# Patient Record
Sex: MALE | Race: BLACK OR AFRICAN AMERICAN | NOT HISPANIC OR LATINO | Employment: UNEMPLOYED | URBAN - METROPOLITAN AREA
[De-identification: names, ages, dates, MRNs, and addresses within clinical notes are randomized per-mention and may not be internally consistent; named-entity substitution may affect disease eponyms.]

---

## 2021-03-07 ENCOUNTER — ANESTHESIA (OUTPATIENT)
Dept: PERIOP | Facility: HOSPITAL | Age: 61
End: 2021-03-07

## 2021-03-07 ENCOUNTER — APPOINTMENT (OUTPATIENT)
Dept: GENERAL RADIOLOGY | Facility: HOSPITAL | Age: 61
End: 2021-03-07

## 2021-03-07 ENCOUNTER — HOSPITAL ENCOUNTER (INPATIENT)
Facility: HOSPITAL | Age: 61
LOS: 12 days | Discharge: HOME | End: 2021-03-19
Attending: EMERGENCY MEDICINE

## 2021-03-07 ENCOUNTER — APPOINTMENT (OUTPATIENT)
Dept: CT IMAGING | Facility: HOSPITAL | Age: 61
End: 2021-03-07

## 2021-03-07 ENCOUNTER — ANESTHESIA EVENT (OUTPATIENT)
Dept: PERIOP | Facility: HOSPITAL | Age: 61
End: 2021-03-07

## 2021-03-07 DIAGNOSIS — I63.9 CEREBROVASCULAR ACCIDENT (CVA), UNSPECIFIED MECHANISM (HCC): Primary | ICD-10-CM

## 2021-03-07 LAB
ABO GROUP BLD: NORMAL
ALBUMIN SERPL-MCNC: 3.8 G/DL (ref 3.5–5.2)
ALBUMIN/GLOB SERPL: 1.5 G/DL
ALP SERPL-CCNC: 47 U/L (ref 39–117)
ALT SERPL W P-5'-P-CCNC: 9 U/L (ref 1–41)
AMPHET+METHAMPHET UR QL: NEGATIVE
ANION GAP SERPL CALCULATED.3IONS-SCNC: 8.1 MMOL/L (ref 5–15)
APTT PPP: 33.7 SECONDS (ref 22.7–35.4)
ARTERIAL PATENCY WRIST A: ABNORMAL
AST SERPL-CCNC: 12 U/L (ref 1–40)
ATMOSPHERIC PRESS: 759.2 MMHG
BARBITURATES UR QL SCN: NEGATIVE
BASE EXCESS BLDA CALC-SCNC: -3.1 MMOL/L (ref 0–2)
BASOPHILS # BLD AUTO: 0.03 10*3/MM3 (ref 0–0.2)
BASOPHILS NFR BLD AUTO: 0.5 % (ref 0–1.5)
BDY SITE: ABNORMAL
BENZODIAZ UR QL SCN: NEGATIVE
BILIRUB SERPL-MCNC: 0.3 MG/DL (ref 0–1.2)
BLD GP AB SCN SERPL QL: NEGATIVE
BUN SERPL-MCNC: 12 MG/DL (ref 6–20)
BUN/CREAT SERPL: 12.1 (ref 7–25)
CALCIUM SPEC-SCNC: 8.2 MG/DL (ref 8.6–10.5)
CANNABINOIDS SERPL QL: POSITIVE
CHLORIDE SERPL-SCNC: 107 MMOL/L (ref 98–107)
CO2 SERPL-SCNC: 25.9 MMOL/L (ref 22–29)
COCAINE UR QL: NEGATIVE
CREAT SERPL-MCNC: 0.99 MG/DL (ref 0.76–1.27)
DEPRECATED RDW RBC AUTO: 40.8 FL (ref 37–54)
EOSINOPHIL # BLD AUTO: 0.12 10*3/MM3 (ref 0–0.4)
EOSINOPHIL NFR BLD AUTO: 2 % (ref 0.3–6.2)
ERYTHROCYTE [DISTWIDTH] IN BLOOD BY AUTOMATED COUNT: 14 % (ref 12.3–15.4)
GFR SERPL CREATININE-BSD FRML MDRD: 94 ML/MIN/1.73
GLOBULIN UR ELPH-MCNC: 2.5 GM/DL
GLUCOSE BLDC GLUCOMTR-MCNC: 97 MG/DL (ref 70–130)
GLUCOSE SERPL-MCNC: 107 MG/DL (ref 65–99)
HCO3 BLDA-SCNC: 23.4 MMOL/L (ref 22–28)
HCT VFR BLD AUTO: 35.3 % (ref 37.5–51)
HGB BLD-MCNC: 11.6 G/DL (ref 13–17.7)
HOLD SPECIMEN: NORMAL
HOLD SPECIMEN: NORMAL
IMM GRANULOCYTES # BLD AUTO: 0.02 10*3/MM3 (ref 0–0.05)
IMM GRANULOCYTES NFR BLD AUTO: 0.3 % (ref 0–0.5)
INHALED O2 CONCENTRATION: 50 %
INR PPP: 1.03 (ref 0.9–1.1)
LYMPHOCYTES # BLD AUTO: 1.91 10*3/MM3 (ref 0.7–3.1)
LYMPHOCYTES NFR BLD AUTO: 32.2 % (ref 19.6–45.3)
MCH RBC QN AUTO: 26.5 PG (ref 26.6–33)
MCHC RBC AUTO-ENTMCNC: 32.9 G/DL (ref 31.5–35.7)
MCV RBC AUTO: 80.6 FL (ref 79–97)
METHADONE UR QL SCN: NEGATIVE
MODALITY: ABNORMAL
MONOCYTES # BLD AUTO: 0.68 10*3/MM3 (ref 0.1–0.9)
MONOCYTES NFR BLD AUTO: 11.4 % (ref 5–12)
NEUTROPHILS NFR BLD AUTO: 3.18 10*3/MM3 (ref 1.7–7)
NEUTROPHILS NFR BLD AUTO: 53.6 % (ref 42.7–76)
NRBC BLD AUTO-RTO: 0 /100 WBC (ref 0–0.2)
O2 A-A PPRESDIFF RESPIRATORY: 0.2 MMHG
OPIATES UR QL: NEGATIVE
OXYCODONE UR QL SCN: NEGATIVE
PCO2 BLDA: 46.5 MM HG (ref 35–45)
PEEP RESPIRATORY: 7.5 CM[H2O]
PH BLDA: 7.31 PH UNITS (ref 7.35–7.45)
PLATELET # BLD AUTO: 190 10*3/MM3 (ref 140–450)
PMV BLD AUTO: 11.4 FL (ref 6–12)
PO2 BLDA: 56.2 MM HG (ref 80–100)
POTASSIUM SERPL-SCNC: 4 MMOL/L (ref 3.5–5.2)
PROT SERPL-MCNC: 6.3 G/DL (ref 6–8.5)
PROTHROMBIN TIME: 13.3 SECONDS (ref 11.7–14.2)
QT INTERVAL: 397 MS
RBC # BLD AUTO: 4.38 10*6/MM3 (ref 4.14–5.8)
RH BLD: POSITIVE
SAO2 % BLDCOA: 85.8 % (ref 92–99)
SARS-COV-2 RNA RESP QL NAA+PROBE: NOT DETECTED
SET MECH RESP RATE: 18
SODIUM SERPL-SCNC: 141 MMOL/L (ref 136–145)
T&S EXPIRATION DATE: NORMAL
TOTAL RATE: 27 BREATHS/MINUTE
TROPONIN T SERPL-MCNC: <0.01 NG/ML (ref 0–0.03)
VENTILATOR MODE: AC
VT ON VENT VENT: 500 ML
WBC # BLD AUTO: 5.94 10*3/MM3 (ref 3.4–10.8)
WHOLE BLOOD HOLD SPECIMEN: NORMAL
WHOLE BLOOD HOLD SPECIMEN: NORMAL

## 2021-03-07 PROCEDURE — C1725 CATH, TRANSLUMIN NON-LASER: HCPCS | Performed by: NEUROLOGICAL SURGERY

## 2021-03-07 PROCEDURE — 86850 RBC ANTIBODY SCREEN: CPT | Performed by: EMERGENCY MEDICINE

## 2021-03-07 PROCEDURE — 0042T HC CT CEREBRAL PERFUSION W/WO CONTRAST: CPT

## 2021-03-07 PROCEDURE — C1894 INTRO/SHEATH, NON-LASER: HCPCS | Performed by: NEUROLOGICAL SURGERY

## 2021-03-07 PROCEDURE — 70498 CT ANGIOGRAPHY NECK: CPT

## 2021-03-07 PROCEDURE — 25010000002 HEPARIN (PORCINE) PER 1000 UNITS: Performed by: NEUROLOGICAL SURGERY

## 2021-03-07 PROCEDURE — 85610 PROTHROMBIN TIME: CPT | Performed by: EMERGENCY MEDICINE

## 2021-03-07 PROCEDURE — 80053 COMPREHEN METABOLIC PANEL: CPT | Performed by: EMERGENCY MEDICINE

## 2021-03-07 PROCEDURE — 82962 GLUCOSE BLOOD TEST: CPT

## 2021-03-07 PROCEDURE — C1769 GUIDE WIRE: HCPCS | Performed by: NEUROLOGICAL SURGERY

## 2021-03-07 PROCEDURE — 25010000002 PROPOFOL 10 MG/ML EMULSION: Performed by: NURSE ANESTHETIST, CERTIFIED REGISTERED

## 2021-03-07 PROCEDURE — 25010000002 ONDANSETRON PER 1 MG: Performed by: NURSE ANESTHETIST, CERTIFIED REGISTERED

## 2021-03-07 PROCEDURE — 0 IOPAMIDOL PER 1 ML: Performed by: EMERGENCY MEDICINE

## 2021-03-07 PROCEDURE — 80307 DRUG TEST PRSMV CHEM ANLYZR: CPT | Performed by: INTERNAL MEDICINE

## 2021-03-07 PROCEDURE — B31B1ZZ FLUOROSCOPY OF LEFT EXTERNAL CAROTID ARTERY USING LOW OSMOLAR CONTRAST: ICD-10-PCS | Performed by: NEUROLOGICAL SURGERY

## 2021-03-07 PROCEDURE — 31500 INSERT EMERGENCY AIRWAY: CPT

## 2021-03-07 PROCEDURE — C1760 CLOSURE DEV, VASC: HCPCS | Performed by: NEUROLOGICAL SURGERY

## 2021-03-07 PROCEDURE — 94799 UNLISTED PULMONARY SVC/PX: CPT

## 2021-03-07 PROCEDURE — 86901 BLOOD TYPING SEROLOGIC RH(D): CPT | Performed by: EMERGENCY MEDICINE

## 2021-03-07 PROCEDURE — 25010000002 FENTANYL CITRATE (PF) 100 MCG/2ML SOLUTION: Performed by: NEUROLOGICAL SURGERY

## 2021-03-07 PROCEDURE — B3171ZZ FLUOROSCOPY OF LEFT INTERNAL CAROTID ARTERY USING LOW OSMOLAR CONTRAST: ICD-10-PCS | Performed by: NEUROLOGICAL SURGERY

## 2021-03-07 PROCEDURE — B31R1ZZ FLUOROSCOPY OF INTRACRANIAL ARTERIES USING LOW OSMOLAR CONTRAST: ICD-10-PCS | Performed by: NEUROLOGICAL SURGERY

## 2021-03-07 PROCEDURE — 85025 COMPLETE CBC W/AUTO DIFF WBC: CPT | Performed by: EMERGENCY MEDICINE

## 2021-03-07 PROCEDURE — 25010000002 ALTEPLASE PER 1 MG

## 2021-03-07 PROCEDURE — 82565 ASSAY OF CREATININE: CPT

## 2021-03-07 PROCEDURE — 0BH18EZ INSERTION OF ENDOTRACHEAL AIRWAY INTO TRACHEA, VIA NATURAL OR ARTIFICIAL OPENING ENDOSCOPIC: ICD-10-PCS | Performed by: EMERGENCY MEDICINE

## 2021-03-07 PROCEDURE — 70450 CT HEAD/BRAIN W/O DYE: CPT

## 2021-03-07 PROCEDURE — 93010 ELECTROCARDIOGRAM REPORT: CPT | Performed by: INTERNAL MEDICINE

## 2021-03-07 PROCEDURE — 74018 RADEX ABDOMEN 1 VIEW: CPT

## 2021-03-07 PROCEDURE — C1876 STENT, NON-COA/NON-COV W/DEL: HCPCS | Performed by: NEUROLOGICAL SURGERY

## 2021-03-07 PROCEDURE — 84484 ASSAY OF TROPONIN QUANT: CPT | Performed by: EMERGENCY MEDICINE

## 2021-03-07 PROCEDURE — 86900 BLOOD TYPING SEROLOGIC ABO: CPT | Performed by: EMERGENCY MEDICINE

## 2021-03-07 PROCEDURE — 03CL3ZZ EXTIRPATION OF MATTER FROM LEFT INTERNAL CAROTID ARTERY, PERCUTANEOUS APPROACH: ICD-10-PCS | Performed by: NEUROLOGICAL SURGERY

## 2021-03-07 PROCEDURE — 87040 BLOOD CULTURE FOR BACTERIA: CPT | Performed by: NEUROLOGICAL SURGERY

## 2021-03-07 PROCEDURE — 94002 VENT MGMT INPAT INIT DAY: CPT

## 2021-03-07 PROCEDURE — 37215 TRANSCATH STENT CCA W/EPS: CPT | Performed by: NEUROLOGICAL SURGERY

## 2021-03-07 PROCEDURE — 85730 THROMBOPLASTIN TIME PARTIAL: CPT | Performed by: EMERGENCY MEDICINE

## 2021-03-07 PROCEDURE — 99222 1ST HOSP IP/OBS MODERATE 55: CPT | Performed by: PSYCHIATRY & NEUROLOGY

## 2021-03-07 PROCEDURE — C1887 CATHETER, GUIDING: HCPCS | Performed by: NEUROLOGICAL SURGERY

## 2021-03-07 PROCEDURE — 25010000002 LORAZEPAM PER 2 MG: Performed by: EMERGENCY MEDICINE

## 2021-03-07 PROCEDURE — 0 IODIXANOL PER 1 ML: Performed by: INTERNAL MEDICINE

## 2021-03-07 PROCEDURE — 99291 CRITICAL CARE FIRST HOUR: CPT

## 2021-03-07 PROCEDURE — 25010000002 PIPERACILLIN SOD-TAZOBACTAM PER 1 G: Performed by: NEUROLOGICAL SURGERY

## 2021-03-07 PROCEDURE — 037L3DZ DILATION OF LEFT INTERNAL CAROTID ARTERY WITH INTRALUMINAL DEVICE, PERCUTANEOUS APPROACH: ICD-10-PCS | Performed by: NEUROLOGICAL SURGERY

## 2021-03-07 PROCEDURE — 71045 X-RAY EXAM CHEST 1 VIEW: CPT

## 2021-03-07 PROCEDURE — 25010000003 CEFAZOLIN IN DEXTROSE 2-4 GM/100ML-% SOLUTION: Performed by: NURSE ANESTHETIST, CERTIFIED REGISTERED

## 2021-03-07 PROCEDURE — 25010000002 PROPOFOL 10 MG/ML EMULSION: Performed by: EMERGENCY MEDICINE

## 2021-03-07 PROCEDURE — 71250 CT THORAX DX C-: CPT

## 2021-03-07 PROCEDURE — 25010000002 FENTANYL CITRATE (PF) 100 MCG/2ML SOLUTION: Performed by: EMERGENCY MEDICINE

## 2021-03-07 PROCEDURE — 25010000002 PROPOFOL 10 MG/ML EMULSION: Performed by: NEUROLOGICAL SURGERY

## 2021-03-07 PROCEDURE — 70496 CT ANGIOGRAPHY HEAD: CPT

## 2021-03-07 PROCEDURE — 5A1955Z RESPIRATORY VENTILATION, GREATER THAN 96 CONSECUTIVE HOURS: ICD-10-PCS | Performed by: INTERNAL MEDICINE

## 2021-03-07 PROCEDURE — 93005 ELECTROCARDIOGRAM TRACING: CPT | Performed by: EMERGENCY MEDICINE

## 2021-03-07 PROCEDURE — 25010000002 FENTANYL CITRATE (PF) 100 MCG/2ML SOLUTION: Performed by: NURSE ANESTHETIST, CERTIFIED REGISTERED

## 2021-03-07 PROCEDURE — 25010000002 ALTEPLASE PER 1 MG: Performed by: EMERGENCY MEDICINE

## 2021-03-07 PROCEDURE — 3E05317 INTRODUCTION OF OTHER THROMBOLYTIC INTO PERIPHERAL ARTERY, PERCUTANEOUS APPROACH: ICD-10-PCS | Performed by: EMERGENCY MEDICINE

## 2021-03-07 PROCEDURE — 82803 BLOOD GASES ANY COMBINATION: CPT

## 2021-03-07 PROCEDURE — 25010000002 DEXAMETHASONE PER 1 MG: Performed by: NURSE ANESTHETIST, CERTIFIED REGISTERED

## 2021-03-07 PROCEDURE — U0003 INFECTIOUS AGENT DETECTION BY NUCLEIC ACID (DNA OR RNA); SEVERE ACUTE RESPIRATORY SYNDROME CORONAVIRUS 2 (SARS-COV-2) (CORONAVIRUS DISEASE [COVID-19]), AMPLIFIED PROBE TECHNIQUE, MAKING USE OF HIGH THROUGHPUT TECHNOLOGIES AS DESCRIBED BY CMS-2020-01-R: HCPCS | Performed by: EMERGENCY MEDICINE

## 2021-03-07 PROCEDURE — 25010000002 HYDRALAZINE PER 20 MG: Performed by: PSYCHIATRY & NEUROLOGY

## 2021-03-07 PROCEDURE — C1884 EMBOLIZATION PROTECT SYST: HCPCS | Performed by: NEUROLOGICAL SURGERY

## 2021-03-07 PROCEDURE — 25010000002 HEPARIN (PORCINE) PER 1000 UNITS: Performed by: NURSE ANESTHETIST, CERTIFIED REGISTERED

## 2021-03-07 DEVICE — CLOSED CELL SELF-EXPANDING STENT
Type: IMPLANTABLE DEVICE | Site: CAROTID | Status: FUNCTIONAL
Brand: CAROTID WALLSTENT®

## 2021-03-07 RX ORDER — SODIUM CHLORIDE 9 MG/ML
INJECTION, SOLUTION INTRAVENOUS CONTINUOUS PRN
Status: DISCONTINUED | OUTPATIENT
Start: 2021-03-07 | End: 2021-03-07 | Stop reason: SURG

## 2021-03-07 RX ORDER — HEPARIN SODIUM 1000 [USP'U]/ML
INJECTION, SOLUTION INTRAVENOUS; SUBCUTANEOUS AS NEEDED
Status: DISCONTINUED | OUTPATIENT
Start: 2021-03-07 | End: 2021-03-07 | Stop reason: SURG

## 2021-03-07 RX ORDER — HYDRALAZINE HYDROCHLORIDE 20 MG/ML
20 INJECTION INTRAMUSCULAR; INTRAVENOUS EVERY 4 HOURS PRN
Status: DISCONTINUED | OUTPATIENT
Start: 2021-03-07 | End: 2021-03-19 | Stop reason: HOSPADM

## 2021-03-07 RX ORDER — METOPROLOL TARTRATE 5 MG/5ML
INJECTION INTRAVENOUS
Status: COMPLETED
Start: 2021-03-07 | End: 2021-03-07

## 2021-03-07 RX ORDER — NALOXONE HCL 0.4 MG/ML
0.2 VIAL (ML) INJECTION AS NEEDED
Status: DISCONTINUED | OUTPATIENT
Start: 2021-03-07 | End: 2021-03-07

## 2021-03-07 RX ORDER — ROCURONIUM BROMIDE 10 MG/ML
INJECTION, SOLUTION INTRAVENOUS AS NEEDED
Status: DISCONTINUED | OUTPATIENT
Start: 2021-03-07 | End: 2021-03-07 | Stop reason: SURG

## 2021-03-07 RX ORDER — ROCURONIUM BROMIDE 10 MG/ML
1 INJECTION, SOLUTION INTRAVENOUS ONCE
Status: COMPLETED | OUTPATIENT
Start: 2021-03-07 | End: 2021-03-07

## 2021-03-07 RX ORDER — FENTANYL CITRATE-0.9 % NACL/PF 5 MCG/ML
75 PLASTIC BAG, INJECTION (ML) INTRAVENOUS CONTINUOUS
Status: DISCONTINUED | OUTPATIENT
Start: 2021-03-07 | End: 2021-03-12

## 2021-03-07 RX ORDER — LABETALOL HYDROCHLORIDE 5 MG/ML
INJECTION, SOLUTION INTRAVENOUS AS NEEDED
Status: DISCONTINUED | OUTPATIENT
Start: 2021-03-07 | End: 2021-03-07 | Stop reason: SURG

## 2021-03-07 RX ORDER — LIDOCAINE HYDROCHLORIDE 10 MG/ML
0.5 INJECTION, SOLUTION EPIDURAL; INFILTRATION; INTRACAUDAL; PERINEURAL ONCE AS NEEDED
Status: DISCONTINUED | OUTPATIENT
Start: 2021-03-07 | End: 2021-03-07

## 2021-03-07 RX ORDER — ENALAPRILAT 2.5 MG/2ML
0.62 INJECTION INTRAVENOUS EVERY 6 HOURS PRN
Status: DISCONTINUED | OUTPATIENT
Start: 2021-03-07 | End: 2021-03-13

## 2021-03-07 RX ORDER — LABETALOL HYDROCHLORIDE 5 MG/ML
5 INJECTION, SOLUTION INTRAVENOUS
Status: DISCONTINUED | OUTPATIENT
Start: 2021-03-07 | End: 2021-03-07

## 2021-03-07 RX ORDER — DEXAMETHASONE SODIUM PHOSPHATE 10 MG/ML
INJECTION INTRAMUSCULAR; INTRAVENOUS AS NEEDED
Status: DISCONTINUED | OUTPATIENT
Start: 2021-03-07 | End: 2021-03-07 | Stop reason: SURG

## 2021-03-07 RX ORDER — FAMOTIDINE 10 MG/ML
20 INJECTION, SOLUTION INTRAVENOUS ONCE
Status: COMPLETED | OUTPATIENT
Start: 2021-03-07 | End: 2021-03-07

## 2021-03-07 RX ORDER — ETOMIDATE 2 MG/ML
0.3 INJECTION INTRAVENOUS ONCE
Status: COMPLETED | OUTPATIENT
Start: 2021-03-07 | End: 2021-03-07

## 2021-03-07 RX ORDER — EPHEDRINE SULFATE 50 MG/ML
5 INJECTION, SOLUTION INTRAVENOUS ONCE AS NEEDED
Status: DISCONTINUED | OUTPATIENT
Start: 2021-03-07 | End: 2021-03-07

## 2021-03-07 RX ORDER — MIDAZOLAM HYDROCHLORIDE 1 MG/ML
1 INJECTION INTRAMUSCULAR; INTRAVENOUS
Status: DISCONTINUED | OUTPATIENT
Start: 2021-03-07 | End: 2021-03-07

## 2021-03-07 RX ORDER — ATORVASTATIN CALCIUM 80 MG/1
80 TABLET, FILM COATED ORAL NIGHTLY
Status: DISCONTINUED | OUTPATIENT
Start: 2021-03-07 | End: 2021-03-11

## 2021-03-07 RX ORDER — LABETALOL HYDROCHLORIDE 5 MG/ML
20 INJECTION, SOLUTION INTRAVENOUS
Status: DISCONTINUED | OUTPATIENT
Start: 2021-03-07 | End: 2021-03-19 | Stop reason: HOSPADM

## 2021-03-07 RX ORDER — PROMETHAZINE HYDROCHLORIDE 25 MG/1
25 SUPPOSITORY RECTAL ONCE AS NEEDED
Status: DISCONTINUED | OUTPATIENT
Start: 2021-03-07 | End: 2021-03-07

## 2021-03-07 RX ORDER — LORAZEPAM 2 MG/ML
2 INJECTION INTRAMUSCULAR ONCE
Status: COMPLETED | OUTPATIENT
Start: 2021-03-07 | End: 2021-03-07

## 2021-03-07 RX ORDER — SODIUM CHLORIDE, SODIUM LACTATE, POTASSIUM CHLORIDE, CALCIUM CHLORIDE 600; 310; 30; 20 MG/100ML; MG/100ML; MG/100ML; MG/100ML
9 INJECTION, SOLUTION INTRAVENOUS CONTINUOUS
Status: DISCONTINUED | OUTPATIENT
Start: 2021-03-07 | End: 2021-03-07

## 2021-03-07 RX ORDER — HYDROMORPHONE HYDROCHLORIDE 1 MG/ML
0.5 INJECTION, SOLUTION INTRAMUSCULAR; INTRAVENOUS; SUBCUTANEOUS
Status: DISCONTINUED | OUTPATIENT
Start: 2021-03-07 | End: 2021-03-07

## 2021-03-07 RX ORDER — FENTANYL CITRATE 50 UG/ML
50 INJECTION, SOLUTION INTRAMUSCULAR; INTRAVENOUS
Status: DISCONTINUED | OUTPATIENT
Start: 2021-03-07 | End: 2021-03-07

## 2021-03-07 RX ORDER — DIPHENHYDRAMINE HCL 25 MG
25 CAPSULE ORAL
Status: DISCONTINUED | OUTPATIENT
Start: 2021-03-07 | End: 2021-03-07

## 2021-03-07 RX ORDER — FENTANYL CITRATE 50 UG/ML
INJECTION, SOLUTION INTRAMUSCULAR; INTRAVENOUS AS NEEDED
Status: DISCONTINUED | OUTPATIENT
Start: 2021-03-07 | End: 2021-03-07 | Stop reason: SURG

## 2021-03-07 RX ORDER — ASPIRIN 325 MG
325 TABLET ORAL DAILY
Status: DISCONTINUED | OUTPATIENT
Start: 2021-03-07 | End: 2021-03-08

## 2021-03-07 RX ORDER — OXYCODONE AND ACETAMINOPHEN 7.5; 325 MG/1; MG/1
1 TABLET ORAL ONCE AS NEEDED
Status: DISCONTINUED | OUTPATIENT
Start: 2021-03-07 | End: 2021-03-07

## 2021-03-07 RX ORDER — SODIUM CHLORIDE 9 MG/ML
100 INJECTION, SOLUTION INTRAVENOUS ONCE
Status: DISCONTINUED | OUTPATIENT
Start: 2021-03-07 | End: 2021-03-07

## 2021-03-07 RX ORDER — FLUMAZENIL 0.1 MG/ML
0.2 INJECTION INTRAVENOUS AS NEEDED
Status: DISCONTINUED | OUTPATIENT
Start: 2021-03-07 | End: 2021-03-07

## 2021-03-07 RX ORDER — SODIUM CHLORIDE 0.9 % (FLUSH) 0.9 %
10 SYRINGE (ML) INJECTION AS NEEDED
Status: DISCONTINUED | OUTPATIENT
Start: 2021-03-07 | End: 2021-03-19 | Stop reason: HOSPADM

## 2021-03-07 RX ORDER — ONDANSETRON 2 MG/ML
INJECTION INTRAMUSCULAR; INTRAVENOUS AS NEEDED
Status: DISCONTINUED | OUTPATIENT
Start: 2021-03-07 | End: 2021-03-07 | Stop reason: SURG

## 2021-03-07 RX ORDER — CEFAZOLIN SODIUM 2 G/100ML
INJECTION, SOLUTION INTRAVENOUS AS NEEDED
Status: DISCONTINUED | OUTPATIENT
Start: 2021-03-07 | End: 2021-03-07 | Stop reason: SURG

## 2021-03-07 RX ORDER — GLYCOPYRROLATE 0.2 MG/ML
INJECTION INTRAMUSCULAR; INTRAVENOUS AS NEEDED
Status: DISCONTINUED | OUTPATIENT
Start: 2021-03-07 | End: 2021-03-07 | Stop reason: SURG

## 2021-03-07 RX ORDER — ASPIRIN 600 MG/1
600 SUPPOSITORY RECTAL ONCE
Status: COMPLETED | OUTPATIENT
Start: 2021-03-07 | End: 2021-03-07

## 2021-03-07 RX ORDER — LABETALOL HYDROCHLORIDE 5 MG/ML
INJECTION, SOLUTION INTRAVENOUS
Status: COMPLETED
Start: 2021-03-07 | End: 2021-03-07

## 2021-03-07 RX ORDER — ONDANSETRON 2 MG/ML
4 INJECTION INTRAMUSCULAR; INTRAVENOUS ONCE AS NEEDED
Status: DISCONTINUED | OUTPATIENT
Start: 2021-03-07 | End: 2021-03-07

## 2021-03-07 RX ORDER — IODIXANOL 320 MG/ML
200 INJECTION, SOLUTION INTRAVASCULAR
Status: COMPLETED | OUTPATIENT
Start: 2021-03-07 | End: 2021-03-07

## 2021-03-07 RX ORDER — SODIUM CHLORIDE 0.9 % (FLUSH) 0.9 %
3-10 SYRINGE (ML) INJECTION AS NEEDED
Status: DISCONTINUED | OUTPATIENT
Start: 2021-03-07 | End: 2021-03-07

## 2021-03-07 RX ORDER — CLOPIDOGREL BISULFATE 75 MG/1
600 TABLET ORAL ONCE
Status: COMPLETED | OUTPATIENT
Start: 2021-03-07 | End: 2021-03-07

## 2021-03-07 RX ORDER — PROPOFOL 10 MG/ML
VIAL (ML) INTRAVENOUS AS NEEDED
Status: DISCONTINUED | OUTPATIENT
Start: 2021-03-07 | End: 2021-03-07 | Stop reason: SURG

## 2021-03-07 RX ORDER — CLOPIDOGREL BISULFATE 75 MG/1
75 TABLET ORAL DAILY
Status: DISCONTINUED | OUTPATIENT
Start: 2021-03-08 | End: 2021-03-08

## 2021-03-07 RX ORDER — SODIUM CHLORIDE 0.9 % (FLUSH) 0.9 %
3 SYRINGE (ML) INJECTION EVERY 12 HOURS SCHEDULED
Status: DISCONTINUED | OUTPATIENT
Start: 2021-03-07 | End: 2021-03-07

## 2021-03-07 RX ORDER — HYDROCODONE BITARTRATE AND ACETAMINOPHEN 7.5; 325 MG/1; MG/1
1 TABLET ORAL ONCE AS NEEDED
Status: DISCONTINUED | OUTPATIENT
Start: 2021-03-07 | End: 2021-03-07

## 2021-03-07 RX ORDER — PROMETHAZINE HYDROCHLORIDE 25 MG/1
25 TABLET ORAL ONCE AS NEEDED
Status: DISCONTINUED | OUTPATIENT
Start: 2021-03-07 | End: 2021-03-07

## 2021-03-07 RX ORDER — FENTANYL CITRATE 50 UG/ML
100 INJECTION, SOLUTION INTRAMUSCULAR; INTRAVENOUS
Status: DISCONTINUED | OUTPATIENT
Start: 2021-03-07 | End: 2021-03-09

## 2021-03-07 RX ORDER — SODIUM CHLORIDE, SODIUM LACTATE, POTASSIUM CHLORIDE, CALCIUM CHLORIDE 600; 310; 30; 20 MG/100ML; MG/100ML; MG/100ML; MG/100ML
INJECTION, SOLUTION INTRAVENOUS CONTINUOUS PRN
Status: DISCONTINUED | OUTPATIENT
Start: 2021-03-07 | End: 2021-03-07 | Stop reason: SURG

## 2021-03-07 RX ORDER — DIPHENHYDRAMINE HYDROCHLORIDE 50 MG/ML
12.5 INJECTION INTRAMUSCULAR; INTRAVENOUS
Status: DISCONTINUED | OUTPATIENT
Start: 2021-03-07 | End: 2021-03-07

## 2021-03-07 RX ORDER — MIDAZOLAM HYDROCHLORIDE 1 MG/ML
2 INJECTION INTRAMUSCULAR; INTRAVENOUS
Status: DISCONTINUED | OUTPATIENT
Start: 2021-03-07 | End: 2021-03-07

## 2021-03-07 RX ADMIN — DEXAMETHASONE SODIUM PHOSPHATE 4 MG: 10 INJECTION INTRAMUSCULAR; INTRAVENOUS at 12:44

## 2021-03-07 RX ADMIN — HYDRALAZINE HYDROCHLORIDE 20 MG: 20 INJECTION, SOLUTION INTRAMUSCULAR; INTRAVENOUS at 19:15

## 2021-03-07 RX ADMIN — IODIXANOL 190 ML: 320 INJECTION, SOLUTION INTRAVASCULAR at 13:37

## 2021-03-07 RX ADMIN — ATORVASTATIN CALCIUM 80 MG: 80 TABLET, FILM COATED ORAL at 21:26

## 2021-03-07 RX ADMIN — FENTANYL CITRATE 100 MCG: 50 INJECTION INTRAMUSCULAR; INTRAVENOUS at 14:38

## 2021-03-07 RX ADMIN — FENTANYL CITRATE 100 MCG: 50 INJECTION INTRAMUSCULAR; INTRAVENOUS at 12:25

## 2021-03-07 RX ADMIN — FENTANYL CITRATE 100 MCG: 50 INJECTION, SOLUTION INTRAMUSCULAR; INTRAVENOUS at 17:12

## 2021-03-07 RX ADMIN — CEFAZOLIN SODIUM 3 G: 2 INJECTION, SOLUTION INTRAVENOUS at 13:49

## 2021-03-07 RX ADMIN — LABETALOL HYDROCHLORIDE 20 MG: 5 INJECTION, SOLUTION INTRAVENOUS at 19:22

## 2021-03-07 RX ADMIN — METOPROLOL TARTRATE 5 MG: 5 INJECTION, SOLUTION INTRAVENOUS at 21:37

## 2021-03-07 RX ADMIN — LABETALOL HYDROCHLORIDE 20 MG: 5 INJECTION, SOLUTION INTRAVENOUS at 16:23

## 2021-03-07 RX ADMIN — ALTEPLASE 81 MG: KIT at 11:22

## 2021-03-07 RX ADMIN — TAZOBACTAM SODIUM AND PIPERACILLIN SODIUM 3.38 G: 375; 3 INJECTION, SOLUTION INTRAVENOUS at 22:12

## 2021-03-07 RX ADMIN — PROPOFOL 20 MCG/KG/MIN: 10 INJECTION, EMULSION INTRAVENOUS at 15:53

## 2021-03-07 RX ADMIN — SODIUM CHLORIDE: 9 INJECTION, SOLUTION INTRAVENOUS at 12:07

## 2021-03-07 RX ADMIN — LORAZEPAM 2 MG: 2 INJECTION INTRAMUSCULAR; INTRAVENOUS at 09:25

## 2021-03-07 RX ADMIN — FENTANYL CITRATE 100 MCG: 50 INJECTION, SOLUTION INTRAMUSCULAR; INTRAVENOUS at 22:11

## 2021-03-07 RX ADMIN — ALTEPLASE 9 MG: KIT at 11:16

## 2021-03-07 RX ADMIN — PROPOFOL 20 MCG/KG/MIN: 10 INJECTION, EMULSION INTRAVENOUS at 11:53

## 2021-03-07 RX ADMIN — ROCURONIUM BROMIDE 120 MG: 50 INJECTION INTRAVENOUS at 11:38

## 2021-03-07 RX ADMIN — PROPOFOL 50 MG: 10 INJECTION, EMULSION INTRAVENOUS at 14:34

## 2021-03-07 RX ADMIN — GLYCOPYRROLATE 0.2 MG: 0.2 INJECTION INTRAMUSCULAR; INTRAVENOUS at 13:54

## 2021-03-07 RX ADMIN — SODIUM CHLORIDE 15 MG/HR: 9 INJECTION, SOLUTION INTRAVENOUS at 20:46

## 2021-03-07 RX ADMIN — SODIUM CHLORIDE, POTASSIUM CHLORIDE, SODIUM LACTATE AND CALCIUM CHLORIDE: 600; 310; 30; 20 INJECTION, SOLUTION INTRAVENOUS at 12:07

## 2021-03-07 RX ADMIN — FENTANYL CITRATE 100 MCG: 50 INJECTION, SOLUTION INTRAMUSCULAR; INTRAVENOUS at 23:08

## 2021-03-07 RX ADMIN — PROPOFOL 50 MCG/KG/MIN: 10 INJECTION, EMULSION INTRAVENOUS at 18:47

## 2021-03-07 RX ADMIN — ETOMIDATE 40 MG: 2 INJECTION, SOLUTION INTRAVENOUS at 11:38

## 2021-03-07 RX ADMIN — METOROPROLOL TARTRATE 5 MG: 5 INJECTION, SOLUTION INTRAVENOUS at 21:27

## 2021-03-07 RX ADMIN — NICARDIPINE HYDROCHLORIDE 5 MG/HR: 2.5 INJECTION, SOLUTION INTRAVENOUS at 14:26

## 2021-03-07 RX ADMIN — FENTANYL CITRATE 100 MCG: 50 INJECTION, SOLUTION INTRAMUSCULAR; INTRAVENOUS at 20:25

## 2021-03-07 RX ADMIN — ENALAPRILAT 0.62 MG: 2.5 INJECTION INTRAVENOUS at 20:17

## 2021-03-07 RX ADMIN — FENTANYL CITRATE 100 MCG: 50 INJECTION, SOLUTION INTRAMUSCULAR; INTRAVENOUS at 11:35

## 2021-03-07 RX ADMIN — HEPARIN SODIUM 5000 UNITS: 1000 INJECTION INTRAVENOUS; SUBCUTANEOUS at 13:32

## 2021-03-07 RX ADMIN — ONDANSETRON 4 MG: 2 INJECTION INTRAMUSCULAR; INTRAVENOUS at 14:15

## 2021-03-07 RX ADMIN — ASPIRIN 600 MG: 600 SUPPOSITORY RECTAL at 16:18

## 2021-03-07 RX ADMIN — SODIUM CHLORIDE 15 MG/HR: 9 INJECTION, SOLUTION INTRAVENOUS at 18:49

## 2021-03-07 RX ADMIN — SODIUM CHLORIDE 15 MG/HR: 9 INJECTION, SOLUTION INTRAVENOUS at 22:34

## 2021-03-07 RX ADMIN — FAMOTIDINE 20 MG: 10 INJECTION INTRAVENOUS at 18:40

## 2021-03-07 RX ADMIN — TAZOBACTAM SODIUM AND PIPERACILLIN SODIUM 3.38 G: 375; 3 INJECTION, SOLUTION INTRAVENOUS at 16:05

## 2021-03-07 RX ADMIN — HEPARIN SODIUM 1000 UNITS: 1000 INJECTION INTRAVENOUS; SUBCUTANEOUS at 14:53

## 2021-03-07 RX ADMIN — CLOPIDOGREL 600 MG: 75 TABLET, FILM COATED ORAL at 20:16

## 2021-03-07 RX ADMIN — ROCURONIUM BROMIDE 40 MG: 50 INJECTION INTRAVENOUS at 12:25

## 2021-03-07 RX ADMIN — PROPOFOL 50 MCG/KG/MIN: 10 INJECTION, EMULSION INTRAVENOUS at 21:25

## 2021-03-07 RX ADMIN — HYDRALAZINE HYDROCHLORIDE 20 MG: 20 INJECTION, SOLUTION INTRAMUSCULAR; INTRAVENOUS at 22:56

## 2021-03-07 RX ADMIN — IOPAMIDOL 150 ML: 755 INJECTION, SOLUTION INTRAVENOUS at 11:09

## 2021-03-07 RX ADMIN — LABETALOL HYDROCHLORIDE 20 MG: 5 INJECTION, SOLUTION INTRAVENOUS at 16:48

## 2021-03-07 RX ADMIN — LABETALOL HYDROCHLORIDE 10 MG: 5 INJECTION, SOLUTION INTRAVENOUS at 14:42

## 2021-03-07 RX ADMIN — HEPARIN SODIUM 5000 UNITS: 1000 INJECTION INTRAVENOUS; SUBCUTANEOUS at 14:16

## 2021-03-07 RX ADMIN — SODIUM CHLORIDE 15 MG/HR: 9 INJECTION, SOLUTION INTRAVENOUS at 17:06

## 2021-03-07 RX ADMIN — LABETALOL HYDROCHLORIDE 20 MG: 5 INJECTION, SOLUTION INTRAVENOUS at 20:46

## 2021-03-07 RX ADMIN — FENTANYL CITRATE 100 MCG: 50 INJECTION, SOLUTION INTRAMUSCULAR; INTRAVENOUS at 16:17

## 2021-03-07 NOTE — ANESTHESIA POSTPROCEDURE EVALUATION
"Patient: Jose R Dalton    Procedure Summary     Date: 03/07/21 [facility]    Anesthesia Start: 1206 Anesthesia Stop: 1527    Procedure: MECHANICAL EMBOLECTOMY, WITH LEFT CAROTID ARTERY ANGIOPLASTY AND STENT PLACEMENT (N/A ) Diagnosis:     Surgeons: Nick Washington MD Provider: Gurwinder Magaña MD    Anesthesia Type: general ASA Status: 4 - Emergent          Anesthesia Type: general    Vitals  Vitals Value Taken Time   /66 03/07/21 1529   Temp 36.4 °C (97.5 °F) 03/07/21 1529   Pulse 68 03/07/21 1534   Resp 20 03/07/21 1529   SpO2 100 % 03/07/21 1534   Vitals shown include unvalidated device data.        Post Anesthesia Care and Evaluation    Patient location during evaluation: ICU  Patient participation: complete - patient cannot participate  Level of consciousness: obtunded/minimal responses  Pain score: 0  Pain management: adequate  Airway patency: patent  Anesthetic complications: No anesthetic complications    Cardiovascular status: acceptable  Respiratory status: intubated and ventilator  Hydration status: acceptable    Comments: /66 (BP Location: Left arm)   Pulse 68   Temp 36.4 °C (97.5 °F) (Oral)   Resp 20   Ht 185.4 cm (73\")   Wt 127 kg (280 lb 3.2 oz)   SpO2 98%   BMI 36.97 kg/m²         "

## 2021-03-07 NOTE — PROGRESS NOTES
Per Dr. Sheehan, ER, patient too unstable to consider transfer-has [facility] benefits. Spoke w/ Magaly at [facility] re admission here and received authorization #-[id number]

## 2021-03-07 NOTE — ED PROVIDER NOTES
EMERGENCY DEPARTMENT ENCOUNTER    Room Number:  [facility] OR/MAIN OR  Date of encounter:  3/7/2021  PCP: Provider, No Known  Historian: EMS      HPI:  Chief Complaint: Stroke-like symptoms  A complete HPI/ROS/PMH/PSH/SH/FH are unobtainable due to: Nonverbal    Context: Jose R Dalton is a 60 y.o. male who presents to the ED c/o strokelike symptoms.  Patient reported a history of stroke fully functional at baseline.  Reportedly, his significant other noticed an acute change in his functional status at 9:25 AM, approximately 1 hour prior to arrival.  Symptoms are constant.  Glucose is 109.      PAST MEDICAL HISTORY  Active Ambulatory Problems     Diagnosis Date Noted   • No Active Ambulatory Problems     Resolved Ambulatory Problems     Diagnosis Date Noted   • No Resolved Ambulatory Problems     Past Medical History:   Diagnosis Date   • Elevated cholesterol    • Hypertension    • Obesity    • Seizure (CMS/HCC)    • Seizures (CMS/HCC)    • Stroke (CMS/HCC)          PAST SURGICAL HISTORY  Past Surgical History:   Procedure Laterality Date   • CAROTID ENDARTERECTOMY Right    • CAROTID ENDARTERECTOMY Right          FAMILY HISTORY  History reviewed. No pertinent family history.      SOCIAL HISTORY  Social History     Socioeconomic History   • Marital status: Single     Spouse name: Not on file   • Number of children: Not on file   • Years of education: Not on file   • Highest education level: Not on file   Tobacco Use   • Smoking status: Current Every Day Smoker     Packs/day: 0.50   • Smokeless tobacco: Never Used   Substance and Sexual Activity   • Alcohol use: No   • Drug use: Yes     Types: Cocaine(coke), Marijuana     Comment: positive drug screen in 2016, current possession of marijuana 3/7/21   • Sexual activity: Defer         ALLERGIES  Patient has no known allergies.        REVIEW OF SYSTEMS  Review of Systems     Unable to perform ROS due to patient's altered mental status      PHYSICAL EXAM    I have reviewed  the triage vital signs and nursing notes.    ED Triage Vitals [21 1037]   Temp Heart Rate Resp BP SpO2   -- 75 18 168/90 --      Temp src Heart Rate Source Patient Position BP Location FiO2 (%)   -- -- -- -- --       GENERAL: distressed  HENT: nares patent  EYES: no scleral icterus  CV: Regular rate and rhythm  RESPIRATORY: Clear to auscultation bilaterally  ABDOMEN: soft  MUSCULOSKELETAL: no deformity  NEURO:   NIH Stroke Scale      Interval: Baseline  Time: 15:23 EST  Person Administering Scale: Sd Sheehan II, MD    Administer stroke scale items in the order listed. Record performance in each category after each subscale exam. Do not go back and change scores. Follow directions provided for each exam technique. Scores should reflect what the patient does, not what the clinician thinks the patient can do. The clinician should record answers while administering the exam and work quickly. Except where indicated, the patient should not be coached (i.e., repeated requests to patient to make a special effort).      1a  Level of consciousness: 2=not alert, requires repeated stimulation to attend, or is obtunded and requires strong or painful stimulation to make movements (not stereotyped)   1b. LOC questions:  2=Performs neither task correctly   1c. LOC commands: 2=Performs neither task correctly   2.  Best Gaze: 1=partial gaze palsy   3.  Visual: 0=No visual loss   4. Facial Palsy: 1=Minor paralysis (flattened nasolabial fold, asymmetric on smiling)   5a.  Motor left arm: 1=Drift, limb holds 90 (or 45) degrees but drifts down before full 10 seconds: does not hit bed   5b.  Motor right arm: 4=No movement   6a. motor left le=Some effort against gravity, limb cannot get to or maintain (if cured) 90 (or 45) degrees, drifts down to bed, but has some effort against gravity   6b  Motor right le=No movement   7. Limb Ataxia: 0=Absent   8.  Sensory: 0=Normal; no sensory loss   9. Best Language:  3=Mute, global  aphasia; no usable speech or auditory comprehension   10. Dysarthria: 0=Normal   11. Extinction and Inattention: 1=Visual, tactile, auditory, spatial or personal inattention or extinction to bilateral simultaneous stimulation in one of the sensory modalities   12. Distal motor function: 0=Normal    Total:   24     SKIN: Warm, dry        LAB RESULTS  Recent Results (from the past 24 hour(s))   COVID-19,[facility] IN-HOUSE CEPHEID, NP SWAB IN TRANSPORT MEDIA 8-12 HR TAT - Swab, Nasopharynx    Collection Time: 03/07/21 10:51 AM    Specimen: Nasopharynx; Swab   Result Value Ref Range    COVID19 Not Detected Not Detected - Ref. Range   Protime-INR    Collection Time: 03/07/21 11:05 AM    Specimen: Blood   Result Value Ref Range    Protime 13.3 11.7 - 14.2 Seconds    INR 1.03 0.90 - 1.10   aPTT    Collection Time: 03/07/21 11:05 AM    Specimen: Blood   Result Value Ref Range    PTT 33.7 22.7 - 35.4 seconds   Light Blue Top    Collection Time: 03/07/21 11:05 AM   Result Value Ref Range    Extra Tube hold for add-on    Lavender Top    Collection Time: 03/07/21 11:05 AM   Result Value Ref Range    Extra Tube hold for add-on    CBC Auto Differential    Collection Time: 03/07/21 11:05 AM    Specimen: Blood   Result Value Ref Range    WBC 5.94 3.40 - 10.80 10*3/mm3    RBC 4.38 4.14 - 5.80 10*6/mm3    Hemoglobin 11.6 (L) 13.0 - 17.7 g/dL    Hematocrit 35.3 (L) 37.5 - 51.0 %    MCV 80.6 79.0 - 97.0 fL    MCH 26.5 (L) 26.6 - 33.0 pg    MCHC 32.9 31.5 - 35.7 g/dL    RDW 14.0 12.3 - 15.4 %    RDW-SD 40.8 37.0 - 54.0 fl    MPV 11.4 6.0 - 12.0 fL    Platelets 190 140 - 450 10*3/mm3    Neutrophil % 53.6 42.7 - 76.0 %    Lymphocyte % 32.2 19.6 - 45.3 %    Monocyte % 11.4 5.0 - 12.0 %    Eosinophil % 2.0 0.3 - 6.2 %    Basophil % 0.5 0.0 - 1.5 %    Immature Grans % 0.3 0.0 - 0.5 %    Neutrophils, Absolute 3.18 1.70 - 7.00 10*3/mm3    Lymphocytes, Absolute 1.91 0.70 - 3.10 10*3/mm3    Monocytes, Absolute 0.68 0.10 - 0.90 10*3/mm3     Eosinophils, Absolute 0.12 0.00 - 0.40 10*3/mm3    Basophils, Absolute 0.03 0.00 - 0.20 10*3/mm3    Immature Grans, Absolute 0.02 0.00 - 0.05 10*3/mm3    nRBC 0.0 0.0 - 0.2 /100 WBC   Comprehensive Metabolic Panel    Collection Time: 03/07/21 11:06 AM    Specimen: Blood   Result Value Ref Range    Glucose 107 (H) 65 - 99 mg/dL    BUN 12 6 - 20 mg/dL    Creatinine 0.99 0.76 - 1.27 mg/dL    Sodium 141 136 - 145 mmol/L    Potassium 4.0 3.5 - 5.2 mmol/L    Chloride 107 98 - 107 mmol/L    CO2 25.9 22.0 - 29.0 mmol/L    Calcium 8.2 (L) 8.6 - 10.5 mg/dL    Total Protein 6.3 6.0 - 8.5 g/dL    Albumin 3.80 3.50 - 5.20 g/dL    ALT (SGPT) 9 1 - 41 U/L    AST (SGOT) 12 1 - 40 U/L    Alkaline Phosphatase 47 39 - 117 U/L    Total Bilirubin 0.3 0.0 - 1.2 mg/dL    eGFR  African Amer 94 >60 mL/min/1.73    Globulin 2.5 gm/dL    A/G Ratio 1.5 g/dL    BUN/Creatinine Ratio 12.1 7.0 - 25.0    Anion Gap 8.1 5.0 - 15.0 mmol/L   Troponin    Collection Time: 03/07/21 11:06 AM    Specimen: Blood   Result Value Ref Range    Troponin T <0.010 0.000 - 0.030 ng/mL   Green Top (Gel)    Collection Time: 03/07/21 11:06 AM   Result Value Ref Range    Extra Tube Hold for add-ons.    Gold Top - SST    Collection Time: 03/07/21 11:06 AM   Result Value Ref Range    Extra Tube Hold for add-ons.    ECG 12 Lead    Collection Time: 03/07/21 11:16 AM   Result Value Ref Range    QT Interval 397 ms   Type & Screen    Collection Time: 03/07/21 11:20 AM    Specimen: Blood   Result Value Ref Range    ABO Type A     RH type Positive     Antibody Screen Negative     T&S Expiration Date 3/10/2021 11:59:59 PM        Ordered the above labs and independently reviewed the results.        RADIOLOGY  CT Angiogram Neck    Result Date: 3/7/2021  CT ANGIOGRAPHY OF THE HEAD AND NECK WITHOUT AND WITH INTRAVENOUS CONTRAST AND 3-D RECONSTRUCTIONS AND COLOR CT PERFUSION IMAGING OF THE BRAIN WITH INTRAVENOUS CONTRAST  HISTORY: Recent onset of severe right-sided weakness. Aphasia.  Team D evaluation.  TECHNIQUE: The CT scan was performed as an emergency procedure through the head and neck with CT angiography protocol without and with intravenous contrast and 3-D reconstructions followed by color CT perfusion imaging of the brain with contrast.  FINDINGS: The following findings are present: 1. An initial noncontrast CT scan of the brain was performed. There is a large area of old infarction in the right temporoparietal region similar to the study of 02/24/2016. There is no evidence of intracranial hemorrhage or mass effect. The findings of the noncontrast CT scan were communicated to the team D physician when imaging made available at 10:50 AM. The postcontrast findings were communicated when imaging made available at 11:06 AM. 2. Evaluation for stenosis is based on NASCET criteria. The vertebral arteries are patent with slight dominance of the right vertebral artery. The left vertebral artery is quite small in caliber and filling of the basilar artery is predominantly via the right vertebral artery. The right posterior communicating artery is patent. 3. The right cervical carotid artery shows no stenosis. The left cervical carotid artery shows complete occlusion just beyond the origin of the left internal carotid artery and I suspect this represents acute occlusion. There is some faint opacification of the internal carotid artery along the carotid canal at the base of skull. 4. The intracranial CT angiography shows some decreased opacification of the left carotid siphon compared to the right. The anterior communicating artery is patent and the right posterior communicating artery is patent and therefore much of the left-sided circulation is supplied from the right. No intracranial stenosis or thrombosis is identified. There is no evidence of aneurysm. 5. The color CT perfusion imaging shows no abnormal cerebral blood flow less than 30% by volume. However there is a large area of Tmax greater  than 6 seconds by volume located in the left MCA territory. This has a volume of 55 mL.      Radiation dose reduction techniques were utilized, including automated exposure control and exposure modulation based on body size.  This report was finalized on 3/7/2021 1:16 PM by Dr. Macho Clifford M.D.      XR Chest 1 View    Result Date: 3/7/2021  XR CHEST 1 VW-  3/7/2021  HISTORY: Acute stroke protocol.  The heart size is at the upper limits of normal. There are some bilateral interstitial disease more severe on the right than on the left with more confluent increased density in the right lung base. There is masslike area of increased soft tissue along the left paramediastinal region. This is not seen on the previous study of 1/26/2016 and could represent atelectasis, dense consolidation or a mass.  Endotracheal tube is seen in good position with its tip overlying the trachea at the level of the aortic arch.  No pneumothorax is seen.      Borderline cardiomegaly. 2. Interstitial disease mostly on the right with more confluent increased density in the right lung base. FINDINGS could represent asymmetric pulmonary edema and/or pneumonia. 3. Masslike opacity in the left paramediastinal region could be related to atelectasis, dense pneumonia and/or mass. 4. Endotracheal tube tip overlies the trachea. 5. Please correlate with the clinical findings. Short-term follow-up chest radiograph and/or follow-up CT of the chest recommended.  This report was finalized on 3/7/2021 12:02 PM by Dr. Jay Jay Savage M.D.      CT Angiogram Head    Result Date: 3/7/2021  CT ANGIOGRAPHY OF THE HEAD AND NECK WITHOUT AND WITH INTRAVENOUS CONTRAST AND 3-D RECONSTRUCTIONS AND COLOR CT PERFUSION IMAGING OF THE BRAIN WITH INTRAVENOUS CONTRAST  HISTORY: Recent onset of severe right-sided weakness. Aphasia. Team D evaluation.  TECHNIQUE: The CT scan was performed as an emergency procedure through the head and neck with CT angiography protocol  without and with intravenous contrast and 3-D reconstructions followed by color CT perfusion imaging of the brain with contrast.  FINDINGS: The following findings are present: 1. An initial noncontrast CT scan of the brain was performed. There is a large area of old infarction in the right temporoparietal region similar to the study of 02/24/2016. There is no evidence of intracranial hemorrhage or mass effect. The findings of the noncontrast CT scan were communicated to the team D physician when imaging made available at 10:50 AM. The postcontrast findings were communicated when imaging made available at 11:06 AM. 2. Evaluation for stenosis is based on NASCET criteria. The vertebral arteries are patent with slight dominance of the right vertebral artery. The left vertebral artery is quite small in caliber and filling of the basilar artery is predominantly via the right vertebral artery. The right posterior communicating artery is patent. 3. The right cervical carotid artery shows no stenosis. The left cervical carotid artery shows complete occlusion just beyond the origin of the left internal carotid artery and I suspect this represents acute occlusion. There is some faint opacification of the internal carotid artery along the carotid canal at the base of skull. 4. The intracranial CT angiography shows some decreased opacification of the left carotid siphon compared to the right. The anterior communicating artery is patent and the right posterior communicating artery is patent and therefore much of the left-sided circulation is supplied from the right. No intracranial stenosis or thrombosis is identified. There is no evidence of aneurysm. 5. The color CT perfusion imaging shows no abnormal cerebral blood flow less than 30% by volume. However there is a large area of Tmax greater than 6 seconds by volume located in the left MCA territory. This has a volume of 55 mL.      Radiation dose reduction techniques were  utilized, including automated exposure control and exposure modulation based on body size.  This report was finalized on 3/7/2021 1:16 PM by Dr. Macho Clifford M.D.      CT CEREBRAL PERFUSION W WO CONTRAST W AI ANALYSIS OF LVO    Result Date: 3/7/2021  CT ANGIOGRAPHY OF THE HEAD AND NECK WITHOUT AND WITH INTRAVENOUS CONTRAST AND 3-D RECONSTRUCTIONS AND COLOR CT PERFUSION IMAGING OF THE BRAIN WITH INTRAVENOUS CONTRAST  HISTORY: Recent onset of severe right-sided weakness. Aphasia. Team D evaluation.  TECHNIQUE: The CT scan was performed as an emergency procedure through the head and neck with CT angiography protocol without and with intravenous contrast and 3-D reconstructions followed by color CT perfusion imaging of the brain with contrast.  FINDINGS: The following findings are present: 1. An initial noncontrast CT scan of the brain was performed. There is a large area of old infarction in the right temporoparietal region similar to the study of 02/24/2016. There is no evidence of intracranial hemorrhage or mass effect. The findings of the noncontrast CT scan were communicated to the team D physician when imaging made available at 10:50 AM. The postcontrast findings were communicated when imaging made available at 11:06 AM. 2. Evaluation for stenosis is based on NASCET criteria. The vertebral arteries are patent with slight dominance of the right vertebral artery. The left vertebral artery is quite small in caliber and filling of the basilar artery is predominantly via the right vertebral artery. The right posterior communicating artery is patent. 3. The right cervical carotid artery shows no stenosis. The left cervical carotid artery shows complete occlusion just beyond the origin of the left internal carotid artery and I suspect this represents acute occlusion. There is some faint opacification of the internal carotid artery along the carotid canal at the base of skull. 4. The intracranial CT angiography shows  some decreased opacification of the left carotid siphon compared to the right. The anterior communicating artery is patent and the right posterior communicating artery is patent and therefore much of the left-sided circulation is supplied from the right. No intracranial stenosis or thrombosis is identified. There is no evidence of aneurysm. 5. The color CT perfusion imaging shows no abnormal cerebral blood flow less than 30% by volume. However there is a large area of Tmax greater than 6 seconds by volume located in the left MCA territory. This has a volume of 55 mL.      Radiation dose reduction techniques were utilized, including automated exposure control and exposure modulation based on body size.  This report was finalized on 3/7/2021 1:16 PM by Dr. Macho Clifford M.D.        I ordered the above noted radiological studies. Images have been reviewed by me and discussed with radiologist.  See dictation for official radiology interpretation.      PROCEDURES    Critical Care  Performed by: Sd Sheehan II, MD  Authorized by: Sd Sheehan II, MD     Critical care provider statement:     Critical care time (minutes):  45    Critical care was necessary to treat or prevent imminent or life-threatening deterioration of the following conditions:  CNS failure or compromise    Critical care was time spent personally by me on the following activities:  Ordering and performing treatments and interventions, ordering and review of laboratory studies, ordering and review of radiographic studies, pulse oximetry, re-evaluation of patient's condition, obtaining history from patient or surrogate, discussions with consultants, evaluation of patient's response to treatment, examination of patient and ventilator management  Intubation    Date/Time: 3/7/2021 4:04 PM  Performed by: Sd Sheehan II, MD  Authorized by: Sd Sheehan II, MD     Consent:     Consent obtained:  Verbal    Consent given by:   Spouse    Risks discussed:  Death, hypoxia, bleeding and brain injury    Alternatives discussed:  No treatment  Pre-procedure details:     Patient status:  Altered mental status    Pretreatment medications:  Fentanyl (etomidate)    Paralytics:  Rocuronium  Procedure details:     Preoxygenation:  Nonrebreather mask    CPR in progress: no      Intubation method:  Oral    Oral intubation technique:  Video-assisted    Laryngoscope size: s4.    Tube size (mm):  8.0    Tube type:  Cuffed    Number of attempts:  1  Placement assessment:     ETT to lip:  24    Tube secured with:  ETT beltran    Breath sounds:  Equal    Placement verification: chest rise, condensation, CXR verification, equal breath sounds and ETCO2 detector      CXR findings:  ETT in proper place  Post-procedure details:     Patient tolerance of procedure:  Tolerated well, no immediate complications          MEDICATIONS GIVEN IN ER    Medications   sodium chloride 0.9 % flush 10 mL ( Intravenous MAR Hold 3/7/21 1228)   aspirin suppository 600 mg ( Rectal MAR Hold 3/7/21 1228)   phenylephrine (CLARY-SYNEPHRINE) injection 100 mcg ( Intravenous MAR Hold 3/7/21 1228)   sodium chloride 0.9 % bolus 1,000 mL ( Intravenous MAR Hold 3/7/21 1228)   fentaNYL citrate (PF) (SUBLIMAZE) injection 100 mcg ( Intravenous MAR Hold 3/7/21 1228)   fentaNYL citrate (SUBLIMAZE) 1250 mcg in sodium chloride 0.9% 250 mL infusion (has no administration in time range)   sodium chloride 0.9 % infusion 100 mL ( Intravenous MAR Hold 3/7/21 1228)   alteplase (ACTIVASE) 9 mg in sterile water (preservative free) 9 mL bolus ( Intravenous MAR Hold 3/7/21 1228)   sodium chloride 0.9 % infusion 100 mL ( Intravenous MAR Hold 3/7/21 1228)   propofol (DIPRIVAN) infusion 10 mg/mL 100 mL (has no administration in time range)   alteplase (ACTIVASE) 100 MG injection  - ADS Override Pull (0 mg  Stopped 3/7/21 1117)   LORazepam (ATIVAN) injection 2 mg (2 mg Intravenous Given 3/7/21 0925)   iopamidol  (ISOVUE-370) 76 % injection 150 mL (150 mL Intravenous Given by Other 3/7/21 1109)   etomidate (AMIDATE) injection 38.1 mg (40 mg Intravenous Given 3/7/21 1138)   rocuronium (ZEMURON) injection 127 mg (120 mg Intravenous Given 3/7/21 1138)   alteplase (ACTIVASE) 81 mg in sterile water (preservative free) 81 mL (1 mg/mL) infusion (81 mg Intravenous New Bag 3/7/21 1122)         PROGRESS, DATA ANALYSIS, CONSULTS, AND MEDICAL DECISION MAKING    All labs have been independently reviewed by me.  All radiology studies have been reviewed by me and discussed with radiologist dictating report.   EKG's independently viewed and interpreted by me.  Discussion below represents my analysis of pertinent findings related to patient's condition, differential diagnosis, treatment plan and final disposition.        ED Course as of Mar 07 1523   Sun Mar 07, 2021   1043 On medical chart review, I see the patient has a history of seizures and takes aspirin.  I see no anticoagulants on his chart.  He was last seen in the emergency department in 2016.    [TD]   1044 Attempted to call a phone number on record to obtain additional history.  I was unable to do so.    [TD]   1108 On repeat exam, patient is now showing some effort against gravity in the right arm and leg    [TD]   1157 Blood pressure post intubation is 206/111.  Propofol is being started which will tend to lower his blood pressure.  We did have some difficulty with post intubation hypoxemia with aniket 84%.  His PEEP is now 10 and I am sitting up.  His oxygenation is now 98% percent    [TD]   1158 EKG interpreted by myself.  Time 11:16 AM.  Sinus rhythm.  Heart rate 71.  No acute ST abnormality.  Normal axis.  Normal intervals.    [TD]   1226 Patient is a [facility] patient.  However, given the acute condition that he is having, he is certainly unstable for transfer at this time as this would put the patient at unreasonable harm.    [TD]   1226 COVID19: Not Detected [TD]      ED Course  User Index  [TD] Sd Sheehan II, MD           PPE: Both the patient and I wore a surgical mask throughout the entire patient encounter.     AS OF 15:23 EST VITALS:    BP - (!) 206/11  HR - 64  TEMP -    02 SATS - 96%        DIAGNOSIS  Final diagnoses:   Cerebrovascular accident (CVA), unspecified mechanism (CMS/HCC)         DISPOSITION  Send to OR         Sd Sheehan II, MD  03/07/21 1173

## 2021-03-07 NOTE — ED NOTES
On transferring pt on stretcher in CT scan a bag of marijuana was found. Given to security ([employer]) via MIKIE Meredith (Charge Nurse).     Rubi Zheng, RN  03/07/21 1132       Rubi Zheng, MIKIE  03/07/21 1213

## 2021-03-07 NOTE — H&P
.     Admission Note    Patient Identification:  Jose R Dalton  60 y.o.  male  1960  [id number]            CC: Altered mental status new neurological changes    History of Present Illness:  Patient is a 60-year-old who presents to the ICU status post TPA thrombectomy and stenting for an acute stroke.  He arrives in the ICU intubated.  Discussed with our stroke neurologist.  And I called ER physician and discussed with him as well.  My involvement in his care began around 330.  Patient is intubated unable to give me a reliable history however chart review and discussion with other physicians involved in his care the history that I can summarize as as below.    Patient has a history of a right MCA however per history obtained he was not on any medications.  He developed aphasia and right-sided weakness with left eye gaze deviation with altered level of consciousness.  A CT scan in the ER showed concern for stroke.  Patient was taken for the above interventions and presents to the ICU at present time.      Past Medical History:   Diagnosis Date   • Elevated cholesterol    • Hypertension    • Obesity    • Seizure (CMS/HCC)    • Seizures (CMS/HCC)    • Stroke (CMS/HCC)        Past Surgical History:   Procedure Laterality Date   • CAROTID ENDARTERECTOMY Right    • CAROTID ENDARTERECTOMY Right         Social History     Socioeconomic History   • Marital status: Single     Spouse name: Not on file   • Number of children: Not on file   • Years of education: Not on file   • Highest education level: Not on file   Tobacco Use   • Smoking status: Current Every Day Smoker     Packs/day: 0.50   • Smokeless tobacco: Never Used   Substance and Sexual Activity   • Alcohol use: No   • Drug use: Yes     Types: Cocaine(coke), Marijuana     Comment: positive drug screen in 2016, current possession of marijuana 3/7/21   • Sexual activity: Defer       History reviewed. No pertinent family history.    Medications Prior to Admission    Medication Sig Dispense Refill Last Dose   • aspirin 325 MG tablet Take 325 mg by mouth daily.      • hydrOXYzine (ATARAX) 25 MG tablet Take 1 tablet by mouth every 6 (six) hours as needed for itching. 20 tablet 0    • MethylPREDNISolone (MEDROL, WAYNE,) 4 MG tablet Take as directed on package instructions. 1 each 0        No Known Allergies    Review of Systems:  Unable to obtain accurate ROS as patient is unable to answer questions due to intubated status.  Physical Exam:  Vitals:  Vitals:    03/07/21 1541 03/07/21 1545 03/07/21 1556 03/07/21 1601   BP: 149/75 148/76  142/75   BP Location:       Pulse: 68 68 69 71   Resp:       Temp:       TempSrc:       SpO2: 100% 99% 100% 97%   Weight:       Height:         FiO2 (%): 50 %     Body mass index is 36.97 kg/m².    Intake/Output Summary (Last 24 hours) at 3/7/2021 1605  Last data filed at 3/7/2021 1500  Gross per 24 hour   Intake 1250 ml   Output 100 ml   Net 1150 ml       Exam:  General appearance: NAD, conversant   Eyes: anicteric sclerae, moist conjunctivae; no lid-lag; PERRLA  HENT: Atraumatic; oropharynx clear with moist mucous membranes and no mucosal ulcerations; normal hard and soft palate  Neck: Trachea midline; FROM, supple, no thyromegaly or lymphadenopathy  Lungs: CTA, with normal respiratory effort and no intercostal retractions  CV: RRR, no MRGs   Abdomen: Soft, non-tender; no masses or HSM  Extremities: No peripheral edema or extremity lymphadenopathy  Skin: Normal temperature, turgor and texture; no rash, ulcers or subcutaneous nodules  Psych: Appropriate affect, alert and oriented to person, place and time        Scheduled meds:  aspirin, 600 mg, Rectal, Once  aspirin, 325 mg, Oral, Daily  clopidogrel, 600 mg, Oral, Once  [START ON 3/8/2021] clopidogrel, 75 mg, Oral, Daily  famotidine, 20 mg, Intravenous, Once  piperacillin-tazobactam, 3.375 g, Intravenous, Once  piperacillin-tazobactam, 3.375 g, Intravenous, Q8H  sodium chloride, 1,000 mL,  Intravenous, Once  sodium chloride, 3 mL, Intravenous, Q12H  sodium chloride, 100 mL, Intravenous, Once  sodium chloride, 100 mL, Intravenous, Once        Data Review:   I reviewed the patient's medications and new clinical results.  Lab Results   Component Value Date    CALCIUM 8.2 (L) 03/07/2021     Results from last 7 days   Lab Units 03/07/21  1106 03/07/21  1105   AST (SGOT) U/L 12  --    SODIUM mmol/L 141  --    POTASSIUM mmol/L 4.0  --    CHLORIDE mmol/L 107  --    CO2 mmol/L 25.9  --    BUN mg/dL 12  --    CREATININE mg/dL 0.99  --    GLUCOSE mg/dL 107*  --    CALCIUM mg/dL 8.2*  --    WBC 10*3/mm3  --  5.94   HEMOGLOBIN g/dL  --  11.6*   PLATELETS 10*3/mm3  --  190   ALT (SGPT) U/L 9  --      Results from last 7 days   Lab Units 03/07/21  1106   TROPONIN T ng/mL <0.010         Estimated Creatinine Clearance: 112.2 mL/min (by C-G formula based on SCr of 0.99 mg/dL).    IMAGING:  I have reviewed all imaging studies since my last documentation.  Imaging Results (Most Recent)     Procedure Component Value Units Date/Time    Arteriogram (Autofinalize) [[id number]] Resulted: 03/07/21 1559     Updated: 03/07/21 1559    Narrative:      This procedure was auto-finalized with no dictation required.    CT Angiogram Head [[id number]] Collected: 03/07/21 1126     Updated: 03/07/21 1319    Narrative:      CT ANGIOGRAPHY OF THE HEAD AND NECK WITHOUT AND WITH INTRAVENOUS  CONTRAST AND 3-D RECONSTRUCTIONS AND COLOR CT PERFUSION IMAGING OF THE  BRAIN WITH INTRAVENOUS CONTRAST     HISTORY: Recent onset of severe right-sided weakness. Aphasia. Team D  evaluation.     TECHNIQUE: The CT scan was performed as an emergency procedure through  the head and neck with CT angiography protocol without and with  intravenous contrast and 3-D reconstructions followed by color CT  perfusion imaging of the brain with contrast.      FINDINGS: The following findings are present:  1. An initial noncontrast CT scan of the brain was performed.  There is a  large area of old infarction in the right temporoparietal region similar  to the study of 02/24/2016. There is no evidence of intracranial  hemorrhage or mass effect. The findings of the noncontrast CT scan were  communicated to the team D physician when imaging made available at  10:50 AM. The postcontrast findings were communicated when imaging made  available at 11:06 AM.  2. Evaluation for stenosis is based on NASCET criteria. The vertebral  arteries are patent with slight dominance of the right vertebral artery.  The left vertebral artery is quite small in caliber and filling of the  basilar artery is predominantly via the right vertebral artery. The  right posterior communicating artery is patent.  3. The right cervical carotid artery shows no stenosis. The left  cervical carotid artery shows complete occlusion just beyond the origin  of the left internal carotid artery and I suspect this represents acute  occlusion. There is some faint opacification of the internal carotid  artery along the carotid canal at the base of skull.  4. The intracranial CT angiography shows some decreased opacification of  the left carotid siphon compared to the right. The anterior  communicating artery is patent and the right posterior communicating  artery is patent and therefore much of the left-sided circulation is  supplied from the right. No intracranial stenosis or thrombosis is  identified. There is no evidence of aneurysm.  5. The color CT perfusion imaging shows no abnormal cerebral blood flow  less than 30% by volume. However there is a large area of Tmax greater  than 6 seconds by volume located in the left MCA territory. This has a  volume of 55 mL.                 Radiation dose reduction techniques were utilized, including automated  exposure control and exposure modulation based on body size.     This report was finalized on 3/7/2021 1:16 PM by Dr. Macho Clifford M.D.       CT Angiogram Neck [[id number]]  Collected: 03/07/21 1126     Updated: 03/07/21 1319    Narrative:      CT ANGIOGRAPHY OF THE HEAD AND NECK WITHOUT AND WITH INTRAVENOUS  CONTRAST AND 3-D RECONSTRUCTIONS AND COLOR CT PERFUSION IMAGING OF THE  BRAIN WITH INTRAVENOUS CONTRAST     HISTORY: Recent onset of severe right-sided weakness. Aphasia. Team D  evaluation.     TECHNIQUE: The CT scan was performed as an emergency procedure through  the head and neck with CT angiography protocol without and with  intravenous contrast and 3-D reconstructions followed by color CT  perfusion imaging of the brain with contrast.      FINDINGS: The following findings are present:  1. An initial noncontrast CT scan of the brain was performed. There is a  large area of old infarction in the right temporoparietal region similar  to the study of 02/24/2016. There is no evidence of intracranial  hemorrhage or mass effect. The findings of the noncontrast CT scan were  communicated to the team D physician when imaging made available at  10:50 AM. The postcontrast findings were communicated when imaging made  available at 11:06 AM.  2. Evaluation for stenosis is based on NASCET criteria. The vertebral  arteries are patent with slight dominance of the right vertebral artery.  The left vertebral artery is quite small in caliber and filling of the  basilar artery is predominantly via the right vertebral artery. The  right posterior communicating artery is patent.  3. The right cervical carotid artery shows no stenosis. The left  cervical carotid artery shows complete occlusion just beyond the origin  of the left internal carotid artery and I suspect this represents acute  occlusion. There is some faint opacification of the internal carotid  artery along the carotid canal at the base of skull.  4. The intracranial CT angiography shows some decreased opacification of  the left carotid siphon compared to the right. The anterior  communicating artery is patent and the right posterior  communicating  artery is patent and therefore much of the left-sided circulation is  supplied from the right. No intracranial stenosis or thrombosis is  identified. There is no evidence of aneurysm.  5. The color CT perfusion imaging shows no abnormal cerebral blood flow  less than 30% by volume. However there is a large area of Tmax greater  than 6 seconds by volume located in the left MCA territory. This has a  volume of 55 mL.                 Radiation dose reduction techniques were utilized, including automated  exposure control and exposure modulation based on body size.     This report was finalized on 3/7/2021 1:16 PM by Dr. Macho Clifford M.D.       CT CEREBRAL PERFUSION W WO CONTRAST W AI ANALYSIS OF LVO [[id number]] Collected: 03/07/21 1126     Updated: 03/07/21 1319    Narrative:      CT ANGIOGRAPHY OF THE HEAD AND NECK WITHOUT AND WITH INTRAVENOUS  CONTRAST AND 3-D RECONSTRUCTIONS AND COLOR CT PERFUSION IMAGING OF THE  BRAIN WITH INTRAVENOUS CONTRAST     HISTORY: Recent onset of severe right-sided weakness. Aphasia. Team D  evaluation.     TECHNIQUE: The CT scan was performed as an emergency procedure through  the head and neck with CT angiography protocol without and with  intravenous contrast and 3-D reconstructions followed by color CT  perfusion imaging of the brain with contrast.      FINDINGS: The following findings are present:  1. An initial noncontrast CT scan of the brain was performed. There is a  large area of old infarction in the right temporoparietal region similar  to the study of 02/24/2016. There is no evidence of intracranial  hemorrhage or mass effect. The findings of the noncontrast CT scan were  communicated to the team D physician when imaging made available at  10:50 AM. The postcontrast findings were communicated when imaging made  available at 11:06 AM.  2. Evaluation for stenosis is based on NASCET criteria. The vertebral  arteries are patent with slight dominance of the right  vertebral artery.  The left vertebral artery is quite small in caliber and filling of the  basilar artery is predominantly via the right vertebral artery. The  right posterior communicating artery is patent.  3. The right cervical carotid artery shows no stenosis. The left  cervical carotid artery shows complete occlusion just beyond the origin  of the left internal carotid artery and I suspect this represents acute  occlusion. There is some faint opacification of the internal carotid  artery along the carotid canal at the base of skull.  4. The intracranial CT angiography shows some decreased opacification of  the left carotid siphon compared to the right. The anterior  communicating artery is patent and the right posterior communicating  artery is patent and therefore much of the left-sided circulation is  supplied from the right. No intracranial stenosis or thrombosis is  identified. There is no evidence of aneurysm.  5. The color CT perfusion imaging shows no abnormal cerebral blood flow  less than 30% by volume. However there is a large area of Tmax greater  than 6 seconds by volume located in the left MCA territory. This has a  volume of 55 mL.                 Radiation dose reduction techniques were utilized, including automated  exposure control and exposure modulation based on body size.     This report was finalized on 3/7/2021 1:16 PM by Dr. Macho Clifford M.D.       XR Chest 1 View [[id number]] Collected: 03/07/21 1200     Updated: 03/07/21 1205    Narrative:      XR CHEST 1 VW-  3/7/2021     HISTORY: Acute stroke protocol.     The heart size is at the upper limits of normal. There are some  bilateral interstitial disease more severe on the right than on the left  with more confluent increased density in the right lung base. There is  masslike area of increased soft tissue along the left paramediastinal  region. This is not seen on the previous study of 1/26/2016 and could  represent atelectasis, dense  consolidation or a mass.     Endotracheal tube is seen in good position with its tip overlying the  trachea at the level of the aortic arch.     No pneumothorax is seen.       Impression:      Borderline cardiomegaly.  2. Interstitial disease mostly on the right with more confluent  increased density in the right lung base. FINDINGS could represent  asymmetric pulmonary edema and/or pneumonia.  3. Masslike opacity in the left paramediastinal region could be related  to atelectasis, dense pneumonia and/or mass.  4. Endotracheal tube tip overlies the trachea.  5. Please correlate with the clinical findings. Short-term follow-up  chest radiograph and/or follow-up CT of the chest recommended.     This report was finalized on 3/7/2021 12:02 PM by Dr. Jay Jay Savage M.D.             ASSESSMENT /   PLAN:  Aspiration pneumonia  Acute stroke likely secondary to left carotid occlusion status post TPA and stenting  History of right MCA stroke previously  Aspiration pneumonia  Abnormal chest x-ray-question mediastinal mass or hilar mass send for CT chest  HTN  HLD  Seizure history  Smoker  H/o Polysubstance abuse    Start Zosyn for aspiration pneumonia concern  Blood cultures x2  CT scan as above  Vent reviewed FiO2 weaned down we will check an ABG  CT scan head  Leave intubated per stroke services plan on weaning trial in a.m. as patient allows.  Blood pressure control goal per neurosurgery Cardene on board a line in place  H/o polysubstance abuse - send urine tox screen    Total critical care time was 45 minutes, excluding any separately billable procedure time.  Time did not overlap with any other provider      Discussed care with Dr. Longoria as well as .  Appreciate their expertise and high level of care as always.        Deandre Rausch MD  [facility]  03/07/21  16:05 EST

## 2021-03-07 NOTE — OP NOTE
Description of Procedure  PRE-OPERATIVE DIAGNOSIS  Acute stroke with left proximal cervical ICA occlusion     POST-OPERATIVE DIAGNOSIS  Acute stroke with left proximal cervical ICA occlusion with successful revascularization with aspiration thrombectomy, angioplasty and stent placement.   Revascularization was completed with 2 steps.  First an 8 [place] balloon guide catheter was positioned in the distal common cervical ICA and inflated to prevent distal thrombus traveling towards the brain.  A microcatheter was advanced beyond the occluded segment of the left ICA and thrombus was aspirated from a large aspiration catheter.  A follow-up angiogram showed severe underlying flow-limiting stenosis.  The severe flow-limiting stenosis was then treated with an angioplasty and stent placement    SURGEON(S) AND ROLE  Juvencio Washington MD       Devices used:  5, 6, 8 Swedish sheaths  Catheters:  [place] balloon guide catheter, 5 [place] VTK,  react aspiration catheter  Wires: 0.035 Glidewire   Microcatheter:  Velocity  Microwire: Traxcess  Stent retriever: None  Closure device: 8 [place] Angio-Seal  Predilation balloon: [place] [employer] 3 mm x 40 mm  Post dilation balloon: 4 mm x 30 mm aviator  Stent: [place] [employer] Wallstent 10 mm x 31 mm  Filter: EZ filter 3.5 mm x 5.5 mm     PROCEDURE(S)  Catheterization of right femoral artery   Catheterization of left ICA  Thrombectomy with Penumbra suction x1 pass  Angioplasty and stent placement  Intra-arterial infusion of verapamil     Timeline:  Time in angio suite (TI):  1204  Puncture time (PT):  1214  Guide Cath 1232  Aspiration/thrombectomy at 13:16 with revascularization but severe flow-limiting stenosis         Indications for procedure and informed consent:  The patient is a 60-year-old male presented to the emergency room with signs and symptoms of an acute ischemic stroke.  He received a CTA and CT perfusion which showed a large area of penumbra in the left frontal and temporal  lobe.  There was a occlusion of the left proximal ICA.  Neurosurgery was consulted to evaluate for possible mechanical thrombectomy, angioplasty, and stent placement.  The risks and benefits of the procedure were discussed with the patient's fiancé and a consent was obtained..  The decision was made to proceed for emergent stroke intervention.       ANESTHESIA TYPE  General endotracheal anesthesia.  See anesthesia notes for complete details.       Monitoring:   Monitoring was done on continuous cardiac strips and continuous pulse oximeter      DESCRIPTION OF PROCEDURE   The patient was brought to the angio suite and was properly identified.  General anesthesia was administered  by the anesthesia team.  Both groins were prepared and draped in a sterile fashion.  Access to the right femoral artery was obtained using a ultrasound and micropuncture set.  A 5 [place] sheath was inserted into the right femoral artery.  A baseline angiogram showed mild stenosis and atherosclerotic disease but the puncture site was above the bifurcation and over the femoral head.  Next a 5 [place] angled glide cath was navigated into the left common carotid artery and a baseline angiogram showed complete occlusion of the proximal cervical ICA.  There was some reconstitution of the supraclinoid ICA through external carotid artery branches.  Because the patient's high NIH score and risk for poor outcome without intervention the decision was made to proceed with revascularization of the carotid artery.  The 5 [place] sheath was exchanged for an 8 [place] 10 cm sheath.  A balloon guide catheter was advanced into the aortic arch over a Glidewire and subsequently into the left common carotid artery over a 5 [place] VTK catheter.  The balloon was inflated and the common carotid artery to prevent thrombus from traveling distally towards the brain and the velocity microcatheter and aspiration catheter were advanced into the left ICA the microcatheter  advanced over the microwire beyond the occluded segment.  I was unable to advance the aspiration catheter beyond the origin of the left ICA.  Penumbra suction was then connected to the aspiration catheter and a small amount of thrombus was aspirated.  A follow-up angiogram demonstrated partial revascularization of the left ICA, but with severe flow-limiting stenosis.  Next a 3.5 mm x 5 mm EZ filter was advanced across the severe Ramsey stenotic segment.  A prestenting angioplasty was performed using a 3 mm x 40 mm balloon which was inflated to nominal pressure.  A follow-up angiogram showed improvement in the diameter of the left ICA however it still looked greatly diseased and somewhat flow limited.  A 10 mm x 31 mm Wallstent was selected and deployed across the stenotic ICA.  A follow-up angiogram showed moderate improvement in flow within the ICA.  Next a 4 mm x 30 mm aviator balloon was advanced over the EZ filter and a second angioplasty was performed.  A follow-up angiogram showed distal spasm.  The EZ filter was then retrieved.  A small amount of thrombus was found within the filter.  5 mg of verapamil was slowly infused.  Cranial angiograms were performed which demonstrated filling of the EDMUNDO and MCA branches without any obvious large vessel occlusion.  Final follow-up angiograms of the cervical ICA demonstrated complete revascularization without any residual stenosis.  The catheters were then pulled back and the groin was closed with an 8 [place] Angio-Seal closure device.  There were no obvious complications.  The patient was given 600 mg aspirin in the emergency room before the procedure as well as 10,000 units of heparin.  His ACT was measured at 280 at the time of stent placement.        Interpretation of the films:  1. Right femoral artery, oblique view: The puncture site is above the bifurcation and there is no extravasation of contrast.  There is moderate atherosclerotic disease and mild stenosis  2.    Left common carotid artery, cervical views AP lateral, and obliques: The  run shows the common carotid artery with   complete occlusion of the proximal left ICA.  The external carotid artery branches are seen filling normally however the left ICA ends in a stump shortly after the origin.  Subsequent images demonstrate positioning of the 8 [place] balloon guide catheter in the common carotid artery and subsequent inflation of the balloon.  Next the microcatheter can be seen advancing beyond the occluded segment.  There is a follow-up angiogram demonstrating partial revascularization after aspiration of thrombus, but with severe flow-limiting stenosis.  The next set of images shows prestent angioplasty with the 3 mm x 40 mm balloon, placement of the 10 mm x 30 mm stent, post stent angioplasty with the 4 mm x 30 mm balloon.  The final follow-up cervical images demonstrate complete revascularization with no residual stenosis.   3.  Left common carotid artery, AP and lateral cranial views.  This run shows slight filling of the supraclinoid ICA through external carotid artery branches.  There is no significant anterograde filling of the left ICA.  The final follow-up cranial angiogram demonstrates complete revascularization with normal filling of the MCA and EDMUNDO branches.  There is no evidence of large vessel occlusion

## 2021-03-07 NOTE — ANESTHESIA PREPROCEDURE EVALUATION
Anesthesia Evaluation     Patient summary reviewed and Nursing notes reviewed   NPO Solid Status: > 8 hours  NPO Liquid Status: > 4 hours           Airway   Mallampati: II  Neck ROM: full  No difficulty expected  Comment: Intubated, ambu ventilated transport to OR  Dental - normal exam     Pulmonary     breath sounds clear to auscultation  (+) a smoker Current,   Cardiovascular     Rhythm: regular    (+) PVD,  carotid artery disease      Neuro/Psych  (+) seizures,     GI/Hepatic/Renal/Endo    (+) obesity, morbid obesity,      Musculoskeletal     Abdominal   (+) obese,    Substance History      OB/GYN          Other          Other Comment: Arrived intubated ? Aspiration prior to arrival in OR, dark liquid material suctioned from ET tube                Anesthesia Plan    ASA 4 - emergent     general     intravenous induction   Postoperative Plan: Expected vent after surgery  Anesthetic plan, all risks, benefits, and alternatives have been provided, discussed and informed consent has been obtained with: patient.

## 2021-03-07 NOTE — ED NOTES
Spouse states sudden onset of slurred speech and RT side paralysis at 0925 this AM    Mask placed on patient in triage. Triage staff wore appropriate PPE during interaction with patient.        Viri Juárez RN  03/07/21 0315

## 2021-03-07 NOTE — ANESTHESIA PROCEDURE NOTES
Arterial Line      Patient reassessed immediately prior to procedure    Patient location during procedure: OR  Start time: 3/7/2021 3:05 PM  Stop Time:3/7/2021 3:10 PM       Line placed for respiratory failure, hemodynamic monitoring and ABGs/Labs/ISTAT.  Performed By   Anesthesiologist: Gurwinder Magaña MD  Preanesthetic Checklist  Completed: patient identified, IV checked, site marked, risks and benefits discussed, surgical consent, monitors and equipment checked, pre-op evaluation and timeout performed  Arterial Line Prep   Sterile Tech: mask and cap  Prep: ChloraPrep  Patient monitoring: blood pressure monitoring, continuous pulse oximetry and EKG  Arterial Line Procedure   Laterality:left  Location:  radial artery  Catheter size: 20 G   Guidance: landmark technique  Number of attempts: 1  Successful placement: yes  Post Assessment   Dressing Type: occlusive dressing applied, secured with tape and wrist guard applied.   Complications no  Circ/Move/Sens Assessment: unchanged.   Patient Tolerance: patient tolerated the procedure well with no apparent complications

## 2021-03-07 NOTE — CONSULTS
"Neurology Consult Note    Consult Date: 3/7/2021    Referring MD: Dr. Sheehan    Reason for Consult I have been asked to see the patient in neurological consultation to render advice and opinion regarding acute stroke    Jose R Dalton is a 60 y.o. male with reported history of epilepsy, prior right MCA stroke. I obtained the history from his fiance. She reports LKN approximately 0930 when he developed aphasia and right sided weakness. He has a history of prior stroke but at baseline ambulates independently and has no functional limitations. On initial arrival to the emergency department he had a high NIH stroke scale for fixed left gaze deviation, decreased level consciousness and flaccid right hemiplegia. By the time CT scan was completed the patient remained globally aphasic but began moving the right arm and leg. No witnessed seizure activity    TPA was initially delayed due to lack of available history about home medications. Order for TPA was given once patient's fiance arrived and was able to give additional information.    Imaging acquisition was significantly delayed by patient agitation and inability to hold still for CTA. Ativan 2mg was given.    Past Medical History:   Diagnosis Date   • Seizures (CMS/HCC)        ROS:  No fevers, chills  + Weakness, aphasia    Exam: Initial exam in ER CT  /85   Pulse 71   Resp 18   Ht 185.4 cm (73\")   Wt 127 kg (280 lb 3.2 oz)   SpO2 98%   BMI 36.97 kg/m²   Gen: NAD, vitals reviewed  MS: Decreased level consciousness, globally aphasic, not following commands  CN: PERRL, fixed left gaze deviation, diminished right nasolabial fold, tongue midline  Motor: 0/5 flaccid right upper and lower extremity, moving left upper extremity spontaneously  Reflexes: Right plantar upgoing, left plantar downgoing    DATA:    Lab Results   Component Value Date    GLUCOSE 96 02/24/2016    CALCIUM 9.2 02/24/2016     02/24/2016    K 3.5 02/24/2016    CO2 27 02/24/2016     " 02/24/2016    BUN 9 02/24/2016    CREATININE 0.79 02/24/2016     Lab Results   Component Value Date    WBC 5.94 03/07/2021    HGB 11.6 (L) 03/07/2021    HCT 35.3 (L) 03/07/2021    MCV 80.6 03/07/2021     03/07/2021       Lab review: Globin 11.6, platelets 190    Imaging review: I personally reviewed his CT head which shows a chronic right MCA inferior division infarct, no complete infarct in the left hemisphere, CT perfusion shows large area of tissue at risk in the left hemisphere, CTA shows a proximal left carotid occlusion with some flow in the left MCA territory likely from his anterior communicating artery    Diagnoses:  Stroke due to left carotid occlusion  Received TPA in the emergency department  History of right MCA stroke    Comment: Acute left carotid occlusion, status post TPA. Will go to the OR for attempted clot retrieval and possibly emergent carotid stent    PLAN:  Status post TPA, goal blood pressure less than 180/105  To OR for attempted recanalization of left internal carotid artery    As mentioned above, there were multiple delays in TPA administration due to initially limited available history and difficulty obtaining imaging due to agitation.    Addendum: Successful thrombectomy, angioplasty and stenting of left carotid >90% stenotic left ICA with acute occlusion. ASA rectal 600mg given. Post op CT negative for ICH or large completed stroke. Will load Plavix to prevent stent occlusion. Risk of symptomatic ICH is less than risk of stent occlusion at this point based on current imaging. Patient currently remains intubated due to aspiration PNA.

## 2021-03-07 NOTE — PERIOPERATIVE NURSING NOTE
Pre-procedure NIH:  Decision time:  In room : 1204  Procedure start: 1213  Arterial access Puncture time: 1214  Guide catheter placement time: 1232  First thrombectomy device placement time (first pass):  1316  Subsequent device placement time:  Time of recanalization:  1316  Final TICI Flow: 3  Procedure End time:  1439    **These values were verbally verified with physician performing procedure.

## 2021-03-07 NOTE — PLAN OF CARE
Goal Outcome Evaluation:      Pt recently admitted s/p TPA administration and surgical intervention for acute CVA. BP labile and requiring significant medication to control. Pt currently on ventilator for asp. Thick black secretions suctioned from lungs. NSR on monitor. Sedation required to help keep blood pressure in range. Patient armin has called several times for updates.

## 2021-03-07 NOTE — ED NOTES
Pt intubated prior to going to OR, started on sedation. Unable to complete appropriate post TPA assessment.     Rubi Zheng, RN  03/07/21 1174

## 2021-03-08 ENCOUNTER — APPOINTMENT (OUTPATIENT)
Dept: CT IMAGING | Facility: HOSPITAL | Age: 61
End: 2021-03-08

## 2021-03-08 ENCOUNTER — APPOINTMENT (OUTPATIENT)
Dept: CARDIOLOGY | Facility: HOSPITAL | Age: 61
End: 2021-03-08

## 2021-03-08 LAB
ALBUMIN SERPL-MCNC: 3.6 G/DL (ref 3.5–5.2)
ALBUMIN/GLOB SERPL: 1.6 G/DL
ALP SERPL-CCNC: 46 U/L (ref 39–117)
ALT SERPL W P-5'-P-CCNC: 7 U/L (ref 1–41)
ANION GAP SERPL CALCULATED.3IONS-SCNC: 14.1 MMOL/L (ref 5–15)
AORTIC DIMENSIONLESS INDEX: 0.8 (DI)
APTT PPP: 33.6 SECONDS (ref 22.7–35.4)
ARTERIAL PATENCY WRIST A: ABNORMAL
ASCENDING AORTA: 3.3 CM
AST SERPL-CCNC: 12 U/L (ref 1–40)
ATMOSPHERIC PRESS: 761.2 MMHG
AV HCM GRAD VALS: 44 MMHG
AV LVOT PEAK GRADIENT: 32 MMHG
BASE EXCESS BLDA CALC-SCNC: -4 MMOL/L (ref 0–2)
BASOPHILS # BLD AUTO: 0.01 10*3/MM3 (ref 0–0.2)
BASOPHILS NFR BLD AUTO: 0.1 % (ref 0–1.5)
BDY SITE: ABNORMAL
BH CV ECHO MEAS - ACS: 2 CM
BH CV ECHO MEAS - AO MAX PG (FULL): 11.4 MMHG
BH CV ECHO MEAS - AO MAX PG: 27 MMHG
BH CV ECHO MEAS - AO MEAN PG (FULL): 3 MMHG
BH CV ECHO MEAS - AO MEAN PG: 11 MMHG
BH CV ECHO MEAS - AO ROOT AREA (BSA CORRECTED): 1.4
BH CV ECHO MEAS - AO ROOT AREA: 8.6 CM^2
BH CV ECHO MEAS - AO ROOT DIAM: 3.3 CM
BH CV ECHO MEAS - AO V2 MAX: 260 CM/SEC
BH CV ECHO MEAS - AO V2 MEAN: 154 CM/SEC
BH CV ECHO MEAS - AO V2 VTI: 40.6 CM
BH CV ECHO MEAS - ASC AORTA: 3.3 CM
BH CV ECHO MEAS - AVA(I,A): 2.8 CM^2
BH CV ECHO MEAS - AVA(I,D): 2.8 CM^2
BH CV ECHO MEAS - AVA(V,A): 2.6 CM^2
BH CV ECHO MEAS - AVA(V,D): 2.6 CM^2
BH CV ECHO MEAS - BSA(HAYCOCK): 2.5 M^2
BH CV ECHO MEAS - BSA: 2.4 M^2
BH CV ECHO MEAS - BZI_BMI: 33.9 KILOGRAMS/M^2
BH CV ECHO MEAS - BZI_METRIC_HEIGHT: 185.4 CM
BH CV ECHO MEAS - BZI_METRIC_WEIGHT: 116.6 KG
BH CV ECHO MEAS - EDV(CUBED): 125 ML
BH CV ECHO MEAS - EDV(MOD-SP2): 111 ML
BH CV ECHO MEAS - EDV(MOD-SP4): 140 ML
BH CV ECHO MEAS - EDV(TEICH): 118.2 ML
BH CV ECHO MEAS - EF(CUBED): 82.4 %
BH CV ECHO MEAS - EF(MOD-BP): 72.3 %
BH CV ECHO MEAS - EF(MOD-SP2): 71.2 %
BH CV ECHO MEAS - EF(MOD-SP4): 72.9 %
BH CV ECHO MEAS - EF(TEICH): 75 %
BH CV ECHO MEAS - ESV(CUBED): 22 ML
BH CV ECHO MEAS - ESV(MOD-SP2): 32 ML
BH CV ECHO MEAS - ESV(MOD-SP4): 38 ML
BH CV ECHO MEAS - ESV(TEICH): 29.6 ML
BH CV ECHO MEAS - FS: 44 %
BH CV ECHO MEAS - IVS/LVPW: 1.2
BH CV ECHO MEAS - IVSD: 1.5 CM
BH CV ECHO MEAS - LAT PEAK E' VEL: 9.9 CM/SEC
BH CV ECHO MEAS - LV DIASTOLIC VOL/BSA (35-75): 58.5 ML/M^2
BH CV ECHO MEAS - LV MASS(C)D: 283.9 GRAMS
BH CV ECHO MEAS - LV MASS(C)DI: 118.6 GRAMS/M^2
BH CV ECHO MEAS - LV MAX PG: 15.7 MMHG
BH CV ECHO MEAS - LV MEAN PG: 8 MMHG
BH CV ECHO MEAS - LV SYSTOLIC VOL/BSA (12-30): 15.9 ML/M^2
BH CV ECHO MEAS - LV V1 MAX: 198 CM/SEC
BH CV ECHO MEAS - LV V1 MEAN: 125 CM/SEC
BH CV ECHO MEAS - LV V1 VTI: 33.1 CM
BH CV ECHO MEAS - LVIDD: 5 CM
BH CV ECHO MEAS - LVIDS: 2.8 CM
BH CV ECHO MEAS - LVLD AP2: 8 CM
BH CV ECHO MEAS - LVLD AP4: 8.3 CM
BH CV ECHO MEAS - LVLS AP2: 6.5 CM
BH CV ECHO MEAS - LVLS AP4: 6.3 CM
BH CV ECHO MEAS - LVOT AREA (M): 3.5 CM^2
BH CV ECHO MEAS - LVOT AREA: 3.5 CM^2
BH CV ECHO MEAS - LVOT DIAM: 2.1 CM
BH CV ECHO MEAS - LVPWD: 1.3 CM
BH CV ECHO MEAS - MED PEAK E' VEL: 10.2 CM/SEC
BH CV ECHO MEAS - MV A DUR: 0.17 SEC
BH CV ECHO MEAS - MV A MAX VEL: 78.8 CM/SEC
BH CV ECHO MEAS - MV DEC SLOPE: 491 CM/SEC^2
BH CV ECHO MEAS - MV DEC TIME: 137 SEC
BH CV ECHO MEAS - MV E MAX VEL: 87.8 CM/SEC
BH CV ECHO MEAS - MV E/A: 1.1
BH CV ECHO MEAS - MV MAX PG: 3.2 MMHG
BH CV ECHO MEAS - MV MEAN PG: 1 MMHG
BH CV ECHO MEAS - MV P1/2T MAX VEL: 99 CM/SEC
BH CV ECHO MEAS - MV P1/2T: 59.1 MSEC
BH CV ECHO MEAS - MV V2 MAX: 89.7 CM/SEC
BH CV ECHO MEAS - MV V2 MEAN: 53.9 CM/SEC
BH CV ECHO MEAS - MV V2 VTI: 22.5 CM
BH CV ECHO MEAS - MVA P1/2T LCG: 2.2 CM^2
BH CV ECHO MEAS - MVA(P1/2T): 3.7 CM^2
BH CV ECHO MEAS - MVA(VTI): 5.1 CM^2
BH CV ECHO MEAS - PA ACC TIME: 0.14 SEC
BH CV ECHO MEAS - PA MAX PG (FULL): 0.72 MMHG
BH CV ECHO MEAS - PA MAX PG: 7 MMHG
BH CV ECHO MEAS - PA PR(ACCEL): 18.3 MMHG
BH CV ECHO MEAS - PA V2 MAX: 132 CM/SEC
BH CV ECHO MEAS - PULM A REVS DUR: 0.11 SEC
BH CV ECHO MEAS - PULM A REVS VEL: 24.4 CM/SEC
BH CV ECHO MEAS - PULM DIAS VEL: 62.8 CM/SEC
BH CV ECHO MEAS - PULM S/D: 1.4
BH CV ECHO MEAS - PULM SYS VEL: 90.6 CM/SEC
BH CV ECHO MEAS - PVA(V,A): 1.9 CM^2
BH CV ECHO MEAS - PVA(V,D): 1.9 CM^2
BH CV ECHO MEAS - QP/QS: 0.44
BH CV ECHO MEAS - RAP SYSTOLE: 15 MMHG
BH CV ECHO MEAS - RV MAX PG: 6.3 MMHG
BH CV ECHO MEAS - RV MEAN PG: 3 MMHG
BH CV ECHO MEAS - RV V1 MAX: 125 CM/SEC
BH CV ECHO MEAS - RV V1 MEAN: 83.3 CM/SEC
BH CV ECHO MEAS - RV V1 VTI: 25.3 CM
BH CV ECHO MEAS - RVOT AREA: 2 CM^2
BH CV ECHO MEAS - RVOT DIAM: 1.6 CM
BH CV ECHO MEAS - RVSP: 46.4 MMHG
BH CV ECHO MEAS - SI(AO): 145.1 ML/M^2
BH CV ECHO MEAS - SI(CUBED): 43 ML/M^2
BH CV ECHO MEAS - SI(LVOT): 47.9 ML/M^2
BH CV ECHO MEAS - SI(MOD-SP2): 33 ML/M^2
BH CV ECHO MEAS - SI(MOD-SP4): 42.6 ML/M^2
BH CV ECHO MEAS - SI(TEICH): 37.1 ML/M^2
BH CV ECHO MEAS - SV(AO): 347.3 ML
BH CV ECHO MEAS - SV(CUBED): 103 ML
BH CV ECHO MEAS - SV(LVOT): 114.6 ML
BH CV ECHO MEAS - SV(MOD-SP2): 79 ML
BH CV ECHO MEAS - SV(MOD-SP4): 102 ML
BH CV ECHO MEAS - SV(RVOT): 50.9 ML
BH CV ECHO MEAS - SV(TEICH): 88.7 ML
BH CV ECHO MEAS - TAPSE (>1.6): 2.2 CM
BH CV ECHO MEAS - TR MAX VEL: 280 CM/SEC
BH CV ECHO MEASUREMENTS AVERAGE E/E' RATIO: 8.74
BH CV LEFT CCA HIDDEN LRR: 1 CM/S
BH CV VAS BP LEFT ARM: NORMAL MMHG
BH CV VAS CAROTID LEFT DISTAL STENT EDV: 27 CM/S
BH CV VAS CAROTID LEFT DISTAL STENT: 170 CM/S
BH CV VAS CAROTID LEFT DISTAL TO STENT EDV: 20 CM/S
BH CV VAS CAROTID LEFT DISTAL TO STENT: 145 CM/S
BH CV VAS CAROTID LEFT MID STENT EDV: 14 CM/S
BH CV VAS CAROTID LEFT MID STENT: 90 CM/S
BH CV VAS CAROTID LEFT PROXIMAL STENT EDV: 21 CM/S
BH CV VAS CAROTID LEFT PROXIMAL STENT: 138 CM/S
BH CV VAS CAROTID LEFT PROXIMAL TO STENT: 158 CM/S
BH CV VAS CAROTID LEFT STENT NATIVE VESSEL PROXIMAL ED: 11 CM/S
BH CV XLRA - RV BASE: 3.6 CM
BH CV XLRA - RV LENGTH: 8.3 CM
BH CV XLRA - RV MID: 2.7 CM
BH CV XLRA - TDI S': 11.3 CM/SEC
BH CV XLRA MEAS LEFT DIST CCA EDV: 19.4 CM/SEC
BH CV XLRA MEAS LEFT DIST CCA PSV: 137.5 CM/SEC
BH CV XLRA MEAS LEFT DIST ICA EDV: 14 CM/SEC
BH CV XLRA MEAS LEFT DIST ICA PSV: 115 CM/SEC
BH CV XLRA MEAS LEFT MID ICA EDV: -24.2 CM/SEC
BH CV XLRA MEAS LEFT MID ICA PSV: -146.2 CM/SEC
BH CV XLRA MEAS LEFT PROX CCA EDV: 21.3 CM/SEC
BH CV XLRA MEAS LEFT PROX CCA PSV: 175.3 CM/SEC
BH CV XLRA MEAS LEFT PROX ECA EDV: -14.6 CM/SEC
BH CV XLRA MEAS LEFT PROX ECA PSV: -226.8 CM/SEC
BH CV XLRA MEAS LEFT PROX ICA EDV: -16.2 CM/SEC
BH CV XLRA MEAS LEFT PROX ICA PSV: -98.8 CM/SEC
BH CV XLRA MEAS LEFT PROX SCLA PSV: 296.3 CM/SEC
BH CV XLRA MEAS LEFT VERTEBRAL A EDV: -7.8 CM/SEC
BH CV XLRA MEAS LEFT VERTEBRAL A PSV: -128.2 CM/SEC
BILIRUB SERPL-MCNC: 0.3 MG/DL (ref 0–1.2)
BUN SERPL-MCNC: 16 MG/DL (ref 6–20)
BUN/CREAT SERPL: 11.4 (ref 7–25)
CALCIUM SPEC-SCNC: 7.8 MG/DL (ref 8.6–10.5)
CHLORIDE SERPL-SCNC: 109 MMOL/L (ref 98–107)
CHOLEST SERPL-MCNC: 227 MG/DL (ref 0–200)
CO2 SERPL-SCNC: 15.9 MMOL/L (ref 22–29)
CREAT SERPL-MCNC: 1.4 MG/DL (ref 0.76–1.27)
DEPRECATED RDW RBC AUTO: 41.3 FL (ref 37–54)
DEPRECATED RDW RBC AUTO: 41.5 FL (ref 37–54)
EOSINOPHIL # BLD AUTO: 0 10*3/MM3 (ref 0–0.4)
EOSINOPHIL NFR BLD AUTO: 0 % (ref 0.3–6.2)
ERYTHROCYTE [DISTWIDTH] IN BLOOD BY AUTOMATED COUNT: 14.4 % (ref 12.3–15.4)
ERYTHROCYTE [DISTWIDTH] IN BLOOD BY AUTOMATED COUNT: 14.4 % (ref 12.3–15.4)
GFR SERPL CREATININE-BSD FRML MDRD: 63 ML/MIN/1.73
GLOBULIN UR ELPH-MCNC: 2.3 GM/DL
GLUCOSE BLDC GLUCOMTR-MCNC: 123 MG/DL (ref 70–130)
GLUCOSE BLDC GLUCOMTR-MCNC: 126 MG/DL (ref 70–130)
GLUCOSE BLDC GLUCOMTR-MCNC: 129 MG/DL (ref 70–130)
GLUCOSE BLDC GLUCOMTR-MCNC: 164 MG/DL (ref 70–130)
GLUCOSE SERPL-MCNC: 143 MG/DL (ref 65–99)
HBA1C MFR BLD: 5.9 % (ref 4.8–5.6)
HCO3 BLDA-SCNC: 19.9 MMOL/L (ref 22–28)
HCT VFR BLD AUTO: 37.8 % (ref 37.5–51)
HCT VFR BLD AUTO: 37.9 % (ref 37.5–51)
HDLC SERPL-MCNC: 54 MG/DL (ref 40–60)
HGB BLD-MCNC: 12.2 G/DL (ref 13–17.7)
HGB BLD-MCNC: 12.5 G/DL (ref 13–17.7)
IMM GRANULOCYTES # BLD AUTO: 0.09 10*3/MM3 (ref 0–0.05)
IMM GRANULOCYTES NFR BLD AUTO: 0.5 % (ref 0–0.5)
INHALED O2 CONCENTRATION: 25 %
INR PPP: 1.35 (ref 0.9–1.1)
LDLC SERPL CALC-MCNC: 147 MG/DL (ref 0–100)
LDLC/HDLC SERPL: 2.67 {RATIO}
LEFT ATRIUM VOLUME INDEX: 41 ML/M2
LV EF 2D ECHO EST: 70 %
LYMPHOCYTES # BLD AUTO: 0.96 10*3/MM3 (ref 0.7–3.1)
LYMPHOCYTES NFR BLD AUTO: 5.4 % (ref 19.6–45.3)
MAXIMAL PREDICTED HEART RATE: 161 BPM
MCH RBC QN AUTO: 26.1 PG (ref 26.6–33)
MCH RBC QN AUTO: 26.6 PG (ref 26.6–33)
MCHC RBC AUTO-ENTMCNC: 32.3 G/DL (ref 31.5–35.7)
MCHC RBC AUTO-ENTMCNC: 33 G/DL (ref 31.5–35.7)
MCV RBC AUTO: 80.6 FL (ref 79–97)
MCV RBC AUTO: 80.8 FL (ref 79–97)
MODALITY: ABNORMAL
MONOCYTES # BLD AUTO: 0.75 10*3/MM3 (ref 0.1–0.9)
MONOCYTES NFR BLD AUTO: 4.3 % (ref 5–12)
NEUTROPHILS NFR BLD AUTO: 15.83 10*3/MM3 (ref 1.7–7)
NEUTROPHILS NFR BLD AUTO: 89.7 % (ref 42.7–76)
NRBC BLD AUTO-RTO: 0 /100 WBC (ref 0–0.2)
O2 A-A PPRESDIFF RESPIRATORY: 0.5 MMHG
PA ADP PRP-ACNC: 261 PRU (ref 194–418)
PA ADP PRP-ACNC: 303 PRU (ref 194–418)
PA ADP PRP-ACNC: 37 PRU (ref 194–418)
PCO2 BLDA: 32.3 MM HG (ref 35–45)
PEEP RESPIRATORY: 5 CM[H2O]
PH BLDA: 7.4 PH UNITS (ref 7.35–7.45)
PLATELET # BLD AUTO: 212 10*3/MM3 (ref 140–450)
PLATELET # BLD AUTO: 222 10*3/MM3 (ref 140–450)
PMV BLD AUTO: 10.6 FL (ref 6–12)
PMV BLD AUTO: 11.1 FL (ref 6–12)
PO2 BLDA: 67.9 MM HG (ref 80–100)
POTASSIUM SERPL-SCNC: 4.4 MMOL/L (ref 3.5–5.2)
PROT SERPL-MCNC: 5.9 G/DL (ref 6–8.5)
PROTHROMBIN TIME: 16.4 SECONDS (ref 11.7–14.2)
RBC # BLD AUTO: 4.68 10*6/MM3 (ref 4.14–5.8)
RBC # BLD AUTO: 4.7 10*6/MM3 (ref 4.14–5.8)
SAO2 % BLDCOA: 93.5 % (ref 92–99)
SET MECH RESP RATE: 22
SINUS: 3.2 CM
SODIUM SERPL-SCNC: 139 MMOL/L (ref 136–145)
STJ: 3 CM
STRESS TARGET HR: 137 BPM
TOTAL RATE: 27 BREATHS/MINUTE
TRIGL SERPL-MCNC: 143 MG/DL (ref 0–150)
VENTILATOR MODE: ABNORMAL
VLDLC SERPL-MCNC: 26 MG/DL (ref 5–40)
VT ON VENT VENT: 500 ML
WBC # BLD AUTO: 13.38 10*3/MM3 (ref 3.4–10.8)
WBC # BLD AUTO: 17.64 10*3/MM3 (ref 3.4–10.8)

## 2021-03-08 PROCEDURE — 93880 EXTRACRANIAL BILAT STUDY: CPT

## 2021-03-08 PROCEDURE — 94799 UNLISTED PULMONARY SVC/PX: CPT

## 2021-03-08 PROCEDURE — 85576 BLOOD PLATELET AGGREGATION: CPT | Performed by: NEUROLOGICAL SURGERY

## 2021-03-08 PROCEDURE — 70450 CT HEAD/BRAIN W/O DYE: CPT

## 2021-03-08 PROCEDURE — 85025 COMPLETE CBC W/AUTO DIFF WBC: CPT | Performed by: NURSE PRACTITIONER

## 2021-03-08 PROCEDURE — 99024 POSTOP FOLLOW-UP VISIT: CPT | Performed by: NURSE PRACTITIONER

## 2021-03-08 PROCEDURE — 93882 EXTRACRANIAL UNI/LTD STUDY: CPT

## 2021-03-08 PROCEDURE — 82803 BLOOD GASES ANY COMBINATION: CPT

## 2021-03-08 PROCEDURE — 85027 COMPLETE CBC AUTOMATED: CPT | Performed by: NEUROLOGICAL SURGERY

## 2021-03-08 PROCEDURE — 25010000002 HEPARIN (PORCINE) PER 1000 UNITS: Performed by: NURSE PRACTITIONER

## 2021-03-08 PROCEDURE — 83036 HEMOGLOBIN GLYCOSYLATED A1C: CPT | Performed by: NEUROLOGICAL SURGERY

## 2021-03-08 PROCEDURE — 80053 COMPREHEN METABOLIC PANEL: CPT | Performed by: NEUROLOGICAL SURGERY

## 2021-03-08 PROCEDURE — 99232 SBSQ HOSP IP/OBS MODERATE 35: CPT | Performed by: PSYCHIATRY & NEUROLOGY

## 2021-03-08 PROCEDURE — 82962 GLUCOSE BLOOD TEST: CPT

## 2021-03-08 PROCEDURE — 85576 BLOOD PLATELET AGGREGATION: CPT | Performed by: NURSE PRACTITIONER

## 2021-03-08 PROCEDURE — 80061 LIPID PANEL: CPT | Performed by: NEUROLOGICAL SURGERY

## 2021-03-08 PROCEDURE — 85610 PROTHROMBIN TIME: CPT | Performed by: NURSE PRACTITIONER

## 2021-03-08 PROCEDURE — 93306 TTE W/DOPPLER COMPLETE: CPT

## 2021-03-08 PROCEDURE — 85730 THROMBOPLASTIN TIME PARTIAL: CPT | Performed by: NURSE PRACTITIONER

## 2021-03-08 PROCEDURE — 25010000002 PROPOFOL 10 MG/ML EMULSION: Performed by: NEUROLOGICAL SURGERY

## 2021-03-08 PROCEDURE — 25010000002 FENTANYL CITRATE (PF) 100 MCG/2ML SOLUTION: Performed by: NEUROLOGICAL SURGERY

## 2021-03-08 PROCEDURE — 25010000002 PERFLUTREN (DEFINITY) 8.476 MG IN SODIUM CHLORIDE (PF) 0.9 % 10 ML INJECTION: Performed by: PSYCHIATRY & NEUROLOGY

## 2021-03-08 PROCEDURE — 25010000002 PIPERACILLIN SOD-TAZOBACTAM PER 1 G: Performed by: NEUROLOGICAL SURGERY

## 2021-03-08 PROCEDURE — 93306 TTE W/DOPPLER COMPLETE: CPT | Performed by: INTERNAL MEDICINE

## 2021-03-08 PROCEDURE — 25010000002 HYDRALAZINE PER 20 MG: Performed by: PSYCHIATRY & NEUROLOGY

## 2021-03-08 PROCEDURE — 25010000002 PROPOFOL 10 MG/ML EMULSION: Performed by: INTERNAL MEDICINE

## 2021-03-08 PROCEDURE — 94003 VENT MGMT INPAT SUBQ DAY: CPT

## 2021-03-08 RX ORDER — HYDRALAZINE HYDROCHLORIDE 50 MG/1
50 TABLET, FILM COATED ORAL EVERY 8 HOURS SCHEDULED
Status: DISCONTINUED | OUTPATIENT
Start: 2021-03-08 | End: 2021-03-09

## 2021-03-08 RX ORDER — HEPARIN SODIUM 10000 [USP'U]/100ML
12 INJECTION, SOLUTION INTRAVENOUS
Status: DISCONTINUED | OUTPATIENT
Start: 2021-03-08 | End: 2021-03-08

## 2021-03-08 RX ORDER — SODIUM CHLORIDE 9 MG/ML
125 INJECTION, SOLUTION INTRAVENOUS CONTINUOUS
Status: DISCONTINUED | OUTPATIENT
Start: 2021-03-08 | End: 2021-03-09

## 2021-03-08 RX ORDER — ASPIRIN 81 MG/1
81 TABLET, CHEWABLE ORAL DAILY
Status: DISCONTINUED | OUTPATIENT
Start: 2021-03-09 | End: 2021-03-11

## 2021-03-08 RX ADMIN — PROPOFOL 50 MCG/KG/MIN: 10 INJECTION, EMULSION INTRAVENOUS at 06:30

## 2021-03-08 RX ADMIN — CLOPIDOGREL 75 MG: 75 TABLET, FILM COATED ORAL at 09:07

## 2021-03-08 RX ADMIN — TAZOBACTAM SODIUM AND PIPERACILLIN SODIUM 3.38 G: 375; 3 INJECTION, SOLUTION INTRAVENOUS at 21:46

## 2021-03-08 RX ADMIN — HYDRALAZINE HYDROCHLORIDE 50 MG: 50 TABLET, FILM COATED ORAL at 14:09

## 2021-03-08 RX ADMIN — SODIUM CHLORIDE 15 MG/HR: 9 INJECTION, SOLUTION INTRAVENOUS at 08:59

## 2021-03-08 RX ADMIN — PROPOFOL 50 MCG/KG/MIN: 10 INJECTION, EMULSION INTRAVENOUS at 00:41

## 2021-03-08 RX ADMIN — ENALAPRILAT 0.62 MG: 2.5 INJECTION INTRAVENOUS at 09:24

## 2021-03-08 RX ADMIN — HYDRALAZINE HYDROCHLORIDE 50 MG: 50 TABLET, FILM COATED ORAL at 21:18

## 2021-03-08 RX ADMIN — FENTANYL CITRATE 100 MCG: 50 INJECTION, SOLUTION INTRAMUSCULAR; INTRAVENOUS at 20:42

## 2021-03-08 RX ADMIN — SODIUM CHLORIDE 125 ML/HR: 9 INJECTION, SOLUTION INTRAVENOUS at 13:50

## 2021-03-08 RX ADMIN — FENTANYL CITRATE 100 MCG: 50 INJECTION, SOLUTION INTRAMUSCULAR; INTRAVENOUS at 18:10

## 2021-03-08 RX ADMIN — METOPROLOL TARTRATE 5 MG: 1 INJECTION, SOLUTION INTRAVENOUS at 06:42

## 2021-03-08 RX ADMIN — ASPIRIN 325 MG: 325 TABLET ORAL at 09:07

## 2021-03-08 RX ADMIN — FENTANYL CITRATE 100 MCG: 50 INJECTION, SOLUTION INTRAMUSCULAR; INTRAVENOUS at 11:03

## 2021-03-08 RX ADMIN — TAZOBACTAM SODIUM AND PIPERACILLIN SODIUM 3.38 G: 375; 3 INJECTION, SOLUTION INTRAVENOUS at 06:41

## 2021-03-08 RX ADMIN — FENTANYL CITRATE 100 MCG: 50 INJECTION, SOLUTION INTRAMUSCULAR; INTRAVENOUS at 21:46

## 2021-03-08 RX ADMIN — FENTANYL CITRATE 100 MCG: 50 INJECTION, SOLUTION INTRAMUSCULAR; INTRAVENOUS at 23:58

## 2021-03-08 RX ADMIN — SODIUM CHLORIDE 15 MG/HR: 9 INJECTION, SOLUTION INTRAVENOUS at 20:36

## 2021-03-08 RX ADMIN — SODIUM CHLORIDE 15 MG/HR: 9 INJECTION, SOLUTION INTRAVENOUS at 06:46

## 2021-03-08 RX ADMIN — SODIUM CHLORIDE 15 MG/HR: 9 INJECTION, SOLUTION INTRAVENOUS at 00:35

## 2021-03-08 RX ADMIN — FENTANYL CITRATE 100 MCG: 50 INJECTION, SOLUTION INTRAMUSCULAR; INTRAVENOUS at 02:13

## 2021-03-08 RX ADMIN — PROPOFOL 35 MCG/KG/MIN: 10 INJECTION, EMULSION INTRAVENOUS at 14:15

## 2021-03-08 RX ADMIN — SODIUM CHLORIDE 15 MG/HR: 9 INJECTION, SOLUTION INTRAVENOUS at 10:47

## 2021-03-08 RX ADMIN — PROPOFOL 50 MCG/KG/MIN: 10 INJECTION, EMULSION INTRAVENOUS at 08:58

## 2021-03-08 RX ADMIN — ATORVASTATIN CALCIUM 80 MG: 80 TABLET, FILM COATED ORAL at 20:50

## 2021-03-08 RX ADMIN — ENALAPRILAT 0.62 MG: 2.5 INJECTION INTRAVENOUS at 22:56

## 2021-03-08 RX ADMIN — HYDRALAZINE HYDROCHLORIDE 20 MG: 20 INJECTION, SOLUTION INTRAMUSCULAR; INTRAVENOUS at 09:12

## 2021-03-08 RX ADMIN — FENTANYL CITRATE 100 MCG: 50 INJECTION, SOLUTION INTRAMUSCULAR; INTRAVENOUS at 15:07

## 2021-03-08 RX ADMIN — FENTANYL CITRATE 100 MCG: 50 INJECTION, SOLUTION INTRAMUSCULAR; INTRAVENOUS at 00:13

## 2021-03-08 RX ADMIN — TICAGRELOR 180 MG: 90 TABLET ORAL at 10:13

## 2021-03-08 RX ADMIN — TAZOBACTAM SODIUM AND PIPERACILLIN SODIUM 3.38 G: 375; 3 INJECTION, SOLUTION INTRAVENOUS at 14:12

## 2021-03-08 RX ADMIN — FENTANYL CITRATE 100 MCG: 50 INJECTION, SOLUTION INTRAMUSCULAR; INTRAVENOUS at 19:42

## 2021-03-08 RX ADMIN — PROPOFOL 50 MCG/KG/MIN: 10 INJECTION, EMULSION INTRAVENOUS at 03:14

## 2021-03-08 RX ADMIN — FENTANYL CITRATE 100 MCG: 50 INJECTION, SOLUTION INTRAMUSCULAR; INTRAVENOUS at 22:57

## 2021-03-08 RX ADMIN — LABETALOL HYDROCHLORIDE 20 MG: 5 INJECTION, SOLUTION INTRAVENOUS at 16:40

## 2021-03-08 RX ADMIN — HEPARIN SODIUM 12 UNITS/KG/HR: 10000 INJECTION, SOLUTION INTRAVENOUS at 10:08

## 2021-03-08 RX ADMIN — SODIUM CHLORIDE 15 MG/HR: 9 INJECTION, SOLUTION INTRAVENOUS at 12:31

## 2021-03-08 RX ADMIN — SODIUM CHLORIDE 15 MG/HR: 9 INJECTION, SOLUTION INTRAVENOUS at 02:29

## 2021-03-08 RX ADMIN — PROPOFOL 50 MCG/KG/MIN: 10 INJECTION, EMULSION INTRAVENOUS at 11:15

## 2021-03-08 RX ADMIN — PROPOFOL 30 MCG/KG/MIN: 10 INJECTION, EMULSION INTRAVENOUS at 18:38

## 2021-03-08 RX ADMIN — TICAGRELOR 90 MG: 90 TABLET ORAL at 20:36

## 2021-03-08 RX ADMIN — SODIUM CHLORIDE 12.5 MG/HR: 9 INJECTION, SOLUTION INTRAVENOUS at 16:32

## 2021-03-08 RX ADMIN — PERFLUTREN 1.5 ML: 6.52 INJECTION, SUSPENSION INTRAVENOUS at 08:28

## 2021-03-08 RX ADMIN — ENALAPRILAT 0.62 MG: 2.5 INJECTION INTRAVENOUS at 02:12

## 2021-03-08 RX ADMIN — SODIUM CHLORIDE 12.5 MG/HR: 9 INJECTION, SOLUTION INTRAVENOUS at 04:36

## 2021-03-08 NOTE — PROGRESS NOTES
Neurology Note    Patient:  Jose R Dalton    YOB: 1960    REFERRING PHYSICIAN:  Deandre Rausch MD    CHIEF COMPLAINT:    stroke    HISTORY OF PRESENT ILLNESS:   The patient remains on propofol, Cardene, heparin qtt drips, SBP 140s-170s, hydralazine added. No seizures reported. CT head this am stable.    Past Medical History:  Past Medical History:   Diagnosis Date   • Elevated cholesterol    • Hypertension    • Obesity    • Seizure (CMS/HCC)    • Seizures (CMS/HCC)    • Stroke (CMS/HCC)        Past Surgical History:  Past Surgical History:   Procedure Laterality Date   • CAROTID ENDARTERECTOMY Right    • CAROTID ENDARTERECTOMY Right        Social History:   Social History     Socioeconomic History   • Marital status: Single     Spouse name: Not on file   • Number of children: Not on file   • Years of education: Not on file   • Highest education level: Not on file   Tobacco Use   • Smoking status: Current Every Day Smoker     Packs/day: 0.50   • Smokeless tobacco: Never Used   Substance and Sexual Activity   • Alcohol use: No   • Drug use: Yes     Types: Cocaine(coke), Marijuana     Comment: positive drug screen in 2016, current possession of marijuana 3/7/21   • Sexual activity: Defer        Family History:   History reviewed. No pertinent family history.    Medications Prior to Admission:    Prior to Admission medications    Medication Sig Start Date End Date Taking? Authorizing Provider   aspirin 325 MG tablet Take 325 mg by mouth daily.    Provider, MD David   hydrOXYzine (ATARAX) 25 MG tablet Take 1 tablet by mouth every 6 (six) hours as needed for itching. 7/29/16   Fly Appiah MD   MethylPREDNISolone (MEDROL, WAYNE,) 4 MG tablet Take as directed on package instructions. 7/29/16   Fly Appiah MD       Allergies:  Patient has no known allergies.      Review of system  Review of Systems   Unable to perform ROS: Intubated       Vitals:    03/08/21 1004   BP:    Pulse: 97    Resp: 27   Temp:    SpO2: 95%       Physical exam  Physical Exam  Constitutional:       Appearance: Normal appearance.      Interventions: He is intubated.   HENT:      Head: Normocephalic and atraumatic.   Cardiovascular:      Rate and Rhythm: Regular rhythm. Tachycardia present.   Pulmonary:      Effort: He is intubated.   Neurological:      Comments: Sedated, no response to voice, touch, noxious stimuli, tone flaccid, pupils pinpoint, plantar responses neutral.           Lab Results   Component Value Date    WBC 17.64 (H) 03/08/2021    HGB 12.5 (L) 03/08/2021    HCT 37.9 03/08/2021    MCV 80.6 03/08/2021     03/08/2021     Lab Results   Component Value Date    GLUCOSE 143 (H) 03/08/2021    BUN 16 03/08/2021    CREATININE 1.40 (H) 03/08/2021    EGFRIFAFRI 63 03/08/2021    BCR 11.4 03/08/2021    CO2 15.9 (L) 03/08/2021    CALCIUM 7.8 (L) 03/08/2021    ALBUMIN 3.60 03/08/2021    AST 12 03/08/2021    ALT 7 03/08/2021     Hemoglobin A1C   4.80 - 5.60 % 5.90High       Urine Drug Screen - Urine, Clean Catch  Order: [id number]  Status:  Final result   Visible to patient:  No (not released) Next appt:  None  Specimen Information: Urine, Clean Catch        Component   Ref Range & Units 1 d ago   (3/7/21) 5 yr ago   (2/24/16) 5 yr ago   (1/24/16)   Amphet/Methamphet, Screen   Negative Negative  Negative  Negative    Barbiturates Screen, Urine   Negative Negative  Negative  Negative    Benzodiazepine Screen, Urine   Negative Negative  Negative  Negative    Cocaine Screen, Urine   Negative Negative  PositiveAbnormal   Negative    Opiate Screen   Negative Negative  Negative  Negative    THC, Screen, Urine   Negative PositiveAbnormal       Methadone Screen, Urine   Negative Negative  Negative  Negative    Oxycodone Screen, Urine   Negative Negative      Resulting Agency  [facility]             Ref Range & Units    COVID19   Not Detected - Ref. Range Not Detected    Resulting Agency [facility]       Duplex scan of  extracranial arteries using B-mode, color flow, and/or spectral Doppler; limited  Order# [id number]  Reading physician: Gina Gonzalez Jr., MD Ordering physician: Allie Montes APRN Study date: 3/8/21   Patient Information    Patient Name   Jose R Dalton MRN   [id number] Legal Sex   Male  (Age)   1960 (60 y.o.)   Clinical Indication    stenosis / occlusion; Carotid; left   Comments: EVALUATE PATENCY OF STENT   Admission Information    Admission Date/Time Discharge Date/Time Room/Bed   21  10:36 AM  I375/1   Interpretation Summary    · Left carotid stent imaging indicates mild (20-49%) restenosis.        ECG 12 Lead  Order: [id number]  Status:  Final result   Visible to patient:  No (not released) Next appt:  None  Component   Ref Range & Units 3/7/21 1116   QT Interval   ms 397       Narrative & Impression    HEART RATE= 71  bpm  RR Interval= 844  ms  KS Interval= 161  ms  P Horizontal Axis= 19  deg  P Front Axis= 65  deg  QRSD Interval= 104  ms  QT Interval= 397  ms  QRS Axis= 27  deg  T Wave Axis= 49  deg  - OTHERWISE NORMAL ECG -  Sinus rhythm  Minimal ST elevation, anterior leads  Biatrial enlargement  Borderline intraventricular conduction delay  NO SIGNIFICANT CHANGE FROM PREVIOUS ECG  Electronically Signed By: Trace Titus ([facility]) 07-Mar-2021 13:01:14  Date and Time of Study: 2021 11:16:54      Specimen Collected: 21 11:16               Radiological Studies:     CT Head Without Contrast    Result Date: 3/8/2021  CT HEAD WITHOUT CONTRAST  HISTORY: Stroke, follow-up.  COMPARISON: Comparison is made to multiple prior CT examinations of the brain with most recent examination being the CT examination performed on 2021.  FINDINGS: An area of encephalomalacia involving the frontoparietal region on the right laterally, extending to and involving the parietooccipital region is appreciated, consistent with a remote right MCA infarct. There is volume loss with secondary  enlargement of the right lateral ventricle. There is no evidence of intracranial hemorrhage. No focal area of decreased attenuation to suggest acute infarction is identified.      No evidence of acute infarction or hemorrhage. Remote right MCA infarct. Further evaluation could be performed with a MRI examination of brain as indicated.    Radiation dose reduction techniques were utilized, including automated exposure control and exposure modulation based on body size.       CT Head Without Contrast    Result Date: 3/7/2021  Patient: YOHAN NOWAK  Time Out: 18:17 Exam(s): CT HEAD Without Contrast EXAM:   CT Head Without Intravenous Contrast CLINICAL HISTORY:    Reason for exam: Stroke, follow up. TECHNIQUE:   Axial computed tomography images of the head brain without intravenous contrast.  CTDI is 54.8 mGy and DLP is 1040.20 mGy-cm.  This CT exam was performed according to the principle of ALARA (As Low As Reasonably Achievable) by using one or more of the following dose reduction techniques: automated exposure control, adjustment of the mA and or kV according to patient size, and or use of iterative reconstruction technique. COMPARISON:   CT head on 3 7 2021 at 1042 hrs. FINDINGS:   Brain: No acute infarct or hemorrhage identified. No extra-axial fluid collection. No mass effect or midline shift. Scattered areas of hypoattenuation in the supratentorial white matter likely represent chronic small vessel ischemic changes.  Stable encephalomalacia in the right MCA distribution.  Stable small remote lacunar infarct in the right caudate head.   Ventricles and sulci: Prominence of the ventricles and sulci is likely secondary to cerebral volume loss.  Ex vacuo dilatation of the right lateral ventricle.   Bones:  Normal. No bony lesion or acute fracture.   Subcutaneous tissues: Normal.   Sinuses: Mild mucosal thickening in the maxillary sinuses, sphenoid sinuses, and ethmoid air cells.   Mastoid air cells: Normal.   Orbits:  Grossly unremarkable.   Other: Atherosclerotic calcifications in the intracranial vasculature.  Nasogastric tube partially visualized.  Fluid in the nasal cavity and nasopharynx IMPRESSION:   1.  No acute intracranial abnormality. 2.  Chronic small vessel ischemic changes and cerebral volume loss.  Stable encephalomalacia in the right MCA distribution.  Stable small remote lacunar infarct in the right caudate head.     Electronically signed by Ulises Quiñonez M.D. on 03-07-21 at 1817    CT Angiogram Neck    Result Date: 3/7/2021  CT ANGIOGRAPHY OF THE HEAD AND NECK WITHOUT AND WITH INTRAVENOUS CONTRAST AND 3-D RECONSTRUCTIONS AND COLOR CT PERFUSION IMAGING OF THE BRAIN WITH INTRAVENOUS CONTRAST  HISTORY: Recent onset of severe right-sided weakness. Aphasia. Team D evaluation.  TECHNIQUE: The CT scan was performed as an emergency procedure through the head and neck with CT angiography protocol without and with intravenous contrast and 3-D reconstructions followed by color CT perfusion imaging of the brain with contrast.  FINDINGS: The following findings are present: 1. An initial noncontrast CT scan of the brain was performed. There is a large area of old infarction in the right temporoparietal region similar to the study of 02/24/2016. There is no evidence of intracranial hemorrhage or mass effect. The findings of the noncontrast CT scan were communicated to the team D physician when imaging made available at 10:50 AM. The postcontrast findings were communicated when imaging made available at 11:06 AM. 2. Evaluation for stenosis is based on NASCET criteria. The vertebral arteries are patent with slight dominance of the right vertebral artery. The left vertebral artery is quite small in caliber and filling of the basilar artery is predominantly via the right vertebral artery. The right posterior communicating artery is patent. 3. The right cervical carotid artery shows no stenosis. The left cervical carotid artery  shows complete occlusion just beyond the origin of the left internal carotid artery and I suspect this represents acute occlusion. There is some faint opacification of the internal carotid artery along the carotid canal at the base of skull. 4. The intracranial CT angiography shows some decreased opacification of the left carotid siphon compared to the right. The anterior communicating artery is patent and the right posterior communicating artery is patent and therefore much of the left-sided circulation is supplied from the right. No intracranial stenosis or thrombosis is identified. There is no evidence of aneurysm. 5. The color CT perfusion imaging shows no abnormal cerebral blood flow less than 30% by volume. However there is a large area of Tmax greater than 6 seconds by volume located in the left MCA territory. This has a volume of 55 mL.      Radiation dose reduction techniques were utilized, including automated exposure control and exposure modulation based on body size.  This report was finalized on 3/7/2021 1:16 PM by Dr. Macho Clifford M.D.      CT Chest Without Contrast Diagnostic    Result Date: 3/7/2021  Patient: YOHAN NOWAK  Time Out: 18:13 Exam(s): CT CHEST Without Contrast EXAM:   CT Chest Without Intravenous Contrast CLINICAL HISTORY:    Reason for exam: ?mass abnormal cxr. TECHNIQUE:   Axial computed tomography images of the chest without intravenous contrast.  CTDI is 23.6 mGy and DLP is 897.20 mGy-cm.  This CT exam was performed according to the principle of ALARA (As Low As Reasonably Achievable) by using one or more of the following dose reduction techniques: automated exposure control, adjustment of the mA and or kV according to patient size, and or use of iterative reconstruction technique. COMPARISON:   None FINDINGS:   Lungs:  Dependent consolidations may represent combination of atelectasis, pneumonia, and edema.  Groundglass opacities bilaterally may represent edema and or pneumonia.   Punctate calcified granuloma in the left lower lobe.  Mild paraseptal emphysematous changes of the right.   Pleural space:  Small bilateral pleural effusions.  No pneumothorax.   Heart:  Unremarkable.  No cardiomegaly.  No significant pericardial effusion.   Mediastinum:  Nonspecific mildly prominent mediastinal lymph nodes.   Bones joints:  Mild degenerative changes of the spine.  No acute fracture.  No dislocation.   Soft tissues:  Unremarkable.   Vasculature:  Unremarkable.  No thoracic aortic aneurysm.   Lymph nodes:  Unremarkable.  No enlarged lymph nodes.   Kidneys and ureters:  Left renal cyst partially visualized.  Probable excreting contrast in the renal collecting systems.   Tubes, lines and devices:  Enteric tube terminates in the stomach.  Endotracheal tube terminates in the mid thoracic trachea. IMPRESSION:     1.  Small bilateral pleural effusions. 2.  Dependent consolidations may represent combination of atelectasis, pneumonia, and edema.  Groundglass opacities bilaterally may represent edema and or pneumonia.     Electronically signed by Ulises Quiñonez M.D. on 03-07-21 at 1813    XR Chest 1 View    Result Date: 3/7/2021  XR CHEST 1 VW-  3/7/2021  HISTORY: Acute stroke protocol.  The heart size is at the upper limits of normal. There are some bilateral interstitial disease more severe on the right than on the left with more confluent increased density in the right lung base. There is masslike area of increased soft tissue along the left paramediastinal region. This is not seen on the previous study of 1/26/2016 and could represent atelectasis, dense consolidation or a mass.  Endotracheal tube is seen in good position with its tip overlying the trachea at the level of the aortic arch.  No pneumothorax is seen.      Borderline cardiomegaly. 2. Interstitial disease mostly on the right with more confluent increased density in the right lung base. FINDINGS could represent asymmetric pulmonary edema and/or  pneumonia. 3. Masslike opacity in the left paramediastinal region could be related to atelectasis, dense pneumonia and/or mass. 4. Endotracheal tube tip overlies the trachea. 5. Please correlate with the clinical findings. Short-term follow-up chest radiograph and/or follow-up CT of the chest recommended.  This report was finalized on 3/7/2021 12:02 PM by Dr. Jay Jay Savage M.D.      Duplex Carotid - Left Ultrasound CAR    Result Date: 3/8/2021  · Left carotid stent imaging indicates mild (20-49%) restenosis.      CT Angiogram Head    Result Date: 3/7/2021  CT ANGIOGRAPHY OF THE HEAD AND NECK WITHOUT AND WITH INTRAVENOUS CONTRAST AND 3-D RECONSTRUCTIONS AND COLOR CT PERFUSION IMAGING OF THE BRAIN WITH INTRAVENOUS CONTRAST  HISTORY: Recent onset of severe right-sided weakness. Aphasia. Team D evaluation.  TECHNIQUE: The CT scan was performed as an emergency procedure through the head and neck with CT angiography protocol without and with intravenous contrast and 3-D reconstructions followed by color CT perfusion imaging of the brain with contrast.  FINDINGS: The following findings are present: 1. An initial noncontrast CT scan of the brain was performed. There is a large area of old infarction in the right temporoparietal region similar to the study of 02/24/2016. There is no evidence of intracranial hemorrhage or mass effect. The findings of the noncontrast CT scan were communicated to the team D physician when imaging made available at 10:50 AM. The postcontrast findings were communicated when imaging made available at 11:06 AM. 2. Evaluation for stenosis is based on NASCET criteria. The vertebral arteries are patent with slight dominance of the right vertebral artery. The left vertebral artery is quite small in caliber and filling of the basilar artery is predominantly via the right vertebral artery. The right posterior communicating artery is patent. 3. The right cervical carotid artery shows no stenosis. The  left cervical carotid artery shows complete occlusion just beyond the origin of the left internal carotid artery and I suspect this represents acute occlusion. There is some faint opacification of the internal carotid artery along the carotid canal at the base of skull. 4. The intracranial CT angiography shows some decreased opacification of the left carotid siphon compared to the right. The anterior communicating artery is patent and the right posterior communicating artery is patent and therefore much of the left-sided circulation is supplied from the right. No intracranial stenosis or thrombosis is identified. There is no evidence of aneurysm. 5. The color CT perfusion imaging shows no abnormal cerebral blood flow less than 30% by volume. However there is a large area of Tmax greater than 6 seconds by volume located in the left MCA territory. This has a volume of 55 mL.      Radiation dose reduction techniques were utilized, including automated exposure control and exposure modulation based on body size.  This report was finalized on 3/7/2021 1:16 PM by Dr. Macho Clifford M.D.      XR Abdomen KUB    Result Date: 3/7/2021  Patient: YOHAN NOWAK  Time Out: 19:25 Exam(s): FILM ABDOMEN EXAM:   XR Abdomen, 2 Views CLINICAL HISTORY:    Reason for exam: cortrak placement. TECHNIQUE:   Frontal view of the abdomen pelvis with upright view of the abdomen. COMPARISON:   None FINDINGS:   Hardware: An enteric tube terminates in the region of the stomach.   Abdomen: Nonobstructive partially visualized bowel gas pattern. No free air.   Bones: Normal.   Soft tissues: Normal.   Lower chest: Normal.   Other: Excreting contrast in the renal collecting systems. IMPRESSION:     An enteric tube terminates in the region of the stomach.     Electronically signed by Ulises Quiñonez M.D. on 03-07-21 at 1925    CT CEREBRAL PERFUSION W WO CONTRAST W AI ANALYSIS OF LVO    Result Date: 3/7/2021  CT ANGIOGRAPHY OF THE HEAD AND NECK WITHOUT AND  WITH INTRAVENOUS CONTRAST AND 3-D RECONSTRUCTIONS AND COLOR CT PERFUSION IMAGING OF THE BRAIN WITH INTRAVENOUS CONTRAST  HISTORY: Recent onset of severe right-sided weakness. Aphasia. Team D evaluation.  TECHNIQUE: The CT scan was performed as an emergency procedure through the head and neck with CT angiography protocol without and with intravenous contrast and 3-D reconstructions followed by color CT perfusion imaging of the brain with contrast.  FINDINGS: The following findings are present: 1. An initial noncontrast CT scan of the brain was performed. There is a large area of old infarction in the right temporoparietal region similar to the study of 02/24/2016. There is no evidence of intracranial hemorrhage or mass effect. The findings of the noncontrast CT scan were communicated to the team D physician when imaging made available at 10:50 AM. The postcontrast findings were communicated when imaging made available at 11:06 AM. 2. Evaluation for stenosis is based on NASCET criteria. The vertebral arteries are patent with slight dominance of the right vertebral artery. The left vertebral artery is quite small in caliber and filling of the basilar artery is predominantly via the right vertebral artery. The right posterior communicating artery is patent. 3. The right cervical carotid artery shows no stenosis. The left cervical carotid artery shows complete occlusion just beyond the origin of the left internal carotid artery and I suspect this represents acute occlusion. There is some faint opacification of the internal carotid artery along the carotid canal at the base of skull. 4. The intracranial CT angiography shows some decreased opacification of the left carotid siphon compared to the right. The anterior communicating artery is patent and the right posterior communicating artery is patent and therefore much of the left-sided circulation is supplied from the right. No intracranial stenosis or thrombosis is  identified. There is no evidence of aneurysm. 5. The color CT perfusion imaging shows no abnormal cerebral blood flow less than 30% by volume. However there is a large area of Tmax greater than 6 seconds by volume located in the left MCA territory. This has a volume of 55 mL.      Radiation dose reduction techniques were utilized, including automated exposure control and exposure modulation based on body size.  This report was finalized on 3/7/2021 1:16 PM by Dr. Macho Clifford M.D.      Arteriogram (Autofinalize)    Result Date: 3/7/2021  This procedure was auto-finalized with no dictation required.      During this visit the following were done:  Labs Reviewed [x]    Labs Ordered []    Radiology Reports Reviewed [x]    Radiology Ordered []    EKG, echo, and/or stress test reviewed [x]    EEG results reviewed  []    EEG reviewed and interpreted per myself   []    Discussed case with neurointerventionalist or neuroradiologist []    Referring Provider Records Reviewed []    ER Records Reviewed []    Hospital Records Reviewed []    History Obtained From Family []    Radiological images view and Interpreted per myself [x]    Case Discussed with referring provider []     Decision to obtain and request outside records  []        Assessment and Plan     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 Sedated exam. On propofol, heparin qtt and Cardene. Sedated exam. CT head this am stable, old large RMCA stroke, no LMCA stroke noted.   - ICU care.   - Wean sedation and vent as tolerated.   - CT head vs MRI at noon.   - Start DAPT if no ICH.   - high dose statin   - TTE.   - ST, PT, OT.              Electronically signed by Tony Fraser MD on 3/8/2021 at 11:02 EST

## 2021-03-08 NOTE — SIGNIFICANT NOTE
03/08/21 1328   OTHER   Discipline occupational therapist   Rehab Time/Intention   Session Not Performed   (ordered stroke protocol.  noted pt intubated.  discuss with RN who agrees with sign off for OT at this time.  Please reorder if/when status improves and pt able to participate in OT mark.)   Recommendation   OT - Next Appointment 03/09/21

## 2021-03-08 NOTE — PROGRESS NOTES
[facility] NEUROSURGERY PROGRESS NOTE    PATIENT IDENTIFICATION:   Name:  Jose R Dalton      MRN:  [id number]     60 y.o.  male               CC: POD 1 left ICA thrombectomy, angioplasty, stent placement      Subjective     Interval History: Received aspirin and Plavix load last night. CTH this AM.  P2Y 12 nontherapeutic.  Minimal neurologic response per nursing but is on high-dose propofol and maxed out Cardene to keep SBP less than 150.    ROS:  Unable to assess due to intubation, sedation    Objective     Vital signs in last 24 hours:  Temp:  [97.5 °F (36.4 °C)-99.8 °F (37.7 °C)] 99.8 °F (37.7 °C)  Heart Rate:  [] 102  Resp:  [20-30] 27  BP: (107-186)/(46-84) 144/58  Arterial Line BP: (106-172)/(42-79) 137/53  FiO2 (%):  [25 %-100 %] 25 %      Intake/Output this shift:  No intake/output data recorded.      Intake/Output last 3 shifts:  I/O last 3 completed shifts:  In: 3811 [I.V.:3611; IV Piggyback:200]  Out: 950 [Urine:850; Blood:100]    LABS:  Results from last 7 days   Lab Units 03/08/21  1001 03/08/21  0317 03/07/21  1105   WBC 10*3/mm3 17.64* 13.38* 5.94   HEMOGLOBIN g/dL 12.5* 12.2* 11.6*   HEMATOCRIT % 37.9 37.8 35.3*   PLATELETS 10*3/mm3 212 222 190     Results from last 7 days   Lab Units 03/08/21  0317 03/07/21  1106   SODIUM mmol/L 139 141   POTASSIUM mmol/L 4.4 4.0   CHLORIDE mmol/L 109* 107   CO2 mmol/L 15.9* 25.9   BUN mg/dL 16 12   CREATININE mg/dL 1.40* 0.99   CALCIUM mg/dL 7.8* 8.2*   BILIRUBIN mg/dL 0.3 0.3   ALK PHOS U/L 46 47   ALT (SGPT) U/L 7 9   AST (SGOT) U/L 12 12   GLUCOSE mg/dL 143* 107*     Results from last 7 days   Lab Units 03/08/21  1001   INR  1.35*     Lab Results   Lab Value Date/Time    PTT 33.6 03/08/2021 1001    PTT 33.7 03/07/2021 1105     Results from last 7 days   Lab Units 03/08/21  0317   HEMOGLOBIN A1C % 5.90*     Results from last 7 days   Lab Units 03/08/21  0317   CHOLESTEROL mg/dL 227*   TRIGLYCERIDES mg/dL 143   HDL CHOL mg/dL 54   LDL CHOL mg/dL 147*       P2Y12 3/8 0317 = 303    IMAGING STUDIES:  CT head 3/8  0400-chronic right MCA distribution infarct with ex vacuo from a right lateral ventricle.  No evidence of hemorrhage or acute infarct    I personally viewed and interpreted the patient's CT head.  Also reviewed by discussed with Dr. Washington..    Meds reviewed/changed: Yes    Current Facility-Administered Medications:   •  [START ON 3/9/2021] aspirin chewable tablet 81 mg, 81 mg, Oral, Daily, Allie Montes APRN  •  atorvastatin (LIPITOR) tablet 80 mg, 80 mg, Oral, Nightly, Nick Washington MD, 80 mg at 03/07/21 2126  •  enalaprilat (VASOTEC) injection 0.625 mg, 0.625 mg, Intravenous, Q6H PRN, Nick Washington MD, 0.625 mg at 03/08/21 0924  •  fentaNYL citrate (PF) (SUBLIMAZE) injection 100 mcg, 100 mcg, Intravenous, Q1H PRN, Nick Washington MD, 100 mcg at 03/08/21 1103  •  fentaNYL citrate (SUBLIMAZE) 1250 mcg in sodium chloride 0.9% 250 mL infusion, 75 mcg/hr, Intravenous, Continuous, Nick Washington MD  •  heparin 12249 units/250 mL (100 units/mL) in 0.45 % NaCl infusion, 12 Units/kg/hr, Intravenous, Titrated, Allie Montes APRN, Last Rate: 14.04 mL/hr at 03/08/21 1008, 12 Units/kg/hr at 03/08/21 1008  •  hydrALAZINE (APRESOLINE) injection 20 mg, 20 mg, Intravenous, Q4H PRN, Ever Longoria MD, 20 mg at 03/08/21 0912  •  labetalol (NORMODYNE,TRANDATE) injection 20 mg, 20 mg, Intravenous, Q10 Min PRN, Nick Washington MD, 20 mg at 03/07/21 2046  •  metoprolol tartrate (LOPRESSOR) injection 5 mg, 5 mg, Intravenous, Q6H PRN, Ai Spangler MD, 5 mg at 03/08/21 0642  •  niCARdipine (CARDENE) 50 mg in sodium chloride 0.9 % 250 mL IVPB, 5-15 mg/hr, Intravenous, Titrated, Deandre Rausch MD, Last Rate: 75 mL/hr at 03/08/21 1231, 15 mg/hr at 03/08/21 1231  •  piperacillin-tazobactam (ZOSYN) 3.375 g in iso-osmotic dextrose 50 ml (premix), 3.375 g, Intravenous, Q8H, Nick Washington MD, 3.375 g at 03/08/21 0641  •  propofol  (DIPRIVAN) infusion 10 mg/mL 100 mL, 5-50 mcg/kg/min, Intravenous, Titrated, Deandre Rausch MD, Last Rate: 34.3 mL/hr at 03/08/21 1240, 45 mcg/kg/min at 03/08/21 1240  •  sodium chloride 0.9 % flush 10 mL, 10 mL, Intravenous, PRN, Nick Washington MD  •  ticagrelor (BRILINTA) tablet 90 mg, 90 mg, Oral, BID, Allie Montes, APRN      Physical Exam:    General:   Intubated and sedated.  Does not open eyes to stimulus.  Positive cough and gag.  Patient overbreathing vent.  HEENT:    Orally intubated.  Nasogastric feeding tube in place  Neck:    Carotid pulses 2+.  Carotid upstrokes auscultated.  No bruit    CN III IV VI: Pupils midpoint and pinpoint.  No movement of the oculocephalic maneuver  CN VII: No obvious facial asymmetry, but equipment limits exam    Motor: No response to painful stimuli  Sensation: No response to pain  Station and Gait: Unable to assess due to intubation and sedation  Coordination: Unable to assess as patient does not follow commands  Extremities:   Wearing SCD.  Right groin site with clean dry intact dressing.  Distal pulses 2+.    Assessment/Plan     ASSESSMENT:      Cerebrovascular accident (CVA) (CMS/HCC)    Patient nonresponsive but heavily sedated and on vent due to difficulties maintaining blood pressure parameters with SBP less than 150.  Also remains on max dose Cardene with as needed hydralazine and Vasotec given per nursing.  CT head this morning shows no evidence of acute infarct or hemorrhage.  Patient received 600 mg load Plavix and aspirin 325 yesterday evening but P2Y12 shows nontherapeutic this morning.  Dr. Washington concerned about stent patency.  Stat carotid Doppler and repeat P2Y12 ordered, as well as heparin WBP low-dose no boluses.  If P2Y12 less than 190, will stop heparin drip and continue with Plavix dosing.  If P2Y12 greater than 190, will load with Brilinta 180 mg x 1 and then begin 90 mg twice daily and reduce aspirin to 81 mg with repeat P2Y12 (4) hours  "after dosing.  Will DC heparin weight-based protocol once  therapeutic P2Y12.  Needs MRI once stable.  Okay to wean propofol as tolerated.  Asked nursing to discuss oral antihypertensive options with intensivist.    PLAN:   Stat P2Y12    Start heparin WBP no bolus low-dose.    Stat Doppler carotid.    MRI brain when stable.  Keep SBP less than 150    I discussed the patient's findings and my recommendations with patient, nursing staff and Dr. Washington and Dr. Fraser    Addendum: Carotid Doppler shows mild (20-49%) stenosis of left ICA.  P2Y12= 261.  Reduced aspirin 81 mg and loaded 180 mg Brilinta with orders for repeat P2Y12 4 hours with goal .  Repeat CT head at noon shows no evidence of hemorrhage.  Discussed with Dr. Washington     LOS: 1 day       Allie Montes, APRN  3/8/2021  12:50 EST    \"Dictated utilizing Dragon dictation\".      "

## 2021-03-08 NOTE — SIGNIFICANT NOTE
03/08/21 1113   OTHER   Discipline speech language pathologist   Rehab Time/Intention   Session Not Performed other (see comments)  (Discussed with RN. Pt currently intubated. ST will s/o at this time. Please reconsult when pt is appropriate.)

## 2021-03-08 NOTE — CONSULTS
Acute rehab referral received via stroke order set. Patient status noted. Also, [facility] does not provide any [facility] re[facility] benefit. Will sign off.    Thank you.    Ok Bolanos RN  p

## 2021-03-08 NOTE — CONSULTS
"Adult Nutrition  Assessment/PES    Patient Name:  Jose R Dalton  YOB: 1960  MRN: [id number]  Admit Date:  3/7/2021    Assessment Date:  3/8/2021    Comments:  Nutrition Consult.  Pt S/P CVA, received TPN and had thrombectomy, stenting. Also has aspiration PNA and sedated on the vent with propofol. RN trying to wean it down. Has an NG Cortrak in place. Would lbegin tube feed with Peptamen AF - goal at this time is 55cc/hr, minimal free water to start.  Will follow clinical course and adjust TF as needed.     Reason for Assessment     Row Name 03/08/21 1104          Reason for Assessment    Reason For Assessment  TF/PN;physician consult     Diagnosis  neurologic conditions;pulmonary disease CVA - TPA thrombectomy and stenting; Asp PNA - on vent         Nutrition/Diet History     Row Name 03/08/21 1109          Nutrition/Diet History    Factors Affecting Nutritional Intake  difficulty/impaired swallowing;impaired cognitive status/motor control;compromised airway         Anthropometrics     Row Name 03/08/21 1109 03/08/21 0823       Anthropometrics    Height  185.4 cm (73\")  185.4 cm (73\")    Weight  --  117 kg (257 lb)       Admit Weight    Admit Weight  117 kg (257 lb 4.4 oz)  --       Ideal Body Weight (IBW)    Ideal Body Weight (IBW) (kg)  84.86  84.86    % Ideal Body Weight  --  137.37    % of Ideal Body Weight Assessment  -- 137% IBW  --       Body Mass Index (BMI)    BMI (kg/m2)  --  33.98    BMI Assessment  BMI 30-34.9: obesity grade I BMI 33.9  --        Labs/Tests/Procedures/Meds     Row Name 03/08/21 1109          Labs/Procedures/Meds    Lab Results Reviewed  reviewed, pertinent     Lab Results Comments  Glu, Cr, wbc,        Diagnostic Tests/Procedures    Diagnostic Test/Procedure Reviewed  reviewed, pertinent     Diagnostic Test/Procedures Comments  KUB        Medications    Pertinent Medications Reviewed  reviewed, pertinent     Pertinent Medications Comments  asa, lipitor, zosyn, brilinta, " "fent, heparin, cardene, propofol         Physical Findings     Row Name 03/08/21 1111          Physical Findings    Overall Physical Appearance  obese;on ventilator support     Gastrointestinal  feeding tube     Tubes  nasogastric tube     Skin  other (see comments) B=12         Estimated/Assessed Needs     Row Name 03/08/21 1115 03/08/21 1109       Calculation Measurements    Weight Used For Calculations  117 kg (257 lb 15 oz)  --    Height  --  185.4 cm (73\")       Estimated/Assessed Needs    Additional Documentation  KCAL/KG (Group);Protein Requirements (Group);Fluid Requirements (Group)  --       KCAL/KG    KCAL/KG  20 Kcal/Kg (kcal);15 Kcal/Kg (kcal)  --    15 Kcal/Kg (kcal)  1755  --    20 Kcal/Kg (kcal)  2340  --       Protein Requirements    Weight Used For Protein Calculations  117 kg (257 lb 15 oz)  --    Est Protein Requirement Amount (gms/kg)  1.0 gm protein  --    Estimated Protein Requirements (gms/day)  117  --       Fluid Requirements    Fluid Requirements (mL/day)  2100  --       --    Row Name 03/08/21 0823          Calculation Measurements    Height  185.4 cm (73\")         Nutrition Prescription Ordered     Row Name 03/08/21 1116          Nutrition Prescription PO    Current PO Diet  NPO        Nutrition Prescription EN    Enteral Route  NG        Propofol Considerations    Propofol (mL/hr)  26.7 mL/hr     Propofol (Kcal/day)  704.88 Kcal/day         Problem/Interventions:  Problem 1     Row Name 03/08/21 1128          Nutrition Diagnoses Problem 1    Problem 1  Needs Alternate Route     Etiology (related to)  Medical Diagnosis     Neurological  CVA     Pulmonary/Critical Care  Acute respiratory failure;Ventilator;Pneumonia         Intervention Goal     Row Name 03/08/21 1129          Intervention Goal    General  Maintain nutrition;Nutrition support treatment;Reduce/improve symptoms;Meet nutritional needs for age/condition;Disease management/therapy     TF/PN  Tolerate TF at goal;Inititiate TF/PN "     Transition  TF to PO     Weight  No significant weight loss         Nutrition Intervention     Row Name 03/08/21 1130          Nutrition Intervention    RD/Tech Action  Follow Tx progress;Care plan reviewd;Recommend/ordered     Recommended/Ordered  EN         Nutrition Prescription     Row Name 03/08/21 1130          Nutrition Prescription EN    Enteral Prescription  Enteral begin/change;Enteral to supply     Enteral Route  NG     Product  Peptamen AF     TF Delivery Method  Continuous     Continuous TF Goal Rate (mL/hr)  55 mL/hr     Water flush (mL)   30 mL     Water Flush Frequency  Every 4 hours     New EN Prescription Ordered?  Yes        EN to Supply    Kcal/Day  1584 Kcal/Day     Kcal/day with Propofol   2289 Kcal/day with Propofol     Protein (gm/day)  100.32 gm/day     Meet Estimated Kcal Need (%)  100 %     Meet Estimated Protein Need (%)  85 %     TF Free H2O (mL)  1078.92 mL     Total Free H2O (mL/day)  1258 mL/day         Education/Evaluation     Row Name 03/08/21 5714          Education    Education  Will Instruct as appropriate        Monitor/Evaluation    Monitor  Per protocol;Skin status;Symptoms;I&O;Pertinent labs;TF delivery/tolerance;Weight           Electronically signed by:  Callie Sheehan RD  03/08/21 15:13 EST

## 2021-03-08 NOTE — PLAN OF CARE
Goal Outcome Evaluation:        Outcome Summary: Difficulty controlling SBP <140 per orders. PRNs and cardene gtt maxed, fent given q1h and propofol at 50. Discussed with Dr. Spangler and Dr. Washington and SBP goal adjusted to <150. Not withdrawing from pain, pupils 1 mm bilateral, secondary to sedation. CT completed this AM, results pending. Creatinine elevated this AM on labs. NPO maintained. SCDs applied. Q2 turns not consistently completed when SBP over goal. MRI form reviewed with daughter this AM, consent for contrast obtained. Dr. Washington ordered to minimize stimuli to obtain better BP control. Will have MRI and 24 hour CT today. Daughter will be designated visitor.        Problem: Adult Inpatient Plan of Care  Goal: Absence of Hospital-Acquired Illness or Injury  Intervention: Prevent Skin Injury  Recent Flowsheet Documentation  Taken 3/8/2021 0200 by Sonal Abrams, RN  Body Position:   turned   side-lying, right   lower extremity elevated, left   lower extremity elevated, right   upper extremity elevated, left   upper extremity elevated, right

## 2021-03-08 NOTE — PROGRESS NOTES
"  Daily Progress Note.   [facility] INTENSIVE CARE  3/8/2021    Patient:  Name:  Jose R Dalton  MRN:  [id number]  1960  60 y.o.  male         CC:    Interval History:  No acute events overnight he maintains on mechanical ventilation.  He is sedated at present time unable to follow any commands.  He is synchronous with mechanical ventilation    ROS: No fever, no diarrhea, no chest pain  PMFSSH: no change    Physical Exam:  /57   Pulse 105   Temp 99.8 °F (37.7 °C) (Oral)   Resp 27   Ht 185.4 cm (73\")   Wt 117 kg (257 lb)   SpO2 93%   BMI 33.91 kg/m²   Body mass index is 33.91 kg/m².    Intake/Output Summary (Last 24 hours) at 3/8/2021 1326  Last data filed at 3/8/2021 0436  Gross per 24 hour   Intake 3811 ml   Output 950 ml   Net 2861 ml   Vent Settings          Resp Rate (Set): 22     FiO2 (%): 25 %  PEEP/CPAP (cm H2O): 5 cm H20    Minute Ventilation (L/min) (Obs): 15 L/min  Resp Rate (Observed) Vent: 29  I:E Ratio (Set): 1:4.36  I:E Ratio (Obs): 1:1.40    PIP Observed (cm H2O): 15 cm H2O  Plateau Pressure (cm H2O): 0 cm H2O    General appearance: Nontoxic on mechanical ventilation unresponsive  Eyes: anicteric sclerae, moist conjunctivae;  HENT: Atraumatic; ET tube  Neck: Trachea midline; FROM, supple, no thyromegaly or lymphadenopathy  Lungs: Mechanical bilateral breath sounds coarse no wheeze, with normal respiratory effort and no intercostal retractions  CV: Distant no rub  Abdomen: Obese soft bowel sounds present  Extremities: No peripheral edema or extremity lymphadenopathy  Skin: Warm well perfused no diffuse rash  Psych/neuro sedated limiting exam.     Data Review:  Notable Labs:  Results from last 7 days   Lab Units 03/08/21  1001 03/08/21  0317 03/07/21  1105   WBC 10*3/mm3 17.64* 13.38* 5.94   HEMOGLOBIN g/dL 12.5* 12.2* 11.6*   PLATELETS 10*3/mm3 212 222 190     Results from last 7 days   Lab Units 03/08/21  0317 03/07/21  1106   SODIUM mmol/L 139 141   POTASSIUM mmol/L " 4.4 4.0   CHLORIDE mmol/L 109* 107   CO2 mmol/L 15.9* 25.9   BUN mg/dL 16 12   CREATININE mg/dL 1.40* 0.99   GLUCOSE mg/dL 143* 107*   CALCIUM mg/dL 7.8* 8.2*   Estimated Creatinine Clearance: 76.1 mL/min (A) (by C-G formula based on SCr of 1.4 mg/dL (H)).    Results from last 7 days   Lab Units 03/08/21  1001 03/08/21  0317 03/07/21  1106 03/07/21  1105   AST (SGOT) U/L  --  12 12  --    ALT (SGPT) U/L  --  7 9  --    PLATELETS 10*3/mm3 212 222  --  190       Results from last 7 days   Lab Units 03/08/21  1121 03/07/21  1620   PH, ARTERIAL pH units 7.399 7.311*   PCO2, ARTERIAL mm Hg 32.3* 46.5*   PO2 ART mm Hg 67.9* 56.2*   HCO3 ART mmol/L 19.9* 23.4       Imaging:  Reviewed chest images personally from past 3 days    Scheduled meds:    [START ON 3/9/2021] aspirin, 81 mg, Oral, Daily  atorvastatin, 80 mg, Oral, Nightly  hydrALAZINE, 50 mg, Nasogastric, Q8H  piperacillin-tazobactam, 3.375 g, Intravenous, Q8H  ticagrelor, 90 mg, Oral, BID        ASSESSMENT  /  PLAN:  Aspiration pneumonia  Acute stroke likely secondary to left carotid occlusion status post TPA and stenting  History of right MCA stroke previously  Aspiration pneumonia  Abnormal chest x-ray-question mediastinal mass or hilar mass send for CT chest  HTN  HLD  Seizure history  Smoker  H/o Polysubstance abuse  MARIO  Metabolic acidosis  Leukocytosis  Routine mgmt of [facility] ventilation     Send lactic acid  IVFs monitor renal function  abg reviewed  We will continue antibiotics   vent reviewed adjusted  ransthoracic echocardiogram extubation if okay with stroke service and neurosurgery.  We will need to lighten sedation to allow neurological examination.    Blood pressure control on Cardene.  Go ahead and add some p.o. oral options to help maintain as well.  Hydralazine 50mg TID    Total critical care time was 40 minutes, excluding any separately billable procedure time.  Time did not overlap with any other provider.    Plan of care and patient course was  reviewed with multidisciplinary team in morning rounds.      Deandre Rausch MD  [facility]  03/08/21  13:26 EST

## 2021-03-08 NOTE — NURSING NOTE
Daughter established as next of kin (Harsh Dalton). Confirmed significant other (Nuzhat) is not POA. Updated Harsh on patient condition, gave access code. Received permission to update Nuzhat and Naty Dalton (Sister) on patient condition.    Naty updated on patient condition and requested I update siblings. Discussed with Naty and Harsh need to update the rest of the family to decrease the time staff spends on phone to allow adequate care for critical patient.    Nuzhat (significant other) called unit and became aggressive with staff while receiving update on patient care. Patient called again later and demanded the access code and stated that she didn't give permission for an access code to be given. Educated that Harsh is the patient's next of kin and may distribute information/updates.

## 2021-03-08 NOTE — PLAN OF CARE
Continues on vent 25% fio2 peep 5. Nonpurposeful movement in BUE when suctioned or with vigorous stimuli. No response in BLE. Fauzia. BP increases when awakened for  neuro assessment- usually requiring PRN to bring back down to goal.  TF started. Prop at 30mcg/kg/min and cardene maxed at 15ml/H. R groin soft/ no hematoma. Voiding spontaneously.

## 2021-03-09 ENCOUNTER — APPOINTMENT (OUTPATIENT)
Dept: MRI IMAGING | Facility: HOSPITAL | Age: 61
End: 2021-03-09

## 2021-03-09 ENCOUNTER — APPOINTMENT (OUTPATIENT)
Dept: GENERAL RADIOLOGY | Facility: HOSPITAL | Age: 61
End: 2021-03-09

## 2021-03-09 ENCOUNTER — APPOINTMENT (OUTPATIENT)
Dept: CT IMAGING | Facility: HOSPITAL | Age: 61
End: 2021-03-09

## 2021-03-09 LAB
ALBUMIN SERPL-MCNC: 3.6 G/DL (ref 3.5–5.2)
ALBUMIN/GLOB SERPL: 1.4 G/DL
ALP SERPL-CCNC: 46 U/L (ref 39–117)
ALT SERPL W P-5'-P-CCNC: 8 U/L (ref 1–41)
ANION GAP SERPL CALCULATED.3IONS-SCNC: 11.5 MMOL/L (ref 5–15)
AST SERPL-CCNC: 32 U/L (ref 1–40)
BILIRUB SERPL-MCNC: 0.5 MG/DL (ref 0–1.2)
BUN SERPL-MCNC: 17 MG/DL (ref 6–20)
BUN/CREAT SERPL: 12.4 (ref 7–25)
CALCIUM SPEC-SCNC: 8.1 MG/DL (ref 8.6–10.5)
CHLORIDE SERPL-SCNC: 109 MMOL/L (ref 98–107)
CO2 SERPL-SCNC: 21.5 MMOL/L (ref 22–29)
CREAT BLDA-MCNC: 1 MG/DL (ref 0.6–1.3)
CREAT SERPL-MCNC: 1.37 MG/DL (ref 0.76–1.27)
DEPRECATED RDW RBC AUTO: 42.1 FL (ref 37–54)
ERYTHROCYTE [DISTWIDTH] IN BLOOD BY AUTOMATED COUNT: 14.4 % (ref 12.3–15.4)
GFR SERPL CREATININE-BSD FRML MDRD: 64 ML/MIN/1.73
GLOBULIN UR ELPH-MCNC: 2.6 GM/DL
GLUCOSE BLDC GLUCOMTR-MCNC: 110 MG/DL (ref 70–130)
GLUCOSE BLDC GLUCOMTR-MCNC: 121 MG/DL (ref 70–130)
GLUCOSE SERPL-MCNC: 134 MG/DL (ref 65–99)
HCT VFR BLD AUTO: 37 % (ref 37.5–51)
HGB BLD-MCNC: 12.1 G/DL (ref 13–17.7)
MCH RBC QN AUTO: 26.4 PG (ref 26.6–33)
MCHC RBC AUTO-ENTMCNC: 32.7 G/DL (ref 31.5–35.7)
MCV RBC AUTO: 80.8 FL (ref 79–97)
PA ADP PRP-ACNC: 9 PRU (ref 194–418)
PLATELET # BLD AUTO: 220 10*3/MM3 (ref 140–450)
PMV BLD AUTO: 11 FL (ref 6–12)
POTASSIUM SERPL-SCNC: 4 MMOL/L (ref 3.5–5.2)
PROT SERPL-MCNC: 6.2 G/DL (ref 6–8.5)
RBC # BLD AUTO: 4.58 10*6/MM3 (ref 4.14–5.8)
SODIUM SERPL-SCNC: 142 MMOL/L (ref 136–145)
WBC # BLD AUTO: 16.21 10*3/MM3 (ref 3.4–10.8)

## 2021-03-09 PROCEDURE — 82962 GLUCOSE BLOOD TEST: CPT

## 2021-03-09 PROCEDURE — 94799 UNLISTED PULMONARY SVC/PX: CPT

## 2021-03-09 PROCEDURE — 25010000002 FENTANYL CITRATE (PF) 100 MCG/2ML SOLUTION: Performed by: NEUROLOGICAL SURGERY

## 2021-03-09 PROCEDURE — 25010000002 PROPOFOL 10 MG/ML EMULSION: Performed by: INTERNAL MEDICINE

## 2021-03-09 PROCEDURE — 71045 X-RAY EXAM CHEST 1 VIEW: CPT

## 2021-03-09 PROCEDURE — 94003 VENT MGMT INPAT SUBQ DAY: CPT

## 2021-03-09 PROCEDURE — 25010000002 HYDRALAZINE PER 20 MG: Performed by: PSYCHIATRY & NEUROLOGY

## 2021-03-09 PROCEDURE — 99232 SBSQ HOSP IP/OBS MODERATE 35: CPT | Performed by: PSYCHIATRY & NEUROLOGY

## 2021-03-09 PROCEDURE — 25010000002 PIPERACILLIN SOD-TAZOBACTAM PER 1 G: Performed by: NEUROLOGICAL SURGERY

## 2021-03-09 PROCEDURE — 85027 COMPLETE CBC AUTOMATED: CPT | Performed by: NEUROLOGICAL SURGERY

## 2021-03-09 PROCEDURE — 80053 COMPREHEN METABOLIC PANEL: CPT | Performed by: NEUROLOGICAL SURGERY

## 2021-03-09 PROCEDURE — 70450 CT HEAD/BRAIN W/O DYE: CPT

## 2021-03-09 PROCEDURE — 70551 MRI BRAIN STEM W/O DYE: CPT

## 2021-03-09 PROCEDURE — 99024 POSTOP FOLLOW-UP VISIT: CPT | Performed by: NURSE PRACTITIONER

## 2021-03-09 PROCEDURE — 85576 BLOOD PLATELET AGGREGATION: CPT | Performed by: NURSE PRACTITIONER

## 2021-03-09 PROCEDURE — 25010000002 FENTANYL CITRATE (PF) 100 MCG/2ML SOLUTION: Performed by: INTERNAL MEDICINE

## 2021-03-09 RX ORDER — HYDRALAZINE HYDROCHLORIDE 50 MG/1
100 TABLET, FILM COATED ORAL EVERY 8 HOURS SCHEDULED
Status: DISCONTINUED | OUTPATIENT
Start: 2021-03-09 | End: 2021-03-14

## 2021-03-09 RX ORDER — CHLORHEXIDINE GLUCONATE 0.12 MG/ML
15 RINSE ORAL EVERY 12 HOURS SCHEDULED
Status: DISCONTINUED | OUTPATIENT
Start: 2021-03-09 | End: 2021-03-19 | Stop reason: HOSPADM

## 2021-03-09 RX ORDER — FENTANYL CITRATE 50 UG/ML
50 INJECTION, SOLUTION INTRAMUSCULAR; INTRAVENOUS
Status: DISCONTINUED | OUTPATIENT
Start: 2021-03-09 | End: 2021-03-12

## 2021-03-09 RX ADMIN — HYDRALAZINE HYDROCHLORIDE 20 MG: 20 INJECTION, SOLUTION INTRAMUSCULAR; INTRAVENOUS at 01:41

## 2021-03-09 RX ADMIN — ATORVASTATIN CALCIUM 80 MG: 80 TABLET, FILM COATED ORAL at 21:12

## 2021-03-09 RX ADMIN — SODIUM CHLORIDE 125 ML/HR: 9 INJECTION, SOLUTION INTRAVENOUS at 06:00

## 2021-03-09 RX ADMIN — ASPIRIN 81 MG: 81 TABLET, CHEWABLE ORAL at 09:22

## 2021-03-09 RX ADMIN — TAZOBACTAM SODIUM AND PIPERACILLIN SODIUM 3.38 G: 375; 3 INJECTION, SOLUTION INTRAVENOUS at 21:59

## 2021-03-09 RX ADMIN — PROPOFOL 35 MCG/KG/MIN: 10 INJECTION, EMULSION INTRAVENOUS at 06:47

## 2021-03-09 RX ADMIN — PROPOFOL 35 MCG/KG/MIN: 10 INJECTION, EMULSION INTRAVENOUS at 03:52

## 2021-03-09 RX ADMIN — FENTANYL CITRATE 100 MCG: 50 INJECTION, SOLUTION INTRAMUSCULAR; INTRAVENOUS at 04:06

## 2021-03-09 RX ADMIN — FENTANYL CITRATE 100 MCG: 50 INJECTION, SOLUTION INTRAMUSCULAR; INTRAVENOUS at 02:04

## 2021-03-09 RX ADMIN — PROPOFOL 15 MCG/KG/MIN: 10 INJECTION, EMULSION INTRAVENOUS at 21:32

## 2021-03-09 RX ADMIN — SODIUM CHLORIDE 15 MG/HR: 9 INJECTION, SOLUTION INTRAVENOUS at 00:59

## 2021-03-09 RX ADMIN — PROPOFOL 20 MCG/KG/MIN: 10 INJECTION, EMULSION INTRAVENOUS at 15:09

## 2021-03-09 RX ADMIN — FENTANYL CITRATE 100 MCG: 50 INJECTION, SOLUTION INTRAMUSCULAR; INTRAVENOUS at 01:09

## 2021-03-09 RX ADMIN — PROPOFOL 35 MCG/KG/MIN: 10 INJECTION, EMULSION INTRAVENOUS at 00:35

## 2021-03-09 RX ADMIN — SODIUM CHLORIDE 15 MG/HR: 9 INJECTION, SOLUTION INTRAVENOUS at 09:02

## 2021-03-09 RX ADMIN — FENTANYL CITRATE 50 MCG: 50 INJECTION, SOLUTION INTRAMUSCULAR; INTRAVENOUS at 19:55

## 2021-03-09 RX ADMIN — DEXMEDETOMIDINE HYDROCHLORIDE 1.5 MCG/KG/HR: 100 INJECTION, SOLUTION, CONCENTRATE INTRAVENOUS at 21:32

## 2021-03-09 RX ADMIN — SODIUM CHLORIDE 15 MG/HR: 9 INJECTION, SOLUTION INTRAVENOUS at 04:37

## 2021-03-09 RX ADMIN — FENTANYL CITRATE 50 MCG: 50 INJECTION, SOLUTION INTRAMUSCULAR; INTRAVENOUS at 23:20

## 2021-03-09 RX ADMIN — DEXMEDETOMIDINE HYDROCHLORIDE 1.2 MCG/KG/HR: 100 INJECTION, SOLUTION, CONCENTRATE INTRAVENOUS at 19:32

## 2021-03-09 RX ADMIN — FENTANYL CITRATE 50 MCG: 50 INJECTION, SOLUTION INTRAMUSCULAR; INTRAVENOUS at 20:47

## 2021-03-09 RX ADMIN — HYDRALAZINE HYDROCHLORIDE 100 MG: 50 TABLET, FILM COATED ORAL at 15:42

## 2021-03-09 RX ADMIN — FENTANYL CITRATE 50 MCG: 50 INJECTION, SOLUTION INTRAMUSCULAR; INTRAVENOUS at 13:49

## 2021-03-09 RX ADMIN — HYDRALAZINE HYDROCHLORIDE 50 MG: 50 TABLET, FILM COATED ORAL at 06:00

## 2021-03-09 RX ADMIN — PROPOFOL 35 MCG/KG/MIN: 10 INJECTION, EMULSION INTRAVENOUS at 11:16

## 2021-03-09 RX ADMIN — HYDRALAZINE HYDROCHLORIDE 100 MG: 50 TABLET, FILM COATED ORAL at 21:12

## 2021-03-09 RX ADMIN — CHLORHEXIDINE GLUCONATE 15 ML: 1.2 RINSE ORAL at 21:13

## 2021-03-09 RX ADMIN — FENTANYL CITRATE 100 MCG: 50 INJECTION, SOLUTION INTRAMUSCULAR; INTRAVENOUS at 07:15

## 2021-03-09 RX ADMIN — DEXMEDETOMIDINE HYDROCHLORIDE 0.2 MCG/KG/HR: 100 INJECTION, SOLUTION, CONCENTRATE INTRAVENOUS at 11:34

## 2021-03-09 RX ADMIN — SODIUM CHLORIDE 10 MG/HR: 9 INJECTION, SOLUTION INTRAVENOUS at 13:27

## 2021-03-09 RX ADMIN — DEXMEDETOMIDINE HYDROCHLORIDE 0.7 MCG/KG/HR: 100 INJECTION, SOLUTION, CONCENTRATE INTRAVENOUS at 16:05

## 2021-03-09 RX ADMIN — LABETALOL HYDROCHLORIDE 20 MG: 5 INJECTION, SOLUTION INTRAVENOUS at 02:38

## 2021-03-09 RX ADMIN — TAZOBACTAM SODIUM AND PIPERACILLIN SODIUM 3.38 G: 375; 3 INJECTION, SOLUTION INTRAVENOUS at 15:23

## 2021-03-09 RX ADMIN — FENTANYL CITRATE 100 MCG: 50 INJECTION, SOLUTION INTRAMUSCULAR; INTRAVENOUS at 09:33

## 2021-03-09 RX ADMIN — TAZOBACTAM SODIUM AND PIPERACILLIN SODIUM 3.38 G: 375; 3 INJECTION, SOLUTION INTRAVENOUS at 06:00

## 2021-03-09 RX ADMIN — FENTANYL CITRATE 100 MCG: 50 INJECTION, SOLUTION INTRAMUSCULAR; INTRAVENOUS at 10:46

## 2021-03-09 RX ADMIN — FENTANYL CITRATE 50 MCG: 50 INJECTION, SOLUTION INTRAMUSCULAR; INTRAVENOUS at 16:27

## 2021-03-09 RX ADMIN — ENALAPRILAT 0.62 MG: 2.5 INJECTION INTRAVENOUS at 21:59

## 2021-03-09 NOTE — PROGRESS NOTES
"  Daily Progress Note.   [facility] INTENSIVE CARE  3/9/2021    Patient:  Name:  Jose R Dalton  MRN:  [id number]  1960  60 y.o.  male         CC/Interval History:  No acute events overnight  maintains on sedation [facility] vent  agitated even on propofol moderate dose      Physical Exam:  /65   Pulse 92   Temp 99.2 °F (37.3 °C) (Oral)   Resp 24   Ht 185.4 cm (73\")   Wt 113 kg (249 lb 5.4 oz)   SpO2 96%   BMI 32.90 kg/m²   Body mass index is 32.9 kg/m².    Intake/Output Summary (Last 24 hours) at 3/9/2021 0929  Last data filed at 3/9/2021 0500  Gross per 24 hour   Intake 4394 ml   Output 4750 ml   Net -356 ml   Vent Settings          Resp Rate (Set): 22     FiO2 (%): 25 %  PEEP/CPAP (cm H2O): 5 cm H20    Minute Ventilation (L/min) (Obs): 14.7 L/min  Resp Rate (Observed) Vent: 22  I:E Ratio (Set): 1:4.36  I:E Ratio (Obs): 3.80:1    PIP Observed (cm H2O): 18 cm H2O  Plateau Pressure (cm H2O): 0 cm H2O    General appearance: Nontoxic on mechanical ventilation agitated  Eyes: anicteric sclerae, moist conjunctivae;  HENT: Atraumatic; ET tube  Neck: Trachea midline; FROM, supple, no thyromegaly or lymphadenopathy  Lungs: Mechanical bilateral breath sounds coarse no wheeze, with normal respiratory effort and no intercostal retractions  CV: Distant no rub  Abdomen: Obese soft bowel sounds present  Extremities: No peripheral edema or extremity lymphadenopathy  Skin: Warm well perfused no diffuse rash  Psych/neuro moves extremities wont follow commands but appears agitated     Data Review:  Notable Labs:  Results from last 7 days   Lab Units 03/09/21  0321 03/08/21  1001 03/08/21  0317 03/07/21  1105   WBC 10*3/mm3 16.21* 17.64* 13.38* 5.94   HEMOGLOBIN g/dL 12.1* 12.5* 12.2* 11.6*   PLATELETS 10*3/mm3 220 212 222 190     Results from last 7 days   Lab Units 03/09/21  0321 03/08/21  0317 03/07/21  1106   SODIUM mmol/L 142 139 141   POTASSIUM mmol/L 4.0 4.4 4.0   CHLORIDE mmol/L 109* 109* 107   CO2 " mmol/L 21.5* 15.9* 25.9   BUN mg/dL 17 16 12   CREATININE mg/dL 1.37* 1.40* 0.99   GLUCOSE mg/dL 134* 143* 107*   CALCIUM mg/dL 8.1* 7.8* 8.2*   Estimated Creatinine Clearance: 76.5 mL/min (A) (by C-G formula based on SCr of 1.37 mg/dL (H)).    Results from last 7 days   Lab Units 03/09/21  0321 03/08/21  1001 03/08/21  0317 03/07/21  1106   AST (SGOT) U/L 32  --  12 12   ALT (SGPT) U/L 8  --  7 9   PLATELETS 10*3/mm3 220 212 222  --        Results from last 7 days   Lab Units 03/08/21  1121 03/07/21  1620   PH, ARTERIAL pH units 7.399 7.311*   PCO2, ARTERIAL mm Hg 32.3* 46.5*   PO2 ART mm Hg 67.9* 56.2*   HCO3 ART mmol/L 19.9* 23.4       Imaging:  Reviewed chest images personally from past 3 days    Scheduled meds:    aspirin, 81 mg, Oral, Daily  atorvastatin, 80 mg, Oral, Nightly  hydrALAZINE, 50 mg, Nasogastric, Q8H  piperacillin-tazobactam, 3.375 g, Intravenous, Q8H  ticagrelor, 90 mg, Oral, BID        ASSESSMENT  /  PLAN:  Aspiration pneumonia  Acute stroke likely secondary to left carotid occlusion status post TPA and stenting  History of right MCA stroke previously  Aspiration pneumonia  Abnormal chest x-ray-question mediastinal mass or hilar mass send for CT chest  HTN  HLD  Seizure history  Smoker  H/o Polysubstance abuse  MARIO  Metabolic acidosis  Leukocytosis  Routine mgmt of [facility] ventilation     Zosyn cont asp pna    vent reviewed adjusted    Agitated with sedation wean - will trial precedex on short term basis to assist with vent weaning.  Also prn fentanyl    Blood pressure control on Cardene.   Hydralazine 50mg TID increase today to 100 tid.     Total critical care time was 34 minutes, excluding any separately billable procedure time.  Time did not overlap with any other provider.    Plan of care and patient course was reviewed with multidisciplinary team in morning rounds.      Deandre Rausch MD  [facility]  03/09/21  1104a

## 2021-03-09 NOTE — PROGRESS NOTES
Discharge Planning Assessment  [facility]     Patient Name: Jose R Dalton  MRN: [id number]  Today's Date: 3/9/2021    Admit Date: 3/7/2021    Discharge Needs Assessment     Row Name 03/09/21 1425       Living Environment    Lives With  alone    Current Living Arrangements  home/apartment/condo    Primary Care Provided by  self;child(philipp)    Provides Primary Care For  no one    Family Caregiver if Needed  sibling(s);child(philipp), adult    Quality of Family Relationships  unable to assess       Resource/Environmental Concerns    Resource/Environmental Concerns  none    Transportation Concerns  car, none       Transition Planning    Patient/Family Anticipates Transition to  inpatient rehabilitation facility    Patient/Family Anticipated Services at Transition  none;       Discharge Needs Assessment    Equipment Currently Used at Home  none    Anticipated Changes Related to Illness  other (see comments)    Equipment Needed After Discharge  none        Discharge Plan     Row Name 03/09/21 1420       Plan    Plan Comments  Pt does have medicare A  Spoke to Harsh and family would like referral to Acute rehab at   They would like pt to stay here at [facility] and have it billed under medicare A    Row Name 03/09/21 0660       Plan    Plan Comments  CCP spoke with patient’s daughter Harsh, [phone]. via telephone.  CCP role explained and discharge planning discussed. Face sheet verified. Pt PCP is unknown.   Pt. emergency contact is his daughter.  Pt lives in house.  He is normally independent with ADL’s. He uses no DME to ambulate. Plan is undetermined  Harsh would like to discuss discharge plans with her father’s siblings to make decisions.  Spoke to Harsh about [facility].  Explained form and discussed if pt would want to be transferred once he is stable.  She states she believes pt has medicare.  Sent pt info to Insurance verification.  CCP to follow up with Harsh once she speaks to family if pt  should be transferred once stable and DC plan      CCP following for DC needs as patient’s clinical course evolves    Row Name 03/09/21 1419       Plan    Plan  Undetermined  Daughter will discuss with pt siblings and make a plan        Continued Care and Services - Admitted Since 3/7/2021    Coordination has not been started for this encounter.         Demographic Summary    No documentation.       Functional Status    No documentation.       Psychosocial    No documentation.       Abuse/Neglect    No documentation.       Legal    No documentation.       Substance Abuse    No documentation.       Patient Forms    No documentation.           Irena Lorenzo RN

## 2021-03-09 NOTE — PROGRESS NOTES
Neurology Note    Patient:  Jose R Dalton    YOB: 1960    REFERRING PHYSICIAN:  Deandre Rausch MD    CHIEF COMPLAINT:    stroke    HISTORY OF PRESENT ILLNESS:   The patient is opening eyes, trying to sit up, moves limbs x 4, not following commands, no seizures, remains intubated, on propofol with plan to change to Precedex noted, prn fentanyl. Heparin stopped, now on DAPT.    Past Medical History:  Past Medical History:   Diagnosis Date   • Elevated cholesterol    • Hypertension    • Obesity    • Seizure (CMS/HCC)    • Seizures (CMS/HCC)    • Stroke (CMS/HCC)        Past Surgical History:  Past Surgical History:   Procedure Laterality Date   • CAROTID ENDARTERECTOMY Right    • CAROTID ENDARTERECTOMY Right    • EMBOLECTOMY N/A 3/7/2021    Procedure: MECHANICAL EMBOLECTOMY, WITH LEFT CAROTID ARTERY ANGIOPLASTY AND STENT PLACEMENT;  Surgeon: Nick Washington MD;  Location: [facility] 18/19;  Service: Neurosurgery;  Laterality: N/A;       Social History:   Social History     Socioeconomic History   • Marital status: Single     Spouse name: Not on file   • Number of children: Not on file   • Years of education: Not on file   • Highest education level: Not on file   Tobacco Use   • Smoking status: Current Every Day Smoker     Packs/day: 0.50   • Smokeless tobacco: Never Used   Substance and Sexual Activity   • Alcohol use: No   • Drug use: Yes     Types: Cocaine(coke), Marijuana     Comment: positive drug screen in 2016, current possession of marijuana 3/7/21   • Sexual activity: Defer        Family History:   History reviewed. No pertinent family history.    Medications Prior to Admission:    Prior to Admission medications    Medication Sig Start Date End Date Taking? Authorizing Provider   aspirin 325 MG tablet Take 325 mg by mouth daily.    Provider, MD David   hydrOXYzine (ATARAX) 25 MG tablet Take 1 tablet by mouth every 6 (six) hours as needed for itching. 7/29/16   Td  Fly SMITH MD   MethylPREDNISolone (MEDROL, WAYNE,) 4 MG tablet Take as directed on package instructions. 16   Fly Appiah MD       Allergies:  Patient has no known allergies.      Review of system  Review of Systems   Unable to perform ROS: Intubated       Vitals:    21 1000   BP: 141/68   Pulse: 106   Resp:    Temp:    SpO2: 93%       Physical exam  Physical Exam  Constitutional:       Interventions: He is intubated.   Cardiovascular:      Rate and Rhythm: Normal rate and regular rhythm.   Pulmonary:      Effort: He is intubated.   Neurological:      Comments: Opens eyes half way to voice, tries to track, no definite facial droop, tries to sit up briefly, moves arm s spontaneously.           Lab Results   Component Value Date    WBC 16.21 (H) 2021    HGB 12.1 (L) 2021    HCT 37.0 (L) 2021    MCV 80.8 2021     2021     Lab Results   Component Value Date    GLUCOSE 134 (H) 2021    BUN 17 2021    CREATININE 1.37 (H) 2021    EGFRIFAFRI 64 2021    BCR 12.4 2021    CO2 21.5 (L) 2021    CALCIUM 8.1 (L) 2021    ALBUMIN 3.60 2021    AST 32 2021    ALT 8 2021     Echo Complete w/ Doppler, Color Flow and Contrast  Order# [id number]  Reading physician: Sarah Reynolds MD Ordering physician: Ever Longoria MD Study date: 3/8/21   Patient Information    Patient Name   Jose R Dalton MRN   [id number] Legal Sex   Male  (Age)   1960 (60 y.o.)   Admission Information    Admission Date/Time Discharge Date/Time Room/Bed   21  10:36 AM  I375/1   PACS Images     Show images for Adult Transthoracic Echo Complete W/ Cont if Necessary Per Protocol    Sedation Narrator Report    Sedation Narrator Report      Interpretation Summary    · Left ventricular wall thickness is consistent with moderate concentric hypertrophy.  · Estimated left ventricular EF = 70% Left ventricular systolic function is  "normal.  · Left ventricular diastolic function was normal.  · Saline test results are negative.  · There is mild calcification of the aortic valve. Mild aortic valve regurgitation is present. Mild aortic valve stenosis is present. Peak velocity of the flow distal to the aortic valve is 260 cm/s. Aortic valve mean pressure gradient is 11.0 mmHg.  · Mild tricuspid valve regurgitation is present. Estimated right ventricular systolic pressure from tricuspid regurgitation is moderately elevated (45-55 mmHg). Calculated right ventricular systolic pressure from tricuspid regurgitation is 46.4 mmHg.      Patient Hx Of Height, Weight, and Vitals    Height Weight BSA (Calculated - sq m) BMI (kg/m2) Pulse BP   185.4 cm (73\") 117 kg (257 lb) 2.39 sq meters 33.98 118 197/94   Blood Pressure at Time of Exam     Left Arm   Blood Pressure 147/65 mmHg         Clinical Indication    Cardiac source of emboli; Heart Failure, Cardiomyopathy or Systemic or Pulmonary Hypertension; Suspected Hypertensive Heart Disease   Cardiac History    Diagnosis Date Comment Source   Elevated cholesterol      Hypertension      Study Description    A complete transthoracic echocardiogram with complete Doppler and color flow was performed. The study is technically good for diagnosis. The quality of the study is limited due to patient positioning and patient being intubated. Verbal consent was obtained from Cardiologist to use Definity and saline image enhancer in order to optimize the study. The use of Definity was indicated as two or more contiguous segments of the left ventricular endocardial border were unable to be adequately visualized by standard imaging methods. 1.3 mL of mechanically activated Definity was mixed with 8.7 mL of normal saline.  A total of 1.5 mL of the resulting Definity solution was administered, and the remaining contrast was wasted and discarded. A total of 20 mL of agitated saline was administered to assess for cardiac shunting. "   Echocardiogram Findings    Left Ventricle Calculated left ventricular EF = 72.3% Estimated left ventricular EF = 70% Left ventricular systolic function is normal.   Normal left ventricular cavity size noted. Left ventricular wall thickness is consistent with moderate concentric hypertrophy. All left ventricular wall segments contract normally. Left ventricular diastolic function was normal.   Right Ventricle Normal right ventricular cavity size and systolic function noted.   Left Atrium The left atrial cavity is mildly dilated.  Saline test results are negative.   Right Atrium Normal right atrial cavity size noted.   Aortic Valve The aortic valve is abnormal in structure. There is mild calcification of the aortic valve. Mild aortic valve regurgitation is present. Mild aortic valve stenosis is present. Peak velocity of the flow distal to the aortic valve is 260 cm/s. Aortic valve mean pressure gradient is 11.0 mmHg.   Mitral Valve The mitral valve is structurally normal with no regurgitation or significant stenosis present.   Tricuspid Valve Mild tricuspid valve regurgitation is present. Estimated right ventricular systolic pressure from tricuspid regurgitation is moderately elevated (45-55 mmHg). Calculated right ventricular systolic pressure from tricuspid regurgitation is 46.4 mmHg.   Pulmonic Valve The pulmonic valve is grossly normal in structure.   Greater Vessels No dilation of the aortic root is present.   Pericardium There is no evidence of pericardial effusion. .         Radiological Studies:  CT HEAD WITHOUT CONTRAST     HISTORY: Stroke     COMPARISON: 03/18/2021     TECHNIQUE: Axial CT imaging was obtained through the brain. No IV  contrast was administered.     FINDINGS:  There are areas of decreased attenuation which are seen within the left  MCA territory, in keeping with evolving infarcts. These appear grossly  stable when compared to yesterday's exam. There is no evidence of  hemorrhagic  transformation. There is mass effect upon the left lateral  ventricle. There is no significant midline shift. There is effacement of  the sulci and gyri within the areas of concern. Large area of  encephalomalacia is again noted within the right MCA territory. Mucosal  thickening is seen within the ethmoid sinuses. Mastoid air cells appear  clear.     IMPRESSION:     1. Evolving infarcts again noted within the left MCA territory, with  associated mass effect, although there is no significant midline shift.  There is no hemorrhagic transformation at this time. MRI would be more  sensitive for assessment of full extent of areas of infarction.     Radiation dose reduction techniques were utilized, including automated  exposure control and exposure modulation based on body size.     This report was finalized on 3/9/2021 5:05 AM by Dr. Verónica Robisn M.D.         During this visit the following were done:  Labs Reviewed [x]    Labs Ordered []    Radiology Reports Reviewed [x]    Radiology Ordered []    EKG, echo, and/or stress test reviewed [x]    EEG results reviewed  []    EEG reviewed and interpreted per myself   []    Discussed case with neurointerventionalist or neuroradiologist []    Referring Provider Records Reviewed []    ER Records Reviewed []    Hospital Records Reviewed []    History Obtained From Family []    Radiological images view and Interpreted per myself [x]    Case Discussed with referring provider []     Decision to obtain and request outside records  []        Assessment and Plan     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 Sedated exam. CT with evolving sizable scattered LMCA territory infarcts. Guarded prognosis.   - ICU care.   - Wean sedation and vent as tolerated.   - MRI today.   - Continue DAPT with aspirin and Brilinta.   - high dose statin.   - SBP<140.   - ST, PT, OT.       Electronically signed by Tony Fraser MD on 3/9/2021 at 11:01 EST

## 2021-03-09 NOTE — DISCHARGE PLACEMENT REQUEST
"Yohan Nowak (60 y.o. Male)     Date of Birth Social Security Number Address Home Phone MRN    1960  [address] [address]  [place] [id number] [phone] [id number]    [employer] Marital Status          None Single       Admission Date Admission Type Admitting Provider Attending Provider Department, Room/Bed    3/7/21 Emergency Deandre Rausch MD Kellie, Brandon John, MD [facility] INTENSIVE CARE, I375/1    Discharge Date Discharge Disposition Discharge Destination                       Attending Provider: Deandre Rausch MD    Allergies: No Known Allergies    Isolation: None   Infection: None   Code Status: CPR    Ht: 185.4 cm (73\")   Wt: 113 kg (249 lb 5.4 oz)    Admission Cmt: None   Principal Problem: None                Active Insurance as of 3/7/2021     Primary Coverage     Payor Plan Insurance Group Employer/Plan Group    [facility] DEPT [id number]     Payor Plan Address Payor Plan Phone Number Payor Plan Fax Number Effective Dates    [facility] OFFICE OF COMMUNITY CARE [phone]  1/1/2016 - None Entered    [address] [address]       [facility] [address]       Subscriber Name Subscriber Birth Date Member ID       YOHAN NOWAK 1960 [id number]                 Emergency Contacts      (Rel.) Home Phone Work Phone Mobile Phone    Abelardo Nowakta (Daughter) -- -- [phone]    Nowak Naty (Sister) -- [phone] --    Jayashree Carpenter (Other) [phone] -- --    Arjun Nowak (Brother) -- -- --              "

## 2021-03-09 NOTE — PLAN OF CARE
Goal Outcome Evaluation:        Outcome Summary: PRN BP meds given to manage SBP <150. Patient opening eyes with stimulation, moving soo UE spontaneously but not following commands, withdraws soo LE, when sedation weans, patient tries to sit up in bed. Attempt made to wean proprofol with fent pushes q1h without success. PERRL, no unilateral weakness or gaze preference. Labs stable this AM. CT with evolving L MCA infarct no hemorrhage.

## 2021-03-09 NOTE — PROGRESS NOTES
[facility] NEUROSURGERY PROGRESS NOTE    PATIENT IDENTIFICATION:   Name:  Jose R Dalton      MRN:  [id number]     60 y.o.  male               CC: POD 2 left ICA thrombectomy, angioplasty, stent placement      Subjective     Interval History: More alert per nursing but still requires propofol and Cardene Cardene to keep SBP less than 150.  P2Y12 very low this a.m.    ROS:  Unable to assess due to intubation, sedation    Objective     Vital signs in last 24 hours:  Temp:  [99.2 °F (37.3 °C)-99.4 °F (37.4 °C)] 99.2 °F (37.3 °C)  Heart Rate:  [] 88  Resp:  [15-29] 15  BP: (115-197)/(59-95) 115/64  Arterial Line BP: ()/(48-95) 124/57  FiO2 (%):  [21 %-100 %] 21 %      Intake/Output this shift:  I/O this shift:  In: 809 [I.V.:709; Other:100]  Out: 900 [Urine:900]      Intake/Output last 3 shifts:  I/O last 3 completed shifts:  In: 6955 [I.V.:6605; Other:75; NG/GT:225; IV Piggyback:50]  Out: 5600 [Urine:5600]    LABS:  Results from last 7 days   Lab Units 03/09/21  0321 03/08/21  1001 03/08/21  0317   WBC 10*3/mm3 16.21* 17.64* 13.38*   HEMOGLOBIN g/dL 12.1* 12.5* 12.2*   HEMATOCRIT % 37.0* 37.9 37.8   PLATELETS 10*3/mm3 220 212 222     Results from last 7 days   Lab Units 03/09/21  0321 03/08/21  0317 03/07/21  1106   SODIUM mmol/L 142 139 141   POTASSIUM mmol/L 4.0 4.4 4.0   CHLORIDE mmol/L 109* 109* 107   CO2 mmol/L 21.5* 15.9* 25.9   BUN mg/dL 17 16 12   CREATININE mg/dL 1.37* 1.40* 0.99   CALCIUM mg/dL 8.1* 7.8* 8.2*   BILIRUBIN mg/dL 0.5 0.3 0.3   ALK PHOS U/L 46 46 47   ALT (SGPT) U/L 8 7 9   AST (SGOT) U/L 32 12 12   GLUCOSE mg/dL 134* 143* 107*      P2Y12 3/9=9    IMAGING STUDIES:  CT head-evolving infarct left MCA distribution.  No evidence of hemorrhage.    I personally viewed and interpreted the patient's CT head.      Meds reviewed/changed: Yes    Current Facility-Administered Medications:   •  aspirin chewable tablet 81 mg, 81 mg, Oral, Daily, Allie Montes, APRN, 81 mg at 03/09/21 0980  •   atorvastatin (LIPITOR) tablet 80 mg, 80 mg, Oral, Nightly, Nick Washington MD, 80 mg at 03/08/21 2050  •  dexmedetomidine (PRECEDEX) 400 mcg in 100 mL NS infusion kit, 0.2-1.5 mcg/kg/hr, Intravenous, Titrated, Deandre Rausch MD, Last Rate: 19.8 mL/hr at 03/09/21 1244, 0.7 mcg/kg/hr at 03/09/21 1244  •  enalaprilat (VASOTEC) injection 0.625 mg, 0.625 mg, Intravenous, Q6H PRN, Nick Washington MD, 0.625 mg at 03/08/21 2256  •  fentaNYL citrate (PF) (SUBLIMAZE) injection 50 mcg, 50 mcg, Intravenous, Q30 Min PRN, Deandre Rausch MD, 50 mcg at 03/09/21 1349  •  fentaNYL citrate (SUBLIMAZE) 1250 mcg in sodium chloride 0.9% 250 mL infusion, 75 mcg/hr, Intravenous, Continuous, Nick Washington MD  •  hydrALAZINE (APRESOLINE) injection 20 mg, 20 mg, Intravenous, Q4H PRN, Ever Longoria MD, 20 mg at 03/09/21 0141  •  hydrALAZINE (APRESOLINE) tablet 100 mg, 100 mg, Nasogastric, Q8H, Deandre Rausch MD  •  labetalol (NORMODYNE,TRANDATE) injection 20 mg, 20 mg, Intravenous, Q10 Min PRN, Nick Washington MD, 20 mg at 03/09/21 0238  •  metoprolol tartrate (LOPRESSOR) injection 5 mg, 5 mg, Intravenous, Q6H PRN, Ai Spangler MD, 5 mg at 03/08/21 0642  •  niCARdipine (CARDENE) 50 mg in sodium chloride 0.9 % 250 mL IVPB, 5-15 mg/hr, Intravenous, Titrated, Deandre Rausch MD, Last Rate: 50 mL/hr at 03/09/21 1327, 10 mg/hr at 03/09/21 1327  •  piperacillin-tazobactam (ZOSYN) 3.375 g in iso-osmotic dextrose 50 ml (premix), 3.375 g, Intravenous, Q8H, Nick Washington MD, 3.375 g at 03/09/21 0600  •  propofol (DIPRIVAN) infusion 10 mg/mL 100 mL, 5-50 mcg/kg/min, Intravenous, Titrated, Deandre Rausch MD, Last Rate: 15.24 mL/hr at 03/09/21 1308, 20 mcg/kg/min at 03/09/21 1308  •  sodium chloride 0.9 % flush 10 mL, 10 mL, Intravenous, PRN, Nick Washington MD  •  ticagrelor (BRILINTA) tablet 90 mg, 90 mg, Oral, BID, Allie Montes, APRN, Stopped at 03/09/21 0900      Physical  "Exam:    General:   Intubated and sedated. open eyes to name.  Seems to attempt to track movement.  Positive cough.  Patient overbreathing vent.  HEENT:    Orally intubated.  Nasogastric feeding tube in place  CN III IV VI: Pupils 2 mm brisk. right neglect.  Does not cross midline to right   CN VII: No obvious facial asymmetry, but equipment limits exam    Motor: Moves all extremities spontaneously.  No focal weakness noted the patient does not follow commands.  May have slightly more movement on the left than right to painful stimuli  Sensation: Intact to pain  Station and Gait: Unable to assess due to intubation and sedation  Coordination: Unable to assess as patient does not follow commands  Extremities:   Wearing SCD.  Right groin site with clean dry intact dressing.  Distal pulses 1+.    Assessment/Plan     ASSESSMENT:      Cerebrovascular accident (CVA) (CMS/[facility])    Patient with improved neurologic status today.  Opening eyes to name and moving extremities spontaneously but yet to follow commands.  Remains on propofol as his heart rate and blood pressure gets high when he gets restless.  Continues to need Cardene to keep systolic less than 150.  P2Y12 equal to 9 this morning.  Holding Brilinta today.  Will recheck lab in a.m. and adjust dose as appropriate.  CT head today shows no evidence of hemorrhage.  MRI planned today.    PLAN:   Hold Brilinta today  Repeat P2Y12 tomorrow  Await MRI brain  Otherwise stroke management work-up per neurology.  Wean propofol as tolerated    I discussed the patient's findings and my recommendations with patient, nursing staff and Dr. Washington      LOS: 2 days       Allie Montes, APRN  3/9/2021  14:33 EST    \"Dictated utilizing Dragon dictation\".      "

## 2021-03-09 NOTE — PLAN OF CARE
Continues on Vent. 21% fio2 peep 5. Opening eyes with stimulation, moving all extremities nonpurposefully and not following commands, ALEXANDER. MRI obtained. Precedex started and able to wean propofol down to 15mcg/kg/min. Cardene weaned off. BP continues to be high when awake, PRN Fentanyl required at times.

## 2021-03-10 ENCOUNTER — APPOINTMENT (OUTPATIENT)
Dept: GENERAL RADIOLOGY | Facility: HOSPITAL | Age: 61
End: 2021-03-10

## 2021-03-10 LAB
ALBUMIN SERPL-MCNC: 3.4 G/DL (ref 3.5–5.2)
ALBUMIN/GLOB SERPL: 1.3 G/DL
ALP SERPL-CCNC: 46 U/L (ref 39–117)
ALT SERPL W P-5'-P-CCNC: 22 U/L (ref 1–41)
ANION GAP SERPL CALCULATED.3IONS-SCNC: 8.7 MMOL/L (ref 5–15)
AST SERPL-CCNC: 94 U/L (ref 1–40)
BILIRUB SERPL-MCNC: 0.5 MG/DL (ref 0–1.2)
BUN SERPL-MCNC: 18 MG/DL (ref 6–20)
BUN/CREAT SERPL: 18.6 (ref 7–25)
CALCIUM SPEC-SCNC: 8.3 MG/DL (ref 8.6–10.5)
CHLORIDE SERPL-SCNC: 107 MMOL/L (ref 98–107)
CO2 SERPL-SCNC: 25.3 MMOL/L (ref 22–29)
CREAT SERPL-MCNC: 0.97 MG/DL (ref 0.76–1.27)
DEPRECATED RDW RBC AUTO: 41.7 FL (ref 37–54)
ERYTHROCYTE [DISTWIDTH] IN BLOOD BY AUTOMATED COUNT: 14.5 % (ref 12.3–15.4)
GFR SERPL CREATININE-BSD FRML MDRD: 96 ML/MIN/1.73
GLOBULIN UR ELPH-MCNC: 2.6 GM/DL
GLUCOSE SERPL-MCNC: 164 MG/DL (ref 65–99)
HCT VFR BLD AUTO: 36.4 % (ref 37.5–51)
HGB BLD-MCNC: 11.7 G/DL (ref 13–17.7)
MCH RBC QN AUTO: 25.7 PG (ref 26.6–33)
MCHC RBC AUTO-ENTMCNC: 32.1 G/DL (ref 31.5–35.7)
MCV RBC AUTO: 79.8 FL (ref 79–97)
PA ADP PRP-ACNC: 21 PRU (ref 194–418)
PA ADP PRP-ACNC: 28 PRU (ref 194–418)
PLATELET # BLD AUTO: 199 10*3/MM3 (ref 140–450)
PMV BLD AUTO: 11.1 FL (ref 6–12)
POTASSIUM SERPL-SCNC: 3.8 MMOL/L (ref 3.5–5.2)
PROT SERPL-MCNC: 6 G/DL (ref 6–8.5)
QT INTERVAL: 396 MS
RBC # BLD AUTO: 4.56 10*6/MM3 (ref 4.14–5.8)
SODIUM SERPL-SCNC: 141 MMOL/L (ref 136–145)
WBC # BLD AUTO: 12.47 10*3/MM3 (ref 3.4–10.8)

## 2021-03-10 PROCEDURE — 99024 POSTOP FOLLOW-UP VISIT: CPT | Performed by: NURSE PRACTITIONER

## 2021-03-10 PROCEDURE — 80053 COMPREHEN METABOLIC PANEL: CPT | Performed by: NEUROLOGICAL SURGERY

## 2021-03-10 PROCEDURE — 94799 UNLISTED PULMONARY SVC/PX: CPT

## 2021-03-10 PROCEDURE — 85027 COMPLETE CBC AUTOMATED: CPT | Performed by: NEUROLOGICAL SURGERY

## 2021-03-10 PROCEDURE — 94003 VENT MGMT INPAT SUBQ DAY: CPT

## 2021-03-10 PROCEDURE — 93005 ELECTROCARDIOGRAM TRACING: CPT | Performed by: INTERNAL MEDICINE

## 2021-03-10 PROCEDURE — 25010000002 PROPOFOL 10 MG/ML EMULSION: Performed by: INTERNAL MEDICINE

## 2021-03-10 PROCEDURE — 25010000002 FENTANYL CITRATE (PF) 100 MCG/2ML SOLUTION: Performed by: INTERNAL MEDICINE

## 2021-03-10 PROCEDURE — 93010 ELECTROCARDIOGRAM REPORT: CPT | Performed by: INTERNAL MEDICINE

## 2021-03-10 PROCEDURE — 85576 BLOOD PLATELET AGGREGATION: CPT | Performed by: NEUROLOGICAL SURGERY

## 2021-03-10 PROCEDURE — 25010000002 ONDANSETRON PER 1 MG: Performed by: INTERNAL MEDICINE

## 2021-03-10 PROCEDURE — 25010000002 PIPERACILLIN SOD-TAZOBACTAM PER 1 G: Performed by: NEUROLOGICAL SURGERY

## 2021-03-10 PROCEDURE — 99232 SBSQ HOSP IP/OBS MODERATE 35: CPT | Performed by: PSYCHIATRY & NEUROLOGY

## 2021-03-10 PROCEDURE — 71045 X-RAY EXAM CHEST 1 VIEW: CPT

## 2021-03-10 RX ORDER — ONDANSETRON 2 MG/ML
4 INJECTION INTRAMUSCULAR; INTRAVENOUS EVERY 6 HOURS PRN
Status: DISCONTINUED | OUTPATIENT
Start: 2021-03-10 | End: 2021-03-19 | Stop reason: HOSPADM

## 2021-03-10 RX ORDER — QUETIAPINE FUMARATE 50 MG/1
50 TABLET, FILM COATED ORAL EVERY 12 HOURS SCHEDULED
Status: DISCONTINUED | OUTPATIENT
Start: 2021-03-10 | End: 2021-03-11

## 2021-03-10 RX ADMIN — DEXMEDETOMIDINE HYDROCHLORIDE 1.5 MCG/KG/HR: 100 INJECTION, SOLUTION, CONCENTRATE INTRAVENOUS at 08:20

## 2021-03-10 RX ADMIN — FENTANYL CITRATE 50 MCG: 50 INJECTION, SOLUTION INTRAMUSCULAR; INTRAVENOUS at 11:45

## 2021-03-10 RX ADMIN — QUETIAPINE FUMARATE 50 MG: 50 TABLET, FILM COATED ORAL at 21:13

## 2021-03-10 RX ADMIN — FENTANYL CITRATE 50 MCG: 50 INJECTION, SOLUTION INTRAMUSCULAR; INTRAVENOUS at 21:11

## 2021-03-10 RX ADMIN — FENTANYL CITRATE 50 MCG: 50 INJECTION, SOLUTION INTRAMUSCULAR; INTRAVENOUS at 05:41

## 2021-03-10 RX ADMIN — CHLORHEXIDINE GLUCONATE 15 ML: 1.2 RINSE ORAL at 08:18

## 2021-03-10 RX ADMIN — TAZOBACTAM SODIUM AND PIPERACILLIN SODIUM 3.38 G: 375; 3 INJECTION, SOLUTION INTRAVENOUS at 06:23

## 2021-03-10 RX ADMIN — DEXMEDETOMIDINE HYDROCHLORIDE 1.3 MCG/KG/HR: 100 INJECTION, SOLUTION, CONCENTRATE INTRAVENOUS at 20:01

## 2021-03-10 RX ADMIN — DEXMEDETOMIDINE HYDROCHLORIDE 1.5 MCG/KG/HR: 100 INJECTION, SOLUTION, CONCENTRATE INTRAVENOUS at 00:43

## 2021-03-10 RX ADMIN — HYDRALAZINE HYDROCHLORIDE 100 MG: 50 TABLET, FILM COATED ORAL at 14:02

## 2021-03-10 RX ADMIN — HYDRALAZINE HYDROCHLORIDE 100 MG: 50 TABLET, FILM COATED ORAL at 21:13

## 2021-03-10 RX ADMIN — CHLORHEXIDINE GLUCONATE 15 ML: 1.2 RINSE ORAL at 20:22

## 2021-03-10 RX ADMIN — PROPOFOL 25 MCG/KG/MIN: 10 INJECTION, EMULSION INTRAVENOUS at 12:52

## 2021-03-10 RX ADMIN — FENTANYL CITRATE 50 MCG: 50 INJECTION, SOLUTION INTRAMUSCULAR; INTRAVENOUS at 00:07

## 2021-03-10 RX ADMIN — DEXMEDETOMIDINE HYDROCHLORIDE 1.5 MCG/KG/HR: 100 INJECTION, SOLUTION, CONCENTRATE INTRAVENOUS at 02:57

## 2021-03-10 RX ADMIN — FENTANYL CITRATE 50 MCG: 50 INJECTION, SOLUTION INTRAMUSCULAR; INTRAVENOUS at 04:04

## 2021-03-10 RX ADMIN — DEXMEDETOMIDINE HYDROCHLORIDE 1.3 MCG/KG/HR: 100 INJECTION, SOLUTION, CONCENTRATE INTRAVENOUS at 22:46

## 2021-03-10 RX ADMIN — DEXMEDETOMIDINE HYDROCHLORIDE 1.5 MCG/KG/HR: 100 INJECTION, SOLUTION, CONCENTRATE INTRAVENOUS at 10:55

## 2021-03-10 RX ADMIN — FENTANYL CITRATE 50 MCG: 50 INJECTION, SOLUTION INTRAMUSCULAR; INTRAVENOUS at 06:59

## 2021-03-10 RX ADMIN — QUETIAPINE FUMARATE 50 MG: 50 TABLET, FILM COATED ORAL at 12:04

## 2021-03-10 RX ADMIN — FENTANYL CITRATE 50 MCG: 50 INJECTION, SOLUTION INTRAMUSCULAR; INTRAVENOUS at 20:30

## 2021-03-10 RX ADMIN — ASPIRIN 81 MG: 81 TABLET, CHEWABLE ORAL at 08:18

## 2021-03-10 RX ADMIN — DEXMEDETOMIDINE HYDROCHLORIDE 1.4 MCG/KG/HR: 100 INJECTION, SOLUTION, CONCENTRATE INTRAVENOUS at 16:53

## 2021-03-10 RX ADMIN — PROPOFOL 20 MCG/KG/MIN: 10 INJECTION, EMULSION INTRAVENOUS at 04:05

## 2021-03-10 RX ADMIN — PROPOFOL 30 MCG/KG/MIN: 10 INJECTION, EMULSION INTRAVENOUS at 23:04

## 2021-03-10 RX ADMIN — FENTANYL CITRATE 50 MCG: 50 INJECTION, SOLUTION INTRAMUSCULAR; INTRAVENOUS at 19:42

## 2021-03-10 RX ADMIN — ONDANSETRON 4 MG: 2 INJECTION INTRAMUSCULAR; INTRAVENOUS at 05:12

## 2021-03-10 RX ADMIN — TAZOBACTAM SODIUM AND PIPERACILLIN SODIUM 3.38 G: 375; 3 INJECTION, SOLUTION INTRAVENOUS at 14:02

## 2021-03-10 RX ADMIN — METOPROLOL TARTRATE 5 MG: 1 INJECTION, SOLUTION INTRAVENOUS at 01:23

## 2021-03-10 RX ADMIN — PROPOFOL 25 MCG/KG/MIN: 10 INJECTION, EMULSION INTRAVENOUS at 08:13

## 2021-03-10 RX ADMIN — FENTANYL CITRATE 50 MCG: 50 INJECTION, SOLUTION INTRAMUSCULAR; INTRAVENOUS at 03:28

## 2021-03-10 RX ADMIN — FENTANYL CITRATE 50 MCG: 50 INJECTION, SOLUTION INTRAMUSCULAR; INTRAVENOUS at 10:52

## 2021-03-10 RX ADMIN — FENTANYL CITRATE 50 MCG: 50 INJECTION, SOLUTION INTRAMUSCULAR; INTRAVENOUS at 01:44

## 2021-03-10 RX ADMIN — ATORVASTATIN CALCIUM 80 MG: 80 TABLET, FILM COATED ORAL at 21:13

## 2021-03-10 RX ADMIN — DEXMEDETOMIDINE HYDROCHLORIDE 1.5 MCG/KG/HR: 100 INJECTION, SOLUTION, CONCENTRATE INTRAVENOUS at 14:07

## 2021-03-10 RX ADMIN — HYDRALAZINE HYDROCHLORIDE 100 MG: 50 TABLET, FILM COATED ORAL at 05:18

## 2021-03-10 RX ADMIN — DEXMEDETOMIDINE HYDROCHLORIDE 1.5 MCG/KG/HR: 100 INJECTION, SOLUTION, CONCENTRATE INTRAVENOUS at 05:41

## 2021-03-10 NOTE — PROGRESS NOTES
Neurology Note    Patient:  Jose R Dalton    YOB: 1960    REFERRING PHYSICIAN:  Deandre Rausch MD    CHIEF COMPLAINT:    stroke    HISTORY OF PRESENT ILLNESS:   The patient remains sedated on Precedex, prn fentanyl for motor agitation, moves limbs x 4, does not follow commands, remains on Cardene to keep SBP<140.    Past Medical History:  Past Medical History:   Diagnosis Date   • Elevated cholesterol    • Hypertension    • Obesity    • Seizure (CMS/HCC)    • Seizures (CMS/HCC)    • Stroke (CMS/HCC)        Past Surgical History:  Past Surgical History:   Procedure Laterality Date   • CAROTID ENDARTERECTOMY Right    • CAROTID ENDARTERECTOMY Right    • EMBOLECTOMY N/A 3/7/2021    Procedure: MECHANICAL EMBOLECTOMY, WITH LEFT CAROTID ARTERY ANGIOPLASTY AND STENT PLACEMENT;  Surgeon: Nick Washington MD;  Location: [facility] 18/19;  Service: Neurosurgery;  Laterality: N/A;       Social History:   Social History     Socioeconomic History   • Marital status: Single     Spouse name: Not on file   • Number of children: Not on file   • Years of education: Not on file   • Highest education level: Not on file   Tobacco Use   • Smoking status: Current Every Day Smoker     Packs/day: 0.50   • Smokeless tobacco: Never Used   Substance and Sexual Activity   • Alcohol use: No   • Drug use: Yes     Types: Cocaine(coke), Marijuana     Comment: positive drug screen in 2016, current possession of marijuana 3/7/21   • Sexual activity: Defer        Family History:   History reviewed. No pertinent family history.    Medications Prior to Admission:    Prior to Admission medications    Medication Sig Start Date End Date Taking? Authorizing Provider   aspirin 325 MG tablet Take 325 mg by mouth daily.    Provider, MD David   hydrOXYzine (ATARAX) 25 MG tablet Take 1 tablet by mouth every 6 (six) hours as needed for itching. 7/29/16   Fly Appiah MD   MethylPREDNISolone (MEDROL, WAYNE,) 4 MG tablet Take  as directed on package instructions. 7/29/16   Fly Appiah MD       Allergies:  Patient has no known allergies.      Review of system  Review of Systems   Unable to perform ROS: Intubated       Vitals:    03/10/21 1100   BP: 133/77   Pulse: 74   Resp:    Temp:    SpO2: 100%       Physical exam  Physical Exam  Cardiovascular:      Rate and Rhythm: Normal rate and regular rhythm.   Neurological:      Comments: Intubated, sedated, opens eyes intermediate and attempts to sit up briefly, moves arms spontaneously.           Lab Results   Component Value Date    WBC 12.47 (H) 03/10/2021    HGB 11.7 (L) 03/10/2021    HCT 36.4 (L) 03/10/2021    MCV 79.8 03/10/2021     03/10/2021     Lab Results   Component Value Date    GLUCOSE 164 (H) 03/10/2021    BUN 18 03/10/2021    CREATININE 0.97 03/10/2021    EGFRIFAFRI 96 03/10/2021    BCR 18.6 03/10/2021    CO2 25.3 03/10/2021    CALCIUM 8.3 (L) 03/10/2021    ALBUMIN 3.40 (L) 03/10/2021    AST 94 (H) 03/10/2021    ALT 22 03/10/2021     MRI EXAMINATION BRAIN WITHOUT CONTRAST     HISTORY: Stroke, follow-up.     COMPARISON: CT head 03/09/2021.     TECHNIQUE: A MRI examination of the brain was performed before and after  the intravenous administration of contrast utilizing sagittal T1, axial  diffusion, T1, T2, T2 FLAIR, as well as gradient echo T2 imaging.     FINDINGS: Areas of acute infarction are identified involving the left  parietal lobe laterally and superior laterally. The more posterior  infarct measures approximately 3 x 4 cm in size. The anterior infarct  measures approximately 3.7 x 1.8 cm in size and involves the  parietotemporal region, as well as extending to and involving the  posterior aspect of the operculum.     Remote infarcts involving the right parietooccipital region are noted  laterally and superolaterally on the right.     Hydrocephalus or midline shift.     There is expected flow-void in the basilar artery and in the distal  aspect of the internal  carotid arteries bilaterally on the axial T2  sequence.     IMPRESSION:  1.  There are acute infarcts involving the left MCA vascular  distribution with infarction involving the left parietal lobe, as well  as the parietotemporal and posterior frontal region. The largest  involves the left parietal lobe where the area of infarction measures  approximately 3 x 4 cm in transverse dimension.  2.  Remote infarcts involving the parietooccipital region on the right  laterally and superiorly laterally are noted.          ECG 12 Lead  Order: [id number]  Status:  Preliminary result   Visible to patient:  No (not released) Next appt:  None  Component   Ref Range & Units 3/10/21 0928 3/7/21 1116   QT Interval   ms 396 P  397       Narrative & Impression    HEART RATE= 73  bpm  RR Interval= 816  ms  ND Interval= 143  ms  P Horizontal Axis= 34  deg  P Front Axis= 74  deg  QRSD Interval= 94  ms  QT Interval= 396  ms  QRS Axis= 53  deg  T Wave Axis= 70  deg  - NORMAL ECG -  Sinus rhythm  Electronically Signed By:   Date and Time of Study: 2021-03-10 09:28:14               During this visit the following were done:  Labs Reviewed [x]    Labs Ordered []    Radiology Reports Reviewed [x]    Radiology Ordered []    EKG, echo, and/or stress test reviewed [x]    EEG results reviewed  []    EEG reviewed and interpreted per myself   []    Discussed case with neurointerventionalist or neuroradiologist []    Referring Provider Records Reviewed []    ER Records Reviewed []    Hospital Records Reviewed []    History Obtained From Family []    Radiological images view and Interpreted per myself [x]    Case Discussed with referring provider []     Decision to obtain and request outside records  []        Assessment and Plan     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 Sedated exam. MRI with evolving sizable scattered LMCA territory infarcts, large old RMCA stroke. Restlessness/agitation requiring heavy sedation.   - ICU care.   - Wean  sedation and vent as tolerated.   - Trial of Seroquel for agitation.   - Continue DAPT with aspirin and Brilinta.   - high dose statin.   - SBP<140.   - ST, PT, OT.              Electronically signed by Tony Fraser MD on 3/10/2021 at 11:08 EST

## 2021-03-10 NOTE — PROGRESS NOTES
[facility], [place], operated by [facility] NEUROSURGERY PROGRESS NOTE    PATIENT IDENTIFICATION:   Name:  Jose R Dalton      MRN:  [id number]     60 y.o.  male               CC: POD 3 left ICA thrombectomy, angioplasty, stent placement      Subjective     Interval History: Remains on sedation with propofol and precedex.  Nurse reports quite agitated this morning sitting up in bed and moving all extremities.  Also received multiple doses of fentanyl for agitation.  Remains on vent.    ROS:  Unable to assess due to intubation, sedation    Objective     Vital signs in last 24 hours:  Temp:  [98.6 °F (37 °C)-99.2 °F (37.3 °C)] 98.8 °F (37.1 °C)  Heart Rate:  [] 73  Resp:  [15-30] 22  BP: (107-171)/(59-99) 134/78  Arterial Line BP: (108-178)/(51-74) 154/67  FiO2 (%):  [20 %-25 %] 21 %      Intake/Output this shift:  No intake/output data recorded.      Intake/Output last 3 shifts:  I/O last 3 completed shifts:  In: 6144 [I.V.:4553; Other:295; NG/GT:1246; IV Piggyback:50]  Out: 5950 [Urine:5950]    LABS:  Results from last 7 days   Lab Units 03/10/21  0427 03/09/21  0321 03/08/21  1001   WBC 10*3/mm3 12.47* 16.21* 17.64*   HEMOGLOBIN g/dL 11.7* 12.1* 12.5*   HEMATOCRIT % 36.4* 37.0* 37.9   PLATELETS 10*3/mm3 199 220 212     Results from last 7 days   Lab Units 03/10/21  0427 03/09/21  0321 03/08/21  0317   SODIUM mmol/L 141 142 139   POTASSIUM mmol/L 3.8 4.0 4.4   CHLORIDE mmol/L 107 109* 109*   CO2 mmol/L 25.3 21.5* 15.9*   BUN mg/dL 18 17 16   CREATININE mg/dL 0.97 1.37* 1.40*   CALCIUM mg/dL 8.3* 8.1* 7.8*   BILIRUBIN mg/dL 0.5 0.5 0.3   ALK PHOS U/L 46 46 46   ALT (SGPT) U/L 22 8 7   AST (SGOT) U/L 94* 32 12   GLUCOSE mg/dL 164* 134* 143*      P2Y12 3/10- 21    IMAGING STUDIES:  MRI brain- scattered left MCA distribution acute infarct largest in the left parietal lobe.  Chronic right MCA distribution infarct.    I personally viewed and interpreted the patient's MRI.  Also reviewed by discussed with Dr. Felix Renner reviewed/changed:  Yes    Current Facility-Administered Medications:   •  aspirin chewable tablet 81 mg, 81 mg, Oral, Daily, Allie Montes, APRN, 81 mg at 03/10/21 0818  •  atorvastatin (LIPITOR) tablet 80 mg, 80 mg, Oral, Nightly, Nick Washington MD, 80 mg at 03/09/21 2112  •  chlorhexidine (PERIDEX) 0.12 % solution 15 mL, 15 mL, Mouth/Throat, Q12H, Deandre Rausch MD, 15 mL at 03/10/21 0818  •  dexmedetomidine (PRECEDEX) 400 mcg in 100 mL NS infusion kit, 0.2-1.5 mcg/kg/hr, Intravenous, Titrated, Deandre Rausch MD, Last Rate: 42.4 mL/hr at 03/10/21 0820, 1.5 mcg/kg/hr at 03/10/21 0820  •  enalaprilat (VASOTEC) injection 0.625 mg, 0.625 mg, Intravenous, Q6H PRN, Nick Washington MD, 0.625 mg at 03/09/21 2159  •  fentaNYL citrate (PF) (SUBLIMAZE) injection 50 mcg, 50 mcg, Intravenous, Q30 Min PRN, Deandre Rausch MD, 50 mcg at 03/10/21 0659  •  fentaNYL citrate (SUBLIMAZE) 1250 mcg in sodium chloride 0.9% 250 mL infusion, 75 mcg/hr, Intravenous, Continuous, Nick Washington MD  •  hydrALAZINE (APRESOLINE) injection 20 mg, 20 mg, Intravenous, Q4H PRN, Ever Longoria MD, 20 mg at 03/09/21 0141  •  hydrALAZINE (APRESOLINE) tablet 100 mg, 100 mg, Nasogastric, Q8H, Deandre Rausch MD, 100 mg at 03/10/21 0518  •  labetalol (NORMODYNE,TRANDATE) injection 20 mg, 20 mg, Intravenous, Q10 Min PRN, Nick Washington MD, 20 mg at 03/09/21 0238  •  metoprolol tartrate (LOPRESSOR) injection 5 mg, 5 mg, Intravenous, Q6H PRN, Ai Spangler MD, 5 mg at 03/10/21 0123  •  niCARdipine (CARDENE) 50 mg in sodium chloride 0.9 % 250 mL IVPB, 5-15 mg/hr, Intravenous, Titrated, Deandre Rausch MD, Stopped at 03/09/21 1514  •  ondansetron (ZOFRAN) injection 4 mg, 4 mg, Intravenous, Q6H PRN, Ai Spangler MD, 4 mg at 03/10/21 0512  •  piperacillin-tazobactam (ZOSYN) 3.375 g in iso-osmotic dextrose 50 ml (premix), 3.375 g, Intravenous, Q8H, Nick Washington MD, 3.375 g at 03/10/21 0623  •  propofol  "(DIPRIVAN) infusion 10 mg/mL 100 mL, 5-50 mcg/kg/min, Intravenous, Titrated, Deandre Rausch MD, Last Rate: 16.95 mL/hr at 03/10/21 0813, 25 mcg/kg/min at 03/10/21 0813  •  sodium chloride 0.9 % flush 10 mL, 10 mL, Intravenous, PRN, Nick Washington MD  •  ticagrelor (BRILINTA) tablet 90 mg, 90 mg, Oral, BID, Allie Montes, APRN, Stopped at 03/09/21 0900      Physical Exam:    General:   Intubated and sedated. open eyes to painful stimuli with propofol held.  Blood pressure spikes with stimulation.  Precedex not held due to this. Positive cough.  Patient overbreathing vent.  HEENT:    Orally intubated.  Nasogastric feeding tube in place  CN III IV VI: Pupils 2 mm brisk.  Pupils midpoint.  CN VII: No obvious facial asymmetry, but equipment limits exam    Motor: Weak movements bilateral upper and lower extremities to painful stimuli.    Sensation: Intact to pain  Station and Gait: Unable to assess due to intubation and sedation  Coordination: Unable to assess as patient does not follow commands  Extremities:   Wearing SCD.      Assessment/Plan     ASSESSMENT:      Cerebrovascular accident (CVA) (CMS/[facility])    Still having issues with regulation of patient's antiplatelet dosing.  P2 Y 12 remains low at 21.  No evidence of hemorrhage on MRI.  Evolving left MCA distribution infarct.  Requiring heavy doses of sedation due to agitation and spikes in blood pressure.  A-line is not reliable.  Okay for DC.  Repeat P2Y12 at noon and consider initiating 60 mg Brilinta tonight if level between  with repeat level in a.m.      PLAN:   Hold Brilinta this AM  Repeat P2Y12 noon-further Brilinta dosing based on this result.  Otherwise stroke management work-up per neurology.  Wean sedation as tolerated    I discussed the patient's findings and my recommendations with patient, nursing staff and Dr. Washington      LOS: 3 days       GUERA Le  3/10/2021  10:23 EST    \"Dictated utilizing Dragon dictation\".      "

## 2021-03-10 NOTE — PLAN OF CARE
Goal Outcome Evaluation:        Outcome Summary: When patient wakes up, he tries continuously to climb out of bed, SBP up to 235, PATEL equally and spontaneously, will track with L gaze and will not cross midline. Precedex max, unable to further wean propofol with frequent fentanyl pushes. This am 1 episode of tube feed emesis, Dr. Spangler notified, order to hold TF and given zofran PRN. Residual at this time 10. AM labs pending.

## 2021-03-10 NOTE — PROGRESS NOTES
[facility] Acute Inpt Rehab Note     Received consult request for acute inpt rehab.  Note patient is on vent and increased agitation with any lightened sedation.  No therapies yet.  Will continue to follow patient progress and evaluate when patient starts participating in therapies and more medically stable.      Thanks,   Cecelia Freed RN  Rehab Admission Nurse   [phone]

## 2021-03-10 NOTE — PROGRESS NOTES
"  Daily Progress Note.   [facility] INTENSIVE CARE  3/10/2021    Patient:  Name:  Jose R Dalton  MRN:  [id number]  1960  60 y.o.  male         CC/Interval History:  [facility]  Agitated when sedation lightened at all - very high sedative requirements and sbp and agitation severe when attempts to lighten occur  TFs on hold with emesis overnight reported zofran prn given  No fever  Physical Exam:  /81   Pulse 76   Temp 98.8 °F (37.1 °C) (Oral)   Resp 22   Ht 185.4 cm (73\")   Wt 113 kg (249 lb 5.4 oz)   SpO2 99%   BMI 32.90 kg/m²   Body mass index is 32.9 kg/m².    Intake/Output Summary (Last 24 hours) at 3/10/2021 0813  Last data filed at 3/10/2021 0405  Gross per 24 hour   Intake 3786 ml   Output 3800 ml   Net -14 ml   Vent Settings          Resp Rate (Set): 22  Pressure Support (cm H2O): 8 cm H20  FiO2 (%): 21 %  PEEP/CPAP (cm H2O): 5 cm H20    Minute Ventilation (L/min) (Obs): 11.9 L/min  Resp Rate (Observed) Vent: 23  I:E Ratio (Set): 1:4.36  I:E Ratio (Obs): 3.70:1    PIP Observed (cm H2O): 25 cm H2O  Plateau Pressure (cm H2O): 0 cm H2O  propofol  precedex  Prn fentanyl  cardene    General appearance: Nontoxic on mechanical ventilation sedated  Eyes: anicteric sclerae, moist conjunctivae;  HENT: Atraumatic; ET tube  Neck: Trachea midline  Lungs: Mechanical bilateral breath sounds coarse no wheeze, with normal respiratory effort and no intercostal retractions  CV: Distant no rub  Abdomen: Obese soft bowel sounds present  Extremities: No peripheral edema or extremity lymphadenopathy  Skin: Warm well perfused no diffuse rash  Psych/neuro moves extremities wont follow commands but appears agitated     Data Review:  Notable Labs:  Results from last 7 days   Lab Units 03/10/21  0427 03/09/21  0321 03/08/21  1001 03/08/21  0317 03/07/21  1105   WBC 10*3/mm3 12.47* 16.21* 17.64* 13.38* 5.94   HEMOGLOBIN g/dL 11.7* 12.1* 12.5* 12.2* 11.6*   PLATELETS 10*3/mm3 199 220 " 212 222 190     Results from last 7 days   Lab Units 03/10/21  0427 03/09/21  0321 03/08/21  0317 03/07/21  1106 03/07/21  1045   SODIUM mmol/L 141 142 139 141  --    POTASSIUM mmol/L 3.8 4.0 4.4 4.0  --    CHLORIDE mmol/L 107 109* 109* 107  --    CO2 mmol/L 25.3 21.5* 15.9* 25.9  --    BUN mg/dL 18 17 16 12  --    CREATININE mg/dL 0.97 1.37* 1.40* 0.99 1.00   GLUCOSE mg/dL 164* 134* 143* 107*  --    CALCIUM mg/dL 8.3* 8.1* 7.8* 8.2*  --    Estimated Creatinine Clearance: 108 mL/min (by C-G formula based on SCr of 0.97 mg/dL).    Results from last 7 days   Lab Units 03/10/21  0427 03/09/21  0321 03/08/21  1001 03/08/21  0317   AST (SGOT) U/L 94* 32  --  12   ALT (SGPT) U/L 22 8  --  7   PLATELETS 10*3/mm3 199 220 212 222       Results from last 7 days   Lab Units 03/08/21  1121 03/07/21  1620   PH, ARTERIAL pH units 7.399 7.311*   PCO2, ARTERIAL mm Hg 32.3* 46.5*   PO2 ART mm Hg 67.9* 56.2*   HCO3 ART mmol/L 19.9* 23.4       Imaging:  Reviewed chest images personally from past 3 days    Ct chest - significant posterior opacities suggestive of asp pna given his history    Scheduled meds:    aspirin, 81 mg, Oral, Daily  atorvastatin, 80 mg, Oral, Nightly  chlorhexidine, 15 mL, Mouth/Throat, Q12H  hydrALAZINE, 100 mg, Nasogastric, Q8H  piperacillin-tazobactam, 3.375 g, Intravenous, Q8H  ticagrelor, 90 mg, Oral, BID        ASSESSMENT  /  PLAN:  Aspiration pneumonia  Acute stroke likely secondary to left carotid occlusion status post TPA and stenting  History of right MCA stroke previously  Aspiration pneumonia  Abnormal chest x-ray-question mediastinal mass or hilar mass send for CT chest  HTN  HLD  Seizure history  Smoker  H/o Polysubstance abuse  MARIO  Metabolic acidosis  Leukocytosis  Routine mgmt of [facility] ventilation     Zosyn cont asp pna    vent reviewed adjusted    Agitated with sedation wean - cont precedex on short term basis to assist with vent weaning.  Also prn fentanyl, propofol as needed.  Challenging, will  trial some seroquel if ecg qt interval allows - check ecg    Blood pressure control on Cardene.   Hydralazine 50mg TID increased 100 tid doing ewll unless becomes agitated  Add norvasc today if needing drip still    Total critical care time was 36minutes, excluding any separately billable procedure time.  Time did not overlap with any other provider.    Plan of care and patient course was reviewed with multidisciplinary team in morning rounds.      Deandre Rausch MD  [facility]  03/10/21  917a      ecg with qtc in normal range, start seroquel 50 bid  Deandre Rausch MD  [facility]  03/10/21  16:57 EST

## 2021-03-10 NOTE — PROGRESS NOTES
Adult Nutrition  Assessment/PES    Patient Name:  Jose R Dalton  YOB: 1960  MRN: [id number]  Admit Date:  3/7/2021    Assessment Date:  3/10/2021    Comments:  Nutrition support follow up. Peptamen AF at goal of 55cc/hr with min free water 30cc q 4 hrs.  Small amount of emesis noted, received zofran,TF held but now resumed and at goal. No BM yet.  Failed vent weaning yesterday.  Labs reviewed. Will continue to follow clinical course, nutrition support needs.    Reason for Assessment     Row Name 03/10/21 0837          Reason for Assessment    Reason For Assessment  follow-up protocol;TF/PN         Nutrition/Diet History     Row Name 03/10/21 0839          Nutrition/Diet History    Factors Affecting Nutritional Intake  difficulty/impaired swallowing;impaired cognitive status/motor control;compromised airway         Anthropometrics     Row Name 03/10/21 0405          Anthropometrics    Weight  113 kg (249 lb 5.4 oz)         Labs/Tests/Procedures/Meds     Row Name 03/10/21 0839          Labs/Procedures/Meds    Lab Results Reviewed  reviewed, pertinent     Lab Results Comments  Glu,h/h, wbc        Diagnostic Tests/Procedures    Diagnostic Test/Procedure Reviewed  reviewed, pertinent     Diagnostic Test/Procedures Comments  MRI brain        Medications    Pertinent Medications Reviewed  reviewed, pertinent     Pertinent Medications Comments  asa, lipitor, zosyn, brilinta, precedex, fentanyl, propofol         Physical Findings     Row Name 03/10/21 0842          Physical Findings    Overall Physical Appearance  obese;on ventilator support     Gastrointestinal  feeding tube     Tubes  nasogastric tube     Skin  other (see comments) B=14           Nutrition Prescription Ordered     Row Name 03/10/21 0843          Nutrition Prescription PO    Current PO Diet  NPO        Nutrition Prescription EN    Enteral Route  NG     Product  Peptamen AF (Vital AF 1.2)     TF Delivery Method  Continuous     Continuous TF  Goal Rate (mL/hr)  55 mL/hr     Continuous TF Current Rate (mL/hr)  55 mL/hr     Water flush (mL)   30 mL     Water Flush Frequency  Every 4 hours        Propofol Considerations    Propofol (mL/hr)  19.1 mL/hr     Propofol (Kcal/day)  504.24 Kcal/day         Evaluation of Received Nutrient/Fluid Intake     Row Name 03/10/21 0846          Calories Evaluation    Enteral Calories (kcal)  1584     Other Calories (kcal)  504     Total Calories (kcal)  2088     % of Kcal Needs  100        Protein Evaluation    Enteral Protein (gm)  100.32     % of Protein Needs  85        Intake Assessment    Energy/Calorie Requirement Assessment  meeting needs     Protein Requirement Assessment  not meeting needs     Fluid Requirement Assessment  meeting needs     Tolerance  tolerating        Fluid Intake Evaluation    Enteral (Free Water) Fluid (mL)  1078.92     Free Water Flush Fluid (mL)  180     Total Free Water Intake (mL)  1258.92 mL        EN Evaluation    TF Changes  Held;Rate increased held for short time with emesis     TF Residual  0-15     TF Tolerance  Vomiting small amount of emesis with suctioning/coughing     HOB  Greater than or equal to 30 degress         Problem/Interventions:    Intervention Goal     Row Name 03/10/21 0849          Intervention Goal    General  Maintain nutrition;Nutrition support treatment;Improved nutrition related lab(s);Reduce/improve symptoms;Meet nutritional needs for age/condition;Disease management/therapy     TF/PN  Maintain TF/PN;Tolerate TF at goal     Transition  TF to PO     Weight  No significant weight loss         Nutrition Intervention     Row Name 03/10/21 4621          Nutrition Intervention    RD/Tech Action  Care plan reviewd;Follow Tx progress;Await begin PO         Nutrition Prescription     Row Name 03/10/21 0848          Nutrition Prescription EN    Enteral Prescription  Continue same protocol         Education/Evaluation     Row Name 03/10/21 0836          Education     Education  Will Instruct as appropriate        Monitor/Evaluation    Monitor  Per protocol;Skin status;Symptoms;I&O;Pertinent labs;TF delivery/tolerance;Weight           Electronically signed by:  Callie Sheehan RD  03/10/21 08:49 EST

## 2021-03-11 ENCOUNTER — APPOINTMENT (OUTPATIENT)
Dept: GENERAL RADIOLOGY | Facility: HOSPITAL | Age: 61
End: 2021-03-11

## 2021-03-11 LAB
ALBUMIN SERPL-MCNC: 3.3 G/DL (ref 3.5–5.2)
ANION GAP SERPL CALCULATED.3IONS-SCNC: 11.2 MMOL/L (ref 5–15)
BASOPHILS # BLD AUTO: 0.04 10*3/MM3 (ref 0–0.2)
BASOPHILS NFR BLD AUTO: 0.4 % (ref 0–1.5)
BUN SERPL-MCNC: 24 MG/DL (ref 6–20)
BUN/CREAT SERPL: 22 (ref 7–25)
CALCIUM SPEC-SCNC: 8.6 MG/DL (ref 8.6–10.5)
CHLORIDE SERPL-SCNC: 109 MMOL/L (ref 98–107)
CO2 SERPL-SCNC: 22.8 MMOL/L (ref 22–29)
CREAT SERPL-MCNC: 1.09 MG/DL (ref 0.76–1.27)
DEPRECATED RDW RBC AUTO: 41.3 FL (ref 37–54)
EOSINOPHIL # BLD AUTO: 0.35 10*3/MM3 (ref 0–0.4)
EOSINOPHIL NFR BLD AUTO: 3.1 % (ref 0.3–6.2)
ERYTHROCYTE [DISTWIDTH] IN BLOOD BY AUTOMATED COUNT: 14.4 % (ref 12.3–15.4)
GFR SERPL CREATININE-BSD FRML MDRD: 84 ML/MIN/1.73
GLUCOSE BLDC GLUCOMTR-MCNC: 109 MG/DL (ref 70–130)
GLUCOSE BLDC GLUCOMTR-MCNC: 128 MG/DL (ref 70–130)
GLUCOSE BLDC GLUCOMTR-MCNC: 129 MG/DL (ref 70–130)
GLUCOSE BLDC GLUCOMTR-MCNC: 131 MG/DL (ref 70–130)
GLUCOSE SERPL-MCNC: 135 MG/DL (ref 65–99)
HCT VFR BLD AUTO: 36.5 % (ref 37.5–51)
HGB BLD-MCNC: 12.3 G/DL (ref 13–17.7)
IMM GRANULOCYTES # BLD AUTO: 0.05 10*3/MM3 (ref 0–0.05)
IMM GRANULOCYTES NFR BLD AUTO: 0.4 % (ref 0–0.5)
LYMPHOCYTES # BLD AUTO: 1.5 10*3/MM3 (ref 0.7–3.1)
LYMPHOCYTES NFR BLD AUTO: 13.3 % (ref 19.6–45.3)
MCH RBC QN AUTO: 26.7 PG (ref 26.6–33)
MCHC RBC AUTO-ENTMCNC: 33.7 G/DL (ref 31.5–35.7)
MCV RBC AUTO: 79.3 FL (ref 79–97)
MONOCYTES # BLD AUTO: 1.02 10*3/MM3 (ref 0.1–0.9)
MONOCYTES NFR BLD AUTO: 9 % (ref 5–12)
NEUTROPHILS NFR BLD AUTO: 73.8 % (ref 42.7–76)
NEUTROPHILS NFR BLD AUTO: 8.36 10*3/MM3 (ref 1.7–7)
NRBC BLD AUTO-RTO: 0 /100 WBC (ref 0–0.2)
PA ADP PRP-ACNC: 72 PRU (ref 194–418)
PHOSPHATE SERPL-MCNC: 3.4 MG/DL (ref 2.5–4.5)
PLATELET # BLD AUTO: 156 10*3/MM3 (ref 140–450)
PMV BLD AUTO: 11.5 FL (ref 6–12)
POTASSIUM SERPL-SCNC: 3.8 MMOL/L (ref 3.5–5.2)
RBC # BLD AUTO: 4.6 10*6/MM3 (ref 4.14–5.8)
SODIUM SERPL-SCNC: 143 MMOL/L (ref 136–145)
WBC # BLD AUTO: 11.32 10*3/MM3 (ref 3.4–10.8)

## 2021-03-11 PROCEDURE — 25010000002 FENTANYL CITRATE (PF) 100 MCG/2ML SOLUTION: Performed by: INTERNAL MEDICINE

## 2021-03-11 PROCEDURE — 99231 SBSQ HOSP IP/OBS SF/LOW 25: CPT | Performed by: PSYCHIATRY & NEUROLOGY

## 2021-03-11 PROCEDURE — 25010000002 PROPOFOL 10 MG/ML EMULSION: Performed by: INTERNAL MEDICINE

## 2021-03-11 PROCEDURE — 94799 UNLISTED PULMONARY SVC/PX: CPT

## 2021-03-11 PROCEDURE — 71045 X-RAY EXAM CHEST 1 VIEW: CPT

## 2021-03-11 PROCEDURE — 82962 GLUCOSE BLOOD TEST: CPT

## 2021-03-11 PROCEDURE — 80069 RENAL FUNCTION PANEL: CPT | Performed by: INTERNAL MEDICINE

## 2021-03-11 PROCEDURE — 85025 COMPLETE CBC W/AUTO DIFF WBC: CPT | Performed by: INTERNAL MEDICINE

## 2021-03-11 PROCEDURE — 25010000002 PIPERACILLIN SOD-TAZOBACTAM PER 1 G: Performed by: NEUROLOGICAL SURGERY

## 2021-03-11 PROCEDURE — 85576 BLOOD PLATELET AGGREGATION: CPT | Performed by: NURSE PRACTITIONER

## 2021-03-11 PROCEDURE — 99024 POSTOP FOLLOW-UP VISIT: CPT | Performed by: NURSE PRACTITIONER

## 2021-03-11 PROCEDURE — 94003 VENT MGMT INPAT SUBQ DAY: CPT

## 2021-03-11 RX ORDER — ASPIRIN 81 MG/1
81 TABLET, CHEWABLE ORAL DAILY
Status: DISCONTINUED | OUTPATIENT
Start: 2021-03-12 | End: 2021-03-16

## 2021-03-11 RX ORDER — QUETIAPINE FUMARATE 100 MG/1
100 TABLET, FILM COATED ORAL EVERY 12 HOURS SCHEDULED
Status: DISCONTINUED | OUTPATIENT
Start: 2021-03-11 | End: 2021-03-12

## 2021-03-11 RX ORDER — ATORVASTATIN CALCIUM 80 MG/1
80 TABLET, FILM COATED ORAL NIGHTLY
Status: DISCONTINUED | OUTPATIENT
Start: 2021-03-11 | End: 2021-03-16

## 2021-03-11 RX ADMIN — ASPIRIN 81 MG: 81 TABLET, CHEWABLE ORAL at 08:44

## 2021-03-11 RX ADMIN — PROPOFOL 20 MCG/KG/MIN: 10 INJECTION, EMULSION INTRAVENOUS at 10:31

## 2021-03-11 RX ADMIN — CHLORHEXIDINE GLUCONATE 15 ML: 1.2 RINSE ORAL at 20:18

## 2021-03-11 RX ADMIN — DEXMEDETOMIDINE HYDROCHLORIDE 0.8 MCG/KG/HR: 100 INJECTION, SOLUTION, CONCENTRATE INTRAVENOUS at 14:57

## 2021-03-11 RX ADMIN — PROPOFOL 30 MCG/KG/MIN: 10 INJECTION, EMULSION INTRAVENOUS at 02:46

## 2021-03-11 RX ADMIN — HYDRALAZINE HYDROCHLORIDE 100 MG: 50 TABLET, FILM COATED ORAL at 14:57

## 2021-03-11 RX ADMIN — TAZOBACTAM SODIUM AND PIPERACILLIN SODIUM 3.38 G: 375; 3 INJECTION, SOLUTION INTRAVENOUS at 22:11

## 2021-03-11 RX ADMIN — FENTANYL CITRATE 50 MCG: 50 INJECTION, SOLUTION INTRAMUSCULAR; INTRAVENOUS at 08:44

## 2021-03-11 RX ADMIN — HYDRALAZINE HYDROCHLORIDE 100 MG: 50 TABLET, FILM COATED ORAL at 06:27

## 2021-03-11 RX ADMIN — TAZOBACTAM SODIUM AND PIPERACILLIN SODIUM 3.38 G: 375; 3 INJECTION, SOLUTION INTRAVENOUS at 00:25

## 2021-03-11 RX ADMIN — PROPOFOL 25 MCG/KG/MIN: 10 INJECTION, EMULSION INTRAVENOUS at 23:17

## 2021-03-11 RX ADMIN — PROPOFOL 15 MCG/KG/MIN: 10 INJECTION, EMULSION INTRAVENOUS at 17:00

## 2021-03-11 RX ADMIN — HYDRALAZINE HYDROCHLORIDE 100 MG: 50 TABLET, FILM COATED ORAL at 22:11

## 2021-03-11 RX ADMIN — DEXMEDETOMIDINE HYDROCHLORIDE 1.5 MCG/KG/HR: 100 INJECTION, SOLUTION, CONCENTRATE INTRAVENOUS at 20:53

## 2021-03-11 RX ADMIN — DEXMEDETOMIDINE HYDROCHLORIDE 1.5 MCG/KG/HR: 100 INJECTION, SOLUTION, CONCENTRATE INTRAVENOUS at 18:33

## 2021-03-11 RX ADMIN — DEXMEDETOMIDINE HYDROCHLORIDE 1.2 MCG/KG/HR: 100 INJECTION, SOLUTION, CONCENTRATE INTRAVENOUS at 01:23

## 2021-03-11 RX ADMIN — FENTANYL CITRATE 50 MCG: 50 INJECTION, SOLUTION INTRAMUSCULAR; INTRAVENOUS at 00:33

## 2021-03-11 RX ADMIN — DEXMEDETOMIDINE HYDROCHLORIDE 1.5 MCG/KG/HR: 100 INJECTION, SOLUTION, CONCENTRATE INTRAVENOUS at 23:17

## 2021-03-11 RX ADMIN — DEXMEDETOMIDINE HYDROCHLORIDE 1.5 MCG/KG/HR: 100 INJECTION, SOLUTION, CONCENTRATE INTRAVENOUS at 08:44

## 2021-03-11 RX ADMIN — DEXMEDETOMIDINE HYDROCHLORIDE 1.5 MCG/KG/HR: 100 INJECTION, SOLUTION, CONCENTRATE INTRAVENOUS at 11:03

## 2021-03-11 RX ADMIN — DEXMEDETOMIDINE HYDROCHLORIDE 1 MCG/KG/HR: 100 INJECTION, SOLUTION, CONCENTRATE INTRAVENOUS at 04:25

## 2021-03-11 RX ADMIN — METOPROLOL TARTRATE 5 MG: 1 INJECTION, SOLUTION INTRAVENOUS at 17:11

## 2021-03-11 RX ADMIN — CHLORHEXIDINE GLUCONATE 15 ML: 1.2 RINSE ORAL at 08:44

## 2021-03-11 RX ADMIN — TAZOBACTAM SODIUM AND PIPERACILLIN SODIUM 3.38 G: 375; 3 INJECTION, SOLUTION INTRAVENOUS at 06:27

## 2021-03-11 RX ADMIN — QUETIAPINE FUMARATE 50 MG: 50 TABLET, FILM COATED ORAL at 08:44

## 2021-03-11 RX ADMIN — QUETIAPINE FUMARATE 100 MG: 100 TABLET ORAL at 20:19

## 2021-03-11 RX ADMIN — TICAGRELOR 90 MG: 90 TABLET ORAL at 20:19

## 2021-03-11 RX ADMIN — FENTANYL CITRATE 50 MCG: 50 INJECTION, SOLUTION INTRAMUSCULAR; INTRAVENOUS at 16:30

## 2021-03-11 RX ADMIN — TAZOBACTAM SODIUM AND PIPERACILLIN SODIUM 3.38 G: 375; 3 INJECTION, SOLUTION INTRAVENOUS at 14:57

## 2021-03-11 RX ADMIN — ATORVASTATIN CALCIUM 80 MG: 80 TABLET, FILM COATED ORAL at 20:19

## 2021-03-11 NOTE — PROGRESS NOTES
"  Daily Progress Note.   [facility] INTENSIVE CARE  3/11/2021    Patient:  Name:  Jose R Dalton  MRN:  [id number]  1960  60 y.o.  male         CC/Interval History:  Maintains on sedation mech vent  Still very difficult level of agitation and sedation to manage.  Seroquel added Precedex added propofol on board.  At present time he is just at a level where he he Slight interaction.  Daughter at bedside he is able to interact with her slightly however when he comes more alert he becomes very agitated and fights the ventilator.  Physical Exam:  /70   Pulse 70   Temp 99.3 °F (37.4 °C) (Oral)   Resp 25   Ht 185.4 cm (73\")   Wt 113 kg (249 lb 5.4 oz)   SpO2 98%   BMI 32.90 kg/m²   Body mass index is 32.9 kg/m².    Intake/Output Summary (Last 24 hours) at 3/11/2021 1535  Last data filed at 3/11/2021 0500  Gross per 24 hour   Intake 3086 ml   Output 1500 ml   Net 1586 ml   Vent Settings          Resp Rate (Set): 18  Pressure Support (cm H2O): 8 cm H20  FiO2 (%): 21 %  PEEP/CPAP (cm H2O): 5 cm H20    Minute Ventilation (L/min) (Obs): 21.4 L/min  Resp Rate (Observed) Vent: 26  I:E Ratio (Set): 1:0.31  I:E Ratio (Obs): 1:1.20    PIP Observed (cm H2O): 15 cm H2O  Plateau Pressure (cm H2O): 0 cm H2O  propofol  precedex  Prn fentanyl      General appearance: Nontoxic on mechanical ventilation sedated  Eyes: anicteric sclerae, moist conjunctivae;  HENT: Atraumatic; ET tube  Neck: Trachea midline  Lungs: Mechanical bilateral breath sounds coarse no wheeze, with normal respiratory effort and no intercostal retractions  CV: Distant no rub  Abdomen: Obese soft bowel sounds present  Extremities: No peripheral edema or extremity lymphadenopathy  Skin: Warm well perfused no diffuse rash  Psych/neuro moves extremities wont follow commands but appears agitated     Data Review:  Notable Labs:  Results from last 7 days   Lab Units 03/11/21  0740 03/10/21  0427 03/09/21  0321 03/08/21  1001 03/08/21  0317 " 03/07/21  1105   WBC 10*3/mm3 11.32* 12.47* 16.21* 17.64* 13.38* 5.94   HEMOGLOBIN g/dL 12.3* 11.7* 12.1* 12.5* 12.2* 11.6*   PLATELETS 10*3/mm3 156 199 220 212 222 190     Results from last 7 days   Lab Units 03/11/21  0740 03/10/21  0427 03/09/21  0321 03/08/21  0317 03/07/21  1106 03/07/21  1045   SODIUM mmol/L 143 141 142 139 141  --    POTASSIUM mmol/L 3.8 3.8 4.0 4.4 4.0  --    CHLORIDE mmol/L 109* 107 109* 109* 107  --    CO2 mmol/L 22.8 25.3 21.5* 15.9* 25.9  --    BUN mg/dL 24* 18 17 16 12  --    CREATININE mg/dL 1.09 0.97 1.37* 1.40* 0.99 1.00   GLUCOSE mg/dL 135* 164* 134* 143* 107*  --    CALCIUM mg/dL 8.6 8.3* 8.1* 7.8* 8.2*  --    PHOSPHORUS mg/dL 3.4  --   --   --   --   --    Estimated Creatinine Clearance: 96.1 mL/min (by C-G formula based on SCr of 1.09 mg/dL).    Results from last 7 days   Lab Units 03/11/21  0740 03/10/21  0427 03/09/21  0321 03/08/21  0317   AST (SGOT) U/L  --  94* 32 12   ALT (SGPT) U/L  --  22 8 7   PLATELETS 10*3/mm3 156 199 220 222       Results from last 7 days   Lab Units 03/08/21  1121 03/07/21  1620   PH, ARTERIAL pH units 7.399 7.311*   PCO2, ARTERIAL mm Hg 32.3* 46.5*   PO2 ART mm Hg 67.9* 56.2*   HCO3 ART mmol/L 19.9* 23.4       Imaging:  Reviewed chest images personally from past 3 days    Ct chest - significant posterior opacities suggestive of asp pna given his history    Scheduled meds:    aspirin, 81 mg, Oral, Daily  atorvastatin, 80 mg, Oral, Nightly  chlorhexidine, 15 mL, Mouth/Throat, Q12H  hydrALAZINE, 100 mg, Nasogastric, Q8H  piperacillin-tazobactam, 3.375 g, Intravenous, Q8H  QUEtiapine, 100 mg, Nasogastric, Q12H  ticagrelor, 90 mg, Oral, Daily        ASSESSMENT  /  PLAN:  Aspiration pneumonia  Acute stroke likely secondary to left carotid occlusion status post TPA and stenting  History of right MCA stroke previously  Aspiration pneumonia  Abnormal chest x-ray-question mediastinal mass or hilar mass send for CT chest  HTN  HLD  Seizure history  Smoker  H/o  Polysubstance abuse  MARIO  Metabolic acidosis improved  Leukocytosis  [facility]     Zosyn cont asp pna    vent reviewed adjusted    Agitated with sedation wean - cont precedex on short term basis to assist with vent weaning.  Also prn fentanyl, propofol as needed.  Very challenging at present time however seems to be at a great spot.  If we can maintain him on this we will perform an SBT.    Blood pressure controlled  Hydralazine 100 3 times daily    Updated daughter at bedside    Total critical care time was 32minutes, excluding any separately billable procedure time.  Time did not overlap with any other provider.    Plan of care and patient course was reviewed with multidisciplinary team in morning rounds.      Deandre Rausch MD  [facility]  03/11/21  3098

## 2021-03-11 NOTE — PROGRESS NOTES
[facility] NEUROSURGERY PROGRESS NOTE    PATIENT IDENTIFICATION:   Name:  Jose R Dalton      MRN:  [id number]     60 y.o.  male               CC: POD 4 left ICA thrombectomy, angioplasty, stent placement      Subjective     Interval History: Continues to require heavy sedation.  Nursing states that he will sit up and fight restraints and vent.  Nursing reports that he moves all extremities.    ROS:  Unable to assess due to intubation, sedation; agitated at times    Objective     Vital signs in last 24 hours:  Temp:  [98.9 °F (37.2 °C)-100.4 °F (38 °C)] 99.3 °F (37.4 °C)  Heart Rate:  [] 70  Resp:  [22-26] 25  BP: (108-165)/(66-89) 128/70  FiO2 (%):  [20 %-21 %] 20 %      Intake/Output this shift:  No intake/output data recorded.      Intake/Output last 3 shifts:  I/O last 3 completed shifts:  In: 4498 [I.V.:2518; Other:271; NG/GT:1659; IV Piggyback:50]  Out: 3500 [Urine:3500]    LABS:  Results from last 7 days   Lab Units 03/11/21  0740 03/10/21  0427 03/09/21  0321   WBC 10*3/mm3 11.32* 12.47* 16.21*   HEMOGLOBIN g/dL 12.3* 11.7* 12.1*   HEMATOCRIT % 36.5* 36.4* 37.0*   PLATELETS 10*3/mm3 156 199 220     Results from last 7 days   Lab Units 03/11/21  0740 03/10/21  0427 03/09/21  0321 03/08/21  0317   SODIUM mmol/L 143 141 142 139   POTASSIUM mmol/L 3.8 3.8 4.0 4.4   CHLORIDE mmol/L 109* 107 109* 109*   CO2 mmol/L 22.8 25.3 21.5* 15.9*   BUN mg/dL 24* 18 17 16   CREATININE mg/dL 1.09 0.97 1.37* 1.40*   CALCIUM mg/dL 8.6 8.3* 8.1* 7.8*   BILIRUBIN mg/dL  --  0.5 0.5 0.3   ALK PHOS U/L  --  46 46 46   ALT (SGPT) U/L  --  22 8 7   AST (SGOT) U/L  --  94* 32 12   GLUCOSE mg/dL 135* 164* 134* 143*      P2Y12 3/11= 72    IMAGING STUDIES:  No new imaging    I personally viewed and interpreted the patient's chart.      Meds reviewed/changed: Yes    Current Facility-Administered Medications:   •  aspirin chewable tablet 81 mg, 81 mg, Oral, Daily, Allie Montes, GUERA, 81 mg at 03/11/21 0844  •  atorvastatin  (LIPITOR) tablet 80 mg, 80 mg, Oral, Nightly, Nick Washington MD, 80 mg at 03/10/21 2113  •  chlorhexidine (PERIDEX) 0.12 % solution 15 mL, 15 mL, Mouth/Throat, Q12H, Deandre Rausch MD, 15 mL at 03/11/21 0844  •  dexmedetomidine (PRECEDEX) 400 mcg in 100 mL NS infusion kit, 0.2-1.5 mcg/kg/hr, Intravenous, Titrated, Deandre Rausch MD, Last Rate: 36.7 mL/hr at 03/11/21 1156, 1.3 mcg/kg/hr at 03/11/21 1156  •  enalaprilat (VASOTEC) injection 0.625 mg, 0.625 mg, Intravenous, Q6H PRN, Nick Washington MD, 0.625 mg at 03/09/21 2159  •  fentaNYL citrate (PF) (SUBLIMAZE) injection 50 mcg, 50 mcg, Intravenous, Q30 Min PRN, Deandre Rausch MD, 50 mcg at 03/11/21 0844  •  fentaNYL citrate (SUBLIMAZE) 1250 mcg in sodium chloride 0.9% 250 mL infusion, 75 mcg/hr, Intravenous, Continuous, Nick Washington MD  •  hydrALAZINE (APRESOLINE) injection 20 mg, 20 mg, Intravenous, Q4H PRN, Ever Longoria MD, 20 mg at 03/09/21 0141  •  hydrALAZINE (APRESOLINE) tablet 100 mg, 100 mg, Nasogastric, Q8H, Deandre Rausch MD, 100 mg at 03/11/21 0627  •  labetalol (NORMODYNE,TRANDATE) injection 20 mg, 20 mg, Intravenous, Q10 Min PRN, Nick Washington MD, 20 mg at 03/09/21 0238  •  metoprolol tartrate (LOPRESSOR) injection 5 mg, 5 mg, Intravenous, Q6H PRN, Ai Spangler MD, 5 mg at 03/10/21 0123  •  niCARdipine (CARDENE) 50 mg in sodium chloride 0.9 % 250 mL IVPB, 5-15 mg/hr, Intravenous, Titrated, Deandre Rausch MD, Stopped at 03/09/21 1514  •  ondansetron (ZOFRAN) injection 4 mg, 4 mg, Intravenous, Q6H PRN, Ai Spangler MD, 4 mg at 03/10/21 0512  •  piperacillin-tazobactam (ZOSYN) 3.375 g in iso-osmotic dextrose 50 ml (premix), 3.375 g, Intravenous, Q8H, Nick Washington MD, 3.375 g at 03/11/21 0627  •  propofol (DIPRIVAN) infusion 10 mg/mL 100 mL, 5-50 mcg/kg/min, Intravenous, Titrated, eDandre Rausch MD, Last Rate: 13.56 mL/hr at 03/11/21 1031, 20 mcg/kg/min at 03/11/21 1031  •   QUEtiapine (SEROquel) tablet 100 mg, 100 mg, Nasogastric, Q12H, Tony Fraser MD  •  sodium chloride 0.9 % flush 10 mL, 10 mL, Intravenous, PRN, Nick Washington MD  •  ticagrelor (BRILINTA) tablet 90 mg, 90 mg, Oral, Daily, Allie Montes, APRN      Physical Exam:    General:   Intubated and sedated.  Attempts to open eyes with verbal and tactile stimuli.  Moves all extremities some spontaneously but not command. Positive cough.  Patient overbreathing vent.  HEENT:    Orally intubated.  Nasogastric feeding tube in place  CN III IV VI: Pupils pinpoint..  Pupils midpoint.  CN VII: No obvious facial asymmetry, but equipment limits exam    Motor: Weak movements bilateral upper and lower extremities to painful stimuli.    Station and Gait: Unable to assess due to intubation and sedation  Coordination: Unable to assess as patient does not follow commands  Extremities:   Wearing SCD.      Assessment/Plan     ASSESSMENT:      Cerebrovascular accident (CVA) (CMS/[facility])    Patient neurologic status still waxing and waning.  Becomes agitated and restless moving everything and fighting vent and restraints at times.  Still requiring high doses of sedation. Wonder if there is underlying ETOH use that may be complicating neurologic status and ability to wean. I have a hard time getting him to move anything during my visits.  MRI shows old stroke on the right and scattered distribution of stroke on the left.  P2Y12 equal to 72 today.  Will resume Brilinta but only daily dosing.  Follow with repeat P2Y12 tomorrow with goal .  Not much else to offer from neurosurgical standpoint.  Neurology managing further stroke work-up and treatment.    PLAN:   Brilinta 90 mg daily.  Continue aspirin 81 mg.  Repeat P2Y12 a.m.  Otherwise stroke management work-up per neurology.  Wean sedation as tolerated.    Hopefully extubate soon.    I discussed the patient's findings and my recommendations with patient, nursing staff and   "Todnem      LOS: 4 days       Allie Montes, APRN  3/11/2021  13:32 EST    \"Dictated utilizing Dragon dictation\".      "

## 2021-03-11 NOTE — PROGRESS NOTES
Neurology Note    Patient:  Jose R Dalton    YOB: 1960    REFERRING PHYSICIAN:  Deandre Rausch MD    CHIEF COMPLAINT:    stroke    HISTORY OF PRESENT ILLNESS:   The patient is essentially unchanged. Remains agitated off IV sedation, moves limbs x 4, now on propofol, Precedex, prn fentanyl. No seizures.    Past Medical History:  Past Medical History:   Diagnosis Date   • Elevated cholesterol    • Hypertension    • Obesity    • Seizure (CMS/HCC)    • Seizures (CMS/HCC)    • Stroke (CMS/HCC)        Past Surgical History:  Past Surgical History:   Procedure Laterality Date   • CAROTID ENDARTERECTOMY Right    • CAROTID ENDARTERECTOMY Right    • EMBOLECTOMY N/A 3/7/2021    Procedure: MECHANICAL EMBOLECTOMY, WITH LEFT CAROTID ARTERY ANGIOPLASTY AND STENT PLACEMENT;  Surgeon: Nick Washington MD;  Location: [facility] 18/19;  Service: Neurosurgery;  Laterality: N/A;       Social History:   Social History     Socioeconomic History   • Marital status: Single     Spouse name: Not on file   • Number of children: Not on file   • Years of education: Not on file   • Highest education level: Not on file   Tobacco Use   • Smoking status: Current Every Day Smoker     Packs/day: 0.50   • Smokeless tobacco: Never Used   Substance and Sexual Activity   • Alcohol use: No   • Drug use: Yes     Types: Cocaine(coke), Marijuana     Comment: positive drug screen in 2016, current possession of marijuana 3/7/21   • Sexual activity: Defer        Family History:   History reviewed. No pertinent family history.    Medications Prior to Admission:    Prior to Admission medications    Medication Sig Start Date End Date Taking? Authorizing Provider   aspirin 325 MG tablet Take 325 mg by mouth daily.    Provider, MD David   hydrOXYzine (ATARAX) 25 MG tablet Take 1 tablet by mouth every 6 (six) hours as needed for itching. 7/29/16   Fly Appiah MD   MethylPREDNISolone (MEDROL, WAYNE,) 4 MG tablet Take as directed  on package instructions. 7/29/16   Fly Appiah MD       Allergies:  Patient has no known allergies.      Review of system  Review of Systems   Unable to perform ROS: Intubated       Vitals:    03/11/21 0838   BP:    Pulse:    Resp:    Temp: 100.4 °F (38 °C)   SpO2:        Physical exam  Physical Exam  Cardiovascular:      Rate and Rhythm: Normal rate and regular rhythm.   Pulmonary:      Comments: intubated  Neurological:      Comments: Intubated, sedated, does not react to voice or touch, tone flaccid.           Lab Results   Component Value Date    WBC 11.32 (H) 03/11/2021    HGB 12.3 (L) 03/11/2021    HCT 36.5 (L) 03/11/2021    MCV 79.3 03/11/2021     03/11/2021     Lab Results   Component Value Date    GLUCOSE 135 (H) 03/11/2021    BUN 24 (H) 03/11/2021    CREATININE 1.09 03/11/2021    EGFRIFAFRI 84 03/11/2021    BCR 22.0 03/11/2021    CO2 22.8 03/11/2021    CALCIUM 8.6 03/11/2021    ALBUMIN 3.30 (L) 03/11/2021    AST 94 (H) 03/10/2021    ALT 22 03/10/2021             During this visit the following were done:  Labs Reviewed [x]    Labs Ordered []    Radiology Reports Reviewed [x]    Radiology Ordered []    EKG, echo, and/or stress test reviewed []    EEG results reviewed  []    EEG reviewed and interpreted per myself   []    Discussed case with neurointerventionalist or neuroradiologist []    Referring Provider Records Reviewed []    ER Records Reviewed []    Hospital Records Reviewed []    History Obtained From Family []    Radiological images view and Interpreted per myself []    Case Discussed with referring provider []     Decision to obtain and request outside records  []        Assessment and Plan     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 Sedated exam. MRI with evolving sizable scattered LMCA territory infarcts, large old RMCA stroke. Restlessness/agitation requiring heavy sedation.   - ICU care.   - Wean sedation and vent as tolerated.   - Increase Seroquel to 100 mg bid for  agitation.   - Continue DAPT with aspirin and Brilinta.   - high dose statin.   - SBP<140.   - ST, PT, OT.                 Electronically signed by Tony Fraser MD on 3/11/2021 at 10:13 EST

## 2021-03-11 NOTE — PLAN OF CARE
Goal Outcome Evaluation:         Patient remains in ICU on the vent, sedated with propofol and precedex, as well as frequent fentanyl pushes. Propofol weaned from 25 to 15 this shift, precedex weaned from 1.3 to 0.9 this shift. Patient very agitated when awake, currently resting comfortably. No significant neuro changes over night. Occasional pressure increase when restless, then comes back down on its own. All other VSS. Will continue to monitor.

## 2021-03-12 ENCOUNTER — APPOINTMENT (OUTPATIENT)
Dept: GENERAL RADIOLOGY | Facility: HOSPITAL | Age: 61
End: 2021-03-12

## 2021-03-12 LAB
ALBUMIN SERPL-MCNC: 3.3 G/DL (ref 3.5–5.2)
ANION GAP SERPL CALCULATED.3IONS-SCNC: 10.5 MMOL/L (ref 5–15)
ARTERIAL PATENCY WRIST A: POSITIVE
ARTERIAL PATENCY WRIST A: POSITIVE
ATMOSPHERIC PRESS: 755 MMHG
ATMOSPHERIC PRESS: 759.2 MMHG
BACTERIA SPEC AEROBE CULT: NORMAL
BACTERIA SPEC AEROBE CULT: NORMAL
BASE EXCESS BLDA CALC-SCNC: 0.7 MMOL/L (ref 0–2)
BASE EXCESS BLDA CALC-SCNC: 3 MMOL/L (ref 0–2)
BASOPHILS # BLD AUTO: 0.03 10*3/MM3 (ref 0–0.2)
BASOPHILS NFR BLD AUTO: 0.3 % (ref 0–1.5)
BDY SITE: ABNORMAL
BDY SITE: ABNORMAL
BUN SERPL-MCNC: 25 MG/DL (ref 6–20)
BUN/CREAT SERPL: 25.5 (ref 7–25)
CALCIUM SPEC-SCNC: 8.5 MG/DL (ref 8.6–10.5)
CHLORIDE SERPL-SCNC: 111 MMOL/L (ref 98–107)
CO2 SERPL-SCNC: 23.5 MMOL/L (ref 22–29)
CREAT SERPL-MCNC: 0.98 MG/DL (ref 0.76–1.27)
DEPRECATED RDW RBC AUTO: 41.6 FL (ref 37–54)
EOSINOPHIL # BLD AUTO: 0.34 10*3/MM3 (ref 0–0.4)
EOSINOPHIL NFR BLD AUTO: 3.3 % (ref 0.3–6.2)
ERYTHROCYTE [DISTWIDTH] IN BLOOD BY AUTOMATED COUNT: 14.3 % (ref 12.3–15.4)
GFR SERPL CREATININE-BSD FRML MDRD: 95 ML/MIN/1.73
GLUCOSE BLDC GLUCOMTR-MCNC: 111 MG/DL (ref 70–130)
GLUCOSE BLDC GLUCOMTR-MCNC: 134 MG/DL (ref 70–130)
GLUCOSE BLDC GLUCOMTR-MCNC: 149 MG/DL (ref 70–130)
GLUCOSE SERPL-MCNC: 146 MG/DL (ref 65–99)
HCO3 BLDA-SCNC: 24.2 MMOL/L (ref 22–28)
HCO3 BLDA-SCNC: 25.3 MMOL/L (ref 22–28)
HCT VFR BLD AUTO: 34.3 % (ref 37.5–51)
HGB BLD-MCNC: 11.1 G/DL (ref 13–17.7)
IMM GRANULOCYTES # BLD AUTO: 0.06 10*3/MM3 (ref 0–0.05)
IMM GRANULOCYTES NFR BLD AUTO: 0.6 % (ref 0–0.5)
INHALED O2 CONCENTRATION: 21 %
INHALED O2 CONCENTRATION: 21 %
LYMPHOCYTES # BLD AUTO: 1.02 10*3/MM3 (ref 0.7–3.1)
LYMPHOCYTES NFR BLD AUTO: 9.9 % (ref 19.6–45.3)
MCH RBC QN AUTO: 25.8 PG (ref 26.6–33)
MCHC RBC AUTO-ENTMCNC: 32.4 G/DL (ref 31.5–35.7)
MCV RBC AUTO: 79.6 FL (ref 79–97)
MODALITY: ABNORMAL
MODALITY: ABNORMAL
MONOCYTES # BLD AUTO: 1.03 10*3/MM3 (ref 0.1–0.9)
MONOCYTES NFR BLD AUTO: 10 % (ref 5–12)
NEUTROPHILS NFR BLD AUTO: 7.83 10*3/MM3 (ref 1.7–7)
NEUTROPHILS NFR BLD AUTO: 75.9 % (ref 42.7–76)
NRBC BLD AUTO-RTO: 0 /100 WBC (ref 0–0.2)
O2 A-A PPRESDIFF RESPIRATORY: 0.7 MMHG
O2 A-A PPRESDIFF RESPIRATORY: 0.8 MMHG
PA ADP PRP-ACNC: 63 PRU (ref 194–418)
PCO2 BLDA: 30.7 MM HG (ref 35–45)
PCO2 BLDA: 34.1 MM HG (ref 35–45)
PEEP RESPIRATORY: 2 CM[H2O]
PEEP RESPIRATORY: 5 CM[H2O]
PH BLDA: 7.46 PH UNITS (ref 7.35–7.45)
PH BLDA: 7.53 PH UNITS (ref 7.35–7.45)
PHOSPHATE SERPL-MCNC: 3.6 MG/DL (ref 2.5–4.5)
PLATELET # BLD AUTO: 167 10*3/MM3 (ref 140–450)
PMV BLD AUTO: 11.8 FL (ref 6–12)
PO2 BLDA: 77.7 MM HG (ref 80–100)
PO2 BLDA: 91.6 MM HG (ref 80–100)
POTASSIUM SERPL-SCNC: 3.7 MMOL/L (ref 3.5–5.2)
PSV: 8 CMH2O
RBC # BLD AUTO: 4.31 10*6/MM3 (ref 4.14–5.8)
SAO2 % BLDCOA: 96.2 % (ref 92–99)
SAO2 % BLDCOA: 98 % (ref 92–99)
SET MECH RESP RATE: 18
SODIUM SERPL-SCNC: 145 MMOL/L (ref 136–145)
TOTAL RATE: 21 BREATHS/MINUTE
TOTAL RATE: 22 BREATHS/MINUTE
TRIGL SERPL-MCNC: 117 MG/DL (ref 0–150)
VENTILATOR MODE: ABNORMAL
VENTILATOR MODE: ABNORMAL
WBC # BLD AUTO: 10.31 10*3/MM3 (ref 3.4–10.8)

## 2021-03-12 PROCEDURE — 84478 ASSAY OF TRIGLYCERIDES: CPT | Performed by: INTERNAL MEDICINE

## 2021-03-12 PROCEDURE — 82803 BLOOD GASES ANY COMBINATION: CPT

## 2021-03-12 PROCEDURE — 25010000002 LORAZEPAM PER 2 MG: Performed by: INTERNAL MEDICINE

## 2021-03-12 PROCEDURE — 80069 RENAL FUNCTION PANEL: CPT | Performed by: INTERNAL MEDICINE

## 2021-03-12 PROCEDURE — 94799 UNLISTED PULMONARY SVC/PX: CPT

## 2021-03-12 PROCEDURE — 25010000002 PIPERACILLIN SOD-TAZOBACTAM PER 1 G: Performed by: NEUROLOGICAL SURGERY

## 2021-03-12 PROCEDURE — 85025 COMPLETE CBC W/AUTO DIFF WBC: CPT | Performed by: INTERNAL MEDICINE

## 2021-03-12 PROCEDURE — 82962 GLUCOSE BLOOD TEST: CPT

## 2021-03-12 PROCEDURE — 85576 BLOOD PLATELET AGGREGATION: CPT | Performed by: NURSE PRACTITIONER

## 2021-03-12 PROCEDURE — 99024 POSTOP FOLLOW-UP VISIT: CPT | Performed by: NURSE PRACTITIONER

## 2021-03-12 PROCEDURE — 25010000002 HYDRALAZINE PER 20 MG: Performed by: PSYCHIATRY & NEUROLOGY

## 2021-03-12 PROCEDURE — 25010000002 DEXAMETHASONE PER 1 MG: Performed by: INTERNAL MEDICINE

## 2021-03-12 PROCEDURE — 71045 X-RAY EXAM CHEST 1 VIEW: CPT

## 2021-03-12 PROCEDURE — 36600 WITHDRAWAL OF ARTERIAL BLOOD: CPT

## 2021-03-12 PROCEDURE — 25010000002 PROPOFOL 10 MG/ML EMULSION: Performed by: INTERNAL MEDICINE

## 2021-03-12 PROCEDURE — 94003 VENT MGMT INPAT SUBQ DAY: CPT

## 2021-03-12 PROCEDURE — 94640 AIRWAY INHALATION TREATMENT: CPT

## 2021-03-12 PROCEDURE — 99232 SBSQ HOSP IP/OBS MODERATE 35: CPT | Performed by: PSYCHIATRY & NEUROLOGY

## 2021-03-12 RX ORDER — DILTIAZEM HCL IN NACL,ISO-OSM 125 MG/125
5-15 PLASTIC BAG, INJECTION (ML) INTRAVENOUS
Status: DISCONTINUED | OUTPATIENT
Start: 2021-03-12 | End: 2021-03-14

## 2021-03-12 RX ORDER — QUETIAPINE FUMARATE 50 MG/1
50 TABLET, FILM COATED ORAL EVERY 12 HOURS SCHEDULED
Status: DISCONTINUED | OUTPATIENT
Start: 2021-03-12 | End: 2021-03-16

## 2021-03-12 RX ORDER — DEXAMETHASONE SODIUM PHOSPHATE 4 MG/ML
4 INJECTION, SOLUTION INTRA-ARTICULAR; INTRALESIONAL; INTRAMUSCULAR; INTRAVENOUS; SOFT TISSUE EVERY 8 HOURS
Status: COMPLETED | OUTPATIENT
Start: 2021-03-12 | End: 2021-03-13

## 2021-03-12 RX ORDER — LORAZEPAM 2 MG/ML
0.5 INJECTION INTRAMUSCULAR ONCE
Status: COMPLETED | OUTPATIENT
Start: 2021-03-12 | End: 2021-03-12

## 2021-03-12 RX ORDER — SODIUM CHLORIDE FOR INHALATION 0.9 %
3 VIAL, NEBULIZER (ML) INHALATION
Status: DISCONTINUED | OUTPATIENT
Start: 2021-03-12 | End: 2021-03-14

## 2021-03-12 RX ADMIN — LABETALOL HYDROCHLORIDE 20 MG: 5 INJECTION, SOLUTION INTRAVENOUS at 15:19

## 2021-03-12 RX ADMIN — TAZOBACTAM SODIUM AND PIPERACILLIN SODIUM 3.38 G: 375; 3 INJECTION, SOLUTION INTRAVENOUS at 05:59

## 2021-03-12 RX ADMIN — PROPOFOL 25 MCG/KG/MIN: 10 INJECTION, EMULSION INTRAVENOUS at 04:22

## 2021-03-12 RX ADMIN — LORAZEPAM 0.5 MG: 2 INJECTION INTRAMUSCULAR; INTRAVENOUS at 14:33

## 2021-03-12 RX ADMIN — DEXMEDETOMIDINE HYDROCHLORIDE 1.5 MCG/KG/HR: 100 INJECTION, SOLUTION, CONCENTRATE INTRAVENOUS at 04:22

## 2021-03-12 RX ADMIN — ISODIUM CHLORIDE 3 ML: 0.03 SOLUTION RESPIRATORY (INHALATION) at 14:39

## 2021-03-12 RX ADMIN — TICAGRELOR 90 MG: 90 TABLET ORAL at 09:10

## 2021-03-12 RX ADMIN — LABETALOL HYDROCHLORIDE 20 MG: 5 INJECTION, SOLUTION INTRAVENOUS at 23:59

## 2021-03-12 RX ADMIN — CHLORHEXIDINE GLUCONATE 15 ML: 1.2 RINSE ORAL at 20:34

## 2021-03-12 RX ADMIN — QUETIAPINE FUMARATE 100 MG: 100 TABLET ORAL at 09:10

## 2021-03-12 RX ADMIN — HYDRALAZINE HYDROCHLORIDE 100 MG: 50 TABLET, FILM COATED ORAL at 05:59

## 2021-03-12 RX ADMIN — SODIUM CHLORIDE 5 MG/HR: 9 INJECTION, SOLUTION INTRAVENOUS at 21:59

## 2021-03-12 RX ADMIN — DEXMEDETOMIDINE HYDROCHLORIDE 1.5 MCG/KG/HR: 100 INJECTION, SOLUTION, CONCENTRATE INTRAVENOUS at 08:58

## 2021-03-12 RX ADMIN — DEXMEDETOMIDINE HYDROCHLORIDE 1.5 MCG/KG/HR: 100 INJECTION, SOLUTION, CONCENTRATE INTRAVENOUS at 06:33

## 2021-03-12 RX ADMIN — TAZOBACTAM SODIUM AND PIPERACILLIN SODIUM 3.38 G: 375; 3 INJECTION, SOLUTION INTRAVENOUS at 14:33

## 2021-03-12 RX ADMIN — METOPROLOL TARTRATE 5 MG: 1 INJECTION, SOLUTION INTRAVENOUS at 13:03

## 2021-03-12 RX ADMIN — DEXAMETHASONE SODIUM PHOSPHATE 4 MG: 4 INJECTION, SOLUTION INTRAMUSCULAR; INTRAVENOUS at 14:36

## 2021-03-12 RX ADMIN — ASPIRIN 81 MG: 81 TABLET, CHEWABLE ORAL at 09:10

## 2021-03-12 RX ADMIN — HYDRALAZINE HYDROCHLORIDE 20 MG: 20 INJECTION, SOLUTION INTRAMUSCULAR; INTRAVENOUS at 20:31

## 2021-03-12 RX ADMIN — Medication 5 MG/HR: at 17:16

## 2021-03-12 RX ADMIN — DEXMEDETOMIDINE HYDROCHLORIDE 1.5 MCG/KG/HR: 100 INJECTION, SOLUTION, CONCENTRATE INTRAVENOUS at 10:57

## 2021-03-12 RX ADMIN — LABETALOL HYDROCHLORIDE 20 MG: 5 INJECTION, SOLUTION INTRAVENOUS at 15:58

## 2021-03-12 RX ADMIN — DEXMEDETOMIDINE HYDROCHLORIDE 1.5 MCG/KG/HR: 100 INJECTION, SOLUTION, CONCENTRATE INTRAVENOUS at 02:01

## 2021-03-12 RX ADMIN — HYDRALAZINE HYDROCHLORIDE 20 MG: 20 INJECTION, SOLUTION INTRAMUSCULAR; INTRAVENOUS at 11:39

## 2021-03-12 RX ADMIN — LABETALOL HYDROCHLORIDE 20 MG: 5 INJECTION, SOLUTION INTRAVENOUS at 23:45

## 2021-03-12 RX ADMIN — CHLORHEXIDINE GLUCONATE 15 ML: 1.2 RINSE ORAL at 09:00

## 2021-03-12 RX ADMIN — RACEPINEPHRINE HYDROCHLORIDE 0.5 ML: 11.25 SOLUTION RESPIRATORY (INHALATION) at 14:39

## 2021-03-12 RX ADMIN — TAZOBACTAM SODIUM AND PIPERACILLIN SODIUM 3.38 G: 375; 3 INJECTION, SOLUTION INTRAVENOUS at 23:29

## 2021-03-12 RX ADMIN — ENALAPRILAT 0.62 MG: 2.5 INJECTION INTRAVENOUS at 14:10

## 2021-03-12 RX ADMIN — DEXMEDETOMIDINE HYDROCHLORIDE 0.2 MCG/KG/HR: 100 INJECTION, SOLUTION, CONCENTRATE INTRAVENOUS at 20:13

## 2021-03-12 RX ADMIN — DEXAMETHASONE SODIUM PHOSPHATE 4 MG: 4 INJECTION, SOLUTION INTRAMUSCULAR; INTRAVENOUS at 23:29

## 2021-03-12 NOTE — PROGRESS NOTES
Continued Stay Note  [facility]     Patient Name: Jose R Dalton  MRN: [id number]  Today's Date: 3/12/2021    Admit Date: 3/7/2021    Discharge Plan     Row Name 03/12/21 1523       Plan    Plan  Discharge plans pending clinical course.    Plan Comments  Pt extubated  Following for discharge needs        Discharge Codes    No documentation.             Irena Lorenzo RN

## 2021-03-12 NOTE — PROGRESS NOTES
Neurology Note    Patient:  Jose R Dalton    YOB: 1960    REFERRING PHYSICIAN:  Deandre Rausch MD    CHIEF COMPLAINT:    stroke    HISTORY OF PRESENT ILLNESS:   The patient has been moving all limbs, following commands some, agitation is perhaps a bit better, hoping for extubation today.    Past Medical History:  Past Medical History:   Diagnosis Date   • Elevated cholesterol    • Hypertension    • Obesity    • Seizure (CMS/HCC)    • Seizures (CMS/HCC)    • Stroke (CMS/HCC)        Past Surgical History:  Past Surgical History:   Procedure Laterality Date   • CAROTID ENDARTERECTOMY Right    • CAROTID ENDARTERECTOMY Right    • EMBOLECTOMY N/A 3/7/2021    Procedure: MECHANICAL EMBOLECTOMY, WITH LEFT CAROTID ARTERY ANGIOPLASTY AND STENT PLACEMENT;  Surgeon: Nick Washington MD;  Location: [facility] 18/19;  Service: Neurosurgery;  Laterality: N/A;       Social History:   Social History     Socioeconomic History   • Marital status: Single     Spouse name: Not on file   • Number of children: Not on file   • Years of education: Not on file   • Highest education level: Not on file   Tobacco Use   • Smoking status: Current Every Day Smoker     Packs/day: 0.50   • Smokeless tobacco: Never Used   Substance and Sexual Activity   • Alcohol use: No   • Drug use: Yes     Types: Cocaine(coke), Marijuana     Comment: positive drug screen in 2016, current possession of marijuana 3/7/21   • Sexual activity: Defer        Family History:   History reviewed. No pertinent family history.    Medications Prior to Admission:    Prior to Admission medications    Medication Sig Start Date End Date Taking? Authorizing Provider   aspirin 325 MG tablet Take 325 mg by mouth daily.    Provider, MD David   hydrOXYzine (ATARAX) 25 MG tablet Take 1 tablet by mouth every 6 (six) hours as needed for itching. 7/29/16   Fly Appiah MD   MethylPREDNISolone (MEDROL, WAYNE,) 4 MG tablet Take as directed on package  instructions. 7/29/16   Fly Appiah MD       Allergies:  Patient has no known allergies.      Review of system  Review of Systems   Unable to perform ROS: Intubated       Vitals:    03/12/21 1139   BP: 152/82   Pulse:    Resp:    Temp:    SpO2:        Physical exam  Physical Exam  Constitutional:       Interventions: He is intubated.   Cardiovascular:      Rate and Rhythm: Normal rate and regular rhythm.   Pulmonary:      Effort: He is intubated.   Neurological:      Comments: Sedated, opens eyes some and move limbs spontaneously           Lab Results   Component Value Date    WBC 10.31 03/12/2021    HGB 11.1 (L) 03/12/2021    HCT 34.3 (L) 03/12/2021    MCV 79.6 03/12/2021     03/12/2021     Lab Results   Component Value Date    GLUCOSE 146 (H) 03/12/2021    BUN 25 (H) 03/12/2021    CREATININE 0.98 03/12/2021    EGFRIFAFRI 95 03/12/2021    BCR 25.5 (H) 03/12/2021    CO2 23.5 03/12/2021    CALCIUM 8.5 (L) 03/12/2021    ALBUMIN 3.30 (L) 03/12/2021    AST 94 (H) 03/10/2021    ALT 22 03/10/2021             During this visit the following were done:  Labs Reviewed [x]    Labs Ordered []    Radiology Reports Reviewed []    Radiology Ordered []    EKG, echo, and/or stress test reviewed []    EEG results reviewed  []    EEG reviewed and interpreted per myself   []    Discussed case with neurointerventionalist or neuroradiologist []    Referring Provider Records Reviewed []    ER Records Reviewed []    Hospital Records Reviewed []    History Obtained From Family []    Radiological images view and Interpreted per myself []    Case Discussed with referring provider []     Decision to obtain and request outside records  []        Assessment and Plan     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 Sedated exam. MRI with evolving sizable scattered LMCA territory infarcts, large old RMCA stroke. Restlessness/agitation requiring heavy sedation.   - ICU care.   - Wean sedation and vent as tolerated.   - Continue  Seroquel 100 mg bid for agitation.   - Continue DAPT with aspirin and Brilinta.   - high dose statin.   - SBP<140.   - ST, PT, OT.      Electronically signed by Tony Fraser MD on 3/12/2021 at 12:09 EST

## 2021-03-12 NOTE — PLAN OF CARE
Problem: Adult Inpatient Plan of Care  Goal: Plan of Care Review  Outcome: Ongoing, Progressing  Flowsheets (Taken 3/12/2021 4458)  Progress: improving  Plan of Care Reviewed With: patient  Outcome Summary: Weaned from sedation for assessment, patient follows commands, moves all extremities. Does get agitated. Vent at Fi02- 21%, peep- 5. SBP- 120's-130's. NSR. Maxed on precedex, trying to wean propofol. Daughter would like to be here for SBT this morning, patient is more calm when she is present

## 2021-03-12 NOTE — PROGRESS NOTES
"  Daily Progress Note.   [facility] INTENSIVE CARE  3/12/2021    Patient:  Name:  Jose R Dalton  MRN:  [id number]  1960  60 y.o.  male         CC/Interval History:  Maintains on sedation mec vent  No acute events overnight maintains on mechanical ventilation.  Was much calmer when his daughter was present allow for lightening of sedation.  We will try an SBT today when she is here to see if she can help calm him down to successfully extubate him.  I think ventilator would allow at present time and get him through his agitation.  Physical Exam:  /82   Pulse 96   Temp 99.4 °F (37.4 °C) (Oral)   Resp 20   Ht 185.4 cm (73\")   Wt 113 kg (249 lb 5.4 oz)   SpO2 99%   BMI 32.90 kg/m²   Body mass index is 32.9 kg/m².    Intake/Output Summary (Last 24 hours) at 3/12/2021 1222  Last data filed at 3/12/2021 0500  Gross per 24 hour   Intake 3049.35 ml   Output 2200 ml   Net 849.35 ml   Vent Settings          Resp Rate (Set): 18  Pressure Support (cm H2O): 8 cm H20  FiO2 (%): (!) 20 %  PEEP/CPAP (cm H2O): 5 cm H20    Minute Ventilation (L/min) (Obs): 16.3 L/min  Resp Rate (Observed) Vent: 19  I:E Ratio (Set): 1:0.32  I:E Ratio (Obs): 1:1.30    PIP Observed (cm H2O): 13 cm H2O  Plateau Pressure (cm H2O): 0 cm H2O  propofol  precedex  Prn fentanyl      General appearance: Nontoxic on mechanical ventilation sedated  Eyes: anicteric sclerae, moist conjunctivae;  HENT: Atraumatic; ET tube  Neck: Trachea midline  Lungs: Mechanical bilateral breath sounds coarse no wheeze, with normal respiratory effort and no intercostal retractions  CV: Distant no rub  Abdomen: Obese soft bowel sounds present  Extremities: No peripheral edema or extremity lymphadenopathy  Skin: Warm well perfused no diffuse rash  Psych/neuro moves extremities wont follow commands but appears agitated     Data Review:  Notable Labs:  Results from last 7 days   Lab Units 03/12/21  0813 03/11/21  0740 03/10/21  0427 03/09/21  0321 " 03/08/21  1001 03/08/21  0317 03/07/21  1105   WBC 10*3/mm3 10.31 11.32* 12.47* 16.21* 17.64* 13.38* 5.94   HEMOGLOBIN g/dL 11.1* 12.3* 11.7* 12.1* 12.5* 12.2* 11.6*   PLATELETS 10*3/mm3 167 156 199 220 212 222 190     Results from last 7 days   Lab Units 03/12/21  0813 03/11/21  0740 03/10/21  0427 03/09/21  0321 03/08/21  0317 03/07/21  1106 03/07/21  1045   SODIUM mmol/L 145 143 141 142 139 141  --    POTASSIUM mmol/L 3.7 3.8 3.8 4.0 4.4 4.0  --    CHLORIDE mmol/L 111* 109* 107 109* 109* 107  --    CO2 mmol/L 23.5 22.8 25.3 21.5* 15.9* 25.9  --    BUN mg/dL 25* 24* 18 17 16 12  --    CREATININE mg/dL 0.98 1.09 0.97 1.37* 1.40* 0.99 1.00   GLUCOSE mg/dL 146* 135* 164* 134* 143* 107*  --    CALCIUM mg/dL 8.5* 8.6 8.3* 8.1* 7.8* 8.2*  --    PHOSPHORUS mg/dL 3.6 3.4  --   --   --   --   --    Estimated Creatinine Clearance: 106.9 mL/min (by C-G formula based on SCr of 0.98 mg/dL).    Results from last 7 days   Lab Units 03/12/21  0813 03/11/21  0740 03/10/21  0427 03/09/21  0321 03/08/21  0317   AST (SGOT) U/L  --   --  94* 32 12   ALT (SGPT) U/L  --   --  22 8 7   PLATELETS 10*3/mm3 167 156 199 220 222       Results from last 7 days   Lab Units 03/12/21  1201 03/12/21  0418 03/08/21  1121 03/07/21  1620   PH, ARTERIAL pH units 7.525* 7.459* 7.399 7.311*   PCO2, ARTERIAL mm Hg 30.7* 34.1* 32.3* 46.5*   PO2 ART mm Hg 91.6 77.7* 67.9* 56.2*   HCO3 ART mmol/L 25.3 24.2 19.9* 23.4       Imaging:  Reviewed chest images personally from past 3 days    Ct chest - significant posterior opacities suggestive of asp pna given his history    Scheduled meds:    aspirin, 81 mg, Nasogastric, Daily  atorvastatin, 80 mg, Nasogastric, Nightly  chlorhexidine, 15 mL, Mouth/Throat, Q12H  hydrALAZINE, 100 mg, Nasogastric, Q8H  piperacillin-tazobactam, 3.375 g, Intravenous, Q8H  QUEtiapine, 100 mg, Nasogastric, Q12H  ticagrelor, 90 mg, Oral, Daily        ASSESSMENT  /  PLAN:  Aspiration pneumonia  Acute stroke likely secondary to left  carotid occlusion status post TPA and stenting  History of right MCA stroke previously  Aspiration pneumonia  Abnormal chest x-ray-question mediastinal mass or hilar mass send for CT chest  HTN  HLD  Seizure history  Smoker  H/o Polysubstance abuse  MARIO  Metabolic acidosis improved  Leukocytosis  [facility]     Zosyn cont asp pna  vent reviewed adjusted  Was much calmer when his daughter was present allow for lightening of sedation.  We will try an SBT today when she is here to see if she can help calm him down to successfully extubate him.  I think ventilator would allow at present time and get him through his agitation.  Wean down Seroquel if able to extubate today  Blood pressure controlled  Hydralazine 100 - 3 times daily  Updated daughter at bedside  Discussed with respiratory therapist  Total critical care time was 35minutes, excluding any separately billable procedure time.  Time did not overlap with any other provider.      Deandre Rausch MD  [facility]  03/12/21  8825

## 2021-03-12 NOTE — PROGRESS NOTES
[facility] NEUROSURGERY PROGRESS NOTE    PATIENT IDENTIFICATION:   Name:  Jose R Dalton      MRN:  [id number]     60 y.o.  male               CC: POD 5 left ICA thrombectomy, angioplasty, stent placement      Subjective     Interval History: Much more cooperative and alert.  Nursing weaning off propofol.  Remains on Precedex as well.  Denies headache    ROS:  No headache  Agitation    Objective     Vital signs in last 24 hours:  Temp:  [98.4 °F (36.9 °C)-100.4 °F (38 °C)] 99.4 °F (37.4 °C)  Heart Rate:  [] 96  Resp:  [20-37] 20  BP: (110-214)/() 152/82  FiO2 (%):  [20 %-97 %] 20 %      Intake/Output this shift:  No intake/output data recorded.      Intake/Output last 3 shifts:  I/O last 3 completed shifts:  In: 4348.4 [I.V.:2157.4; Other:350; NG/GT:1841]  Out: 3100 [Urine:3050; Stool:50]    LABS:  Results from last 7 days   Lab Units 03/12/21  0813 03/11/21  0740 03/10/21  0427   WBC 10*3/mm3 10.31 11.32* 12.47*   HEMOGLOBIN g/dL 11.1* 12.3* 11.7*   HEMATOCRIT % 34.3* 36.5* 36.4*   PLATELETS 10*3/mm3 167 156 199     Results from last 7 days   Lab Units 03/12/21  0813 03/11/21  0740 03/10/21  0427 03/09/21  0321 03/08/21  0317   SODIUM mmol/L 145 143 141 142 139   POTASSIUM mmol/L 3.7 3.8 3.8 4.0 4.4   CHLORIDE mmol/L 111* 109* 107 109* 109*   CO2 mmol/L 23.5 22.8 25.3 21.5* 15.9*   BUN mg/dL 25* 24* 18 17 16   CREATININE mg/dL 0.98 1.09 0.97 1.37* 1.40*   CALCIUM mg/dL 8.5* 8.6 8.3* 8.1* 7.8*   BILIRUBIN mg/dL  --   --  0.5 0.5 0.3   ALK PHOS U/L  --   --  46 46 46   ALT (SGPT) U/L  --   --  22 8 7   AST (SGOT) U/L  --   --  94* 32 12   GLUCOSE mg/dL 146* 135* 164* 134* 143*      P2Y12 3/11= 63    IMAGING STUDIES:  No new imaging    I personally viewed and interpreted the patient's chart.      Meds reviewed/changed: Yes    Current Facility-Administered Medications:   •  aspirin chewable tablet 81 mg, 81 mg, Nasogastric, Daily, Allie Montes, APRN, 81 mg at 03/12/21 0910  •  atorvastatin (LIPITOR)  tablet 80 mg, 80 mg, Nasogastric, Nightly, Nick Washington MD, 80 mg at 03/11/21 2019  •  chlorhexidine (PERIDEX) 0.12 % solution 15 mL, 15 mL, Mouth/Throat, Q12H, Deandre Rausch MD, 15 mL at 03/12/21 0900  •  enalaprilat (VASOTEC) injection 0.625 mg, 0.625 mg, Intravenous, Q6H PRN, Nick Washington MD, 0.625 mg at 03/09/21 2159  •  hydrALAZINE (APRESOLINE) injection 20 mg, 20 mg, Intravenous, Q4H PRN, Ever Longoria MD, 20 mg at 03/12/21 1139  •  hydrALAZINE (APRESOLINE) tablet 100 mg, 100 mg, Nasogastric, Q8H, Deandre Rausch MD, 100 mg at 03/12/21 0559  •  labetalol (NORMODYNE,TRANDATE) injection 20 mg, 20 mg, Intravenous, Q10 Min PRN, Nick Washington MD, 20 mg at 03/09/21 0238  •  metoprolol tartrate (LOPRESSOR) injection 5 mg, 5 mg, Intravenous, Q6H PRN, Ai Spangler MD, 5 mg at 03/11/21 1711  •  niCARdipine (CARDENE) 50 mg in sodium chloride 0.9 % 250 mL IVPB, 5-15 mg/hr, Intravenous, Titrated, Deandre Rausch MD, Stopped at 03/09/21 1514  •  ondansetron (ZOFRAN) injection 4 mg, 4 mg, Intravenous, Q6H PRN, Ai Spangler MD, 4 mg at 03/10/21 0512  •  piperacillin-tazobactam (ZOSYN) 3.375 g in iso-osmotic dextrose 50 ml (premix), 3.375 g, Intravenous, Q8H, Nick Washington MD, 3.375 g at 03/12/21 0559  •  QUEtiapine (SEROquel) tablet 50 mg, 50 mg, Nasogastric, Q12H, Deandre Rausch MD  •  sodium chloride 0.9 % flush 10 mL, 10 mL, Intravenous, PRN, Nick Washington MD  •  ticagrelor (BRILINTA) tablet 90 mg, 90 mg, Oral, Daily, Allie Montes APRN, 90 mg at 03/12/21 0910      Physical Exam:    General:   Intubated and sedated.  However today, he does open eyes with verbal stimuli.  He follows commands readily.  Assisting vent.  HEENT:    Orally intubated.  Nasogastric feeding tube in place  CN III IV VI: Pupils 3 mm brisk.  CN VII: No obvious facial asymmetry, but equipment limits exam    Motor: Moves all extremities to command.  Generalized weakness but  "antigravity   Station and Gait: Unable to assess due to intubation and sedation  Coordination: Too weak for finger-nose testing  Extremities:   Wearing SCD.      Assessment/Plan     ASSESSMENT:      Cerebrovascular accident (CVA) (CMS/[facility])    Patient improving neurologically.  Awake and following commands today.  Nods and shakes head to questions.  Nurse reports weaning off propofol and hopefully planning to extubate later today when daughter is present as she seems to help keep him relaxed.  Generally weak but no focal deficits.  P2Y12 therapeutic at 63 with Brilinta 90 mg daily dosing.  We will continue this in conjunction with 81 mg aspirin.  Nothing further to offer from neurosurgical standpoint this time.  We will sign off but remain available.  We will arrange outpatient follow-up.    PLAN:   Neurosurgery will sign off and arrange outpatient follow-up. Will discuss timing with Dr. Washington  Brilinta 90 mg daily.  Continue aspirin 81 mg.   stroke management work-up per neurology.  Wean sedation as tolerated.    Hopefully extubate soon.    I discussed the patient's findings and my recommendations with patient, nursing staff and Dr. Washington      LOS: 5 days       Allie Montes, APRN  3/12/2021  12:51 EST    \"Dictated utilizing Dragon dictation\".      "

## 2021-03-13 LAB
ALBUMIN SERPL-MCNC: 3.5 G/DL (ref 3.5–5.2)
ANION GAP SERPL CALCULATED.3IONS-SCNC: 13.9 MMOL/L (ref 5–15)
BUN SERPL-MCNC: 29 MG/DL (ref 6–20)
BUN/CREAT SERPL: 25.7 (ref 7–25)
CALCIUM SPEC-SCNC: 8.8 MG/DL (ref 8.6–10.5)
CHLORIDE SERPL-SCNC: 111 MMOL/L (ref 98–107)
CO2 SERPL-SCNC: 21.1 MMOL/L (ref 22–29)
CREAT SERPL-MCNC: 1.13 MG/DL (ref 0.76–1.27)
GFR SERPL CREATININE-BSD FRML MDRD: 81 ML/MIN/1.73
GLUCOSE SERPL-MCNC: 160 MG/DL (ref 65–99)
PHOSPHATE SERPL-MCNC: 3.1 MG/DL (ref 2.5–4.5)
POTASSIUM SERPL-SCNC: 3.9 MMOL/L (ref 3.5–5.2)
SODIUM SERPL-SCNC: 146 MMOL/L (ref 136–145)

## 2021-03-13 PROCEDURE — 94799 UNLISTED PULMONARY SVC/PX: CPT

## 2021-03-13 PROCEDURE — 92610 EVALUATE SWALLOWING FUNCTION: CPT

## 2021-03-13 PROCEDURE — 25010000002 HYDRALAZINE PER 20 MG: Performed by: PSYCHIATRY & NEUROLOGY

## 2021-03-13 PROCEDURE — 97530 THERAPEUTIC ACTIVITIES: CPT

## 2021-03-13 PROCEDURE — 25010000002 PIPERACILLIN SOD-TAZOBACTAM PER 1 G: Performed by: NEUROLOGICAL SURGERY

## 2021-03-13 PROCEDURE — 97162 PT EVAL MOD COMPLEX 30 MIN: CPT

## 2021-03-13 PROCEDURE — 25010000002 DEXAMETHASONE PER 1 MG: Performed by: INTERNAL MEDICINE

## 2021-03-13 PROCEDURE — 80069 RENAL FUNCTION PANEL: CPT | Performed by: INTERNAL MEDICINE

## 2021-03-13 PROCEDURE — 99232 SBSQ HOSP IP/OBS MODERATE 35: CPT | Performed by: PSYCHIATRY & NEUROLOGY

## 2021-03-13 RX ORDER — ENALAPRILAT 2.5 MG/2ML
1.25 INJECTION INTRAVENOUS EVERY 6 HOURS
Status: DISCONTINUED | OUTPATIENT
Start: 2021-03-13 | End: 2021-03-14

## 2021-03-13 RX ORDER — ENALAPRILAT 2.5 MG/2ML
1.25 INJECTION INTRAVENOUS EVERY 6 HOURS PRN
Status: DISCONTINUED | OUTPATIENT
Start: 2021-03-13 | End: 2021-03-14

## 2021-03-13 RX ORDER — CLONIDINE HYDROCHLORIDE 0.1 MG/1
0.2 TABLET ORAL EVERY 8 HOURS SCHEDULED
Status: DISCONTINUED | OUTPATIENT
Start: 2021-03-13 | End: 2021-03-14

## 2021-03-13 RX ORDER — ALBUTEROL SULFATE 2.5 MG/3ML
2.5 SOLUTION RESPIRATORY (INHALATION) EVERY 4 HOURS PRN
Status: DISCONTINUED | OUTPATIENT
Start: 2021-03-13 | End: 2021-03-19 | Stop reason: HOSPADM

## 2021-03-13 RX ADMIN — QUETIAPINE FUMARATE 50 MG: 50 TABLET, FILM COATED ORAL at 20:39

## 2021-03-13 RX ADMIN — SODIUM CHLORIDE 10 MG/HR: 9 INJECTION, SOLUTION INTRAVENOUS at 09:59

## 2021-03-13 RX ADMIN — QUETIAPINE FUMARATE 50 MG: 50 TABLET, FILM COATED ORAL at 09:10

## 2021-03-13 RX ADMIN — CHLORHEXIDINE GLUCONATE 15 ML: 1.2 RINSE ORAL at 20:40

## 2021-03-13 RX ADMIN — ENALAPRILAT 1.25 MG: 2.5 INJECTION INTRAVENOUS at 13:22

## 2021-03-13 RX ADMIN — SODIUM CHLORIDE 15 MG/HR: 9 INJECTION, SOLUTION INTRAVENOUS at 01:58

## 2021-03-13 RX ADMIN — LABETALOL HYDROCHLORIDE 20 MG: 5 INJECTION, SOLUTION INTRAVENOUS at 23:08

## 2021-03-13 RX ADMIN — TAZOBACTAM SODIUM AND PIPERACILLIN SODIUM 3.38 G: 375; 3 INJECTION, SOLUTION INTRAVENOUS at 06:16

## 2021-03-13 RX ADMIN — HYDRALAZINE HYDROCHLORIDE 20 MG: 20 INJECTION, SOLUTION INTRAMUSCULAR; INTRAVENOUS at 12:34

## 2021-03-13 RX ADMIN — ALBUTEROL SULFATE 2.5 MG: 2.5 SOLUTION RESPIRATORY (INHALATION) at 19:47

## 2021-03-13 RX ADMIN — DEXMEDETOMIDINE HYDROCHLORIDE 0.7 MCG/KG/HR: 100 INJECTION, SOLUTION, CONCENTRATE INTRAVENOUS at 05:31

## 2021-03-13 RX ADMIN — ALBUTEROL SULFATE 2.5 MG: 2.5 SOLUTION RESPIRATORY (INHALATION) at 13:18

## 2021-03-13 RX ADMIN — ATORVASTATIN CALCIUM 80 MG: 80 TABLET, FILM COATED ORAL at 20:40

## 2021-03-13 RX ADMIN — CHLORHEXIDINE GLUCONATE 15 ML: 1.2 RINSE ORAL at 09:30

## 2021-03-13 RX ADMIN — ENALAPRILAT 1.25 MG: 1.25 INJECTION INTRAVENOUS at 16:10

## 2021-03-13 RX ADMIN — ISODIUM CHLORIDE 3 ML: 0.03 SOLUTION RESPIRATORY (INHALATION) at 19:47

## 2021-03-13 RX ADMIN — DEXAMETHASONE SODIUM PHOSPHATE 4 MG: 4 INJECTION, SOLUTION INTRAMUSCULAR; INTRAVENOUS at 06:16

## 2021-03-13 RX ADMIN — Medication 15 MG/HR: at 20:44

## 2021-03-13 RX ADMIN — ISODIUM CHLORIDE 3 ML: 0.03 SOLUTION RESPIRATORY (INHALATION) at 08:09

## 2021-03-13 RX ADMIN — Medication 15 MG/HR: at 11:19

## 2021-03-13 RX ADMIN — SODIUM CHLORIDE 15 MG/HR: 9 INJECTION, SOLUTION INTRAVENOUS at 05:31

## 2021-03-13 RX ADMIN — LABETALOL HYDROCHLORIDE 20 MG: 5 INJECTION, SOLUTION INTRAVENOUS at 18:44

## 2021-03-13 RX ADMIN — ASPIRIN 81 MG: 81 TABLET, CHEWABLE ORAL at 09:10

## 2021-03-13 RX ADMIN — LABETALOL HYDROCHLORIDE 20 MG: 5 INJECTION, SOLUTION INTRAVENOUS at 01:10

## 2021-03-13 RX ADMIN — HYDRALAZINE HYDROCHLORIDE 20 MG: 20 INJECTION, SOLUTION INTRAMUSCULAR; INTRAVENOUS at 17:53

## 2021-03-13 RX ADMIN — CLONIDINE HYDROCHLORIDE 0.2 MG: 0.1 TABLET ORAL at 13:26

## 2021-03-13 RX ADMIN — HYDRALAZINE HYDROCHLORIDE 100 MG: 50 TABLET, FILM COATED ORAL at 13:32

## 2021-03-13 RX ADMIN — ALBUTEROL SULFATE 2.5 MG: 2.5 SOLUTION RESPIRATORY (INHALATION) at 08:09

## 2021-03-13 RX ADMIN — METOPROLOL TARTRATE 5 MG: 1 INJECTION, SOLUTION INTRAVENOUS at 14:06

## 2021-03-13 RX ADMIN — ISODIUM CHLORIDE 3 ML: 0.03 SOLUTION RESPIRATORY (INHALATION) at 13:19

## 2021-03-13 RX ADMIN — HYDRALAZINE HYDROCHLORIDE 100 MG: 50 TABLET, FILM COATED ORAL at 21:39

## 2021-03-13 RX ADMIN — ENALAPRILAT 1.25 MG: 1.25 INJECTION INTRAVENOUS at 11:22

## 2021-03-13 RX ADMIN — DEXMEDETOMIDINE HYDROCHLORIDE 0.9 MCG/KG/HR: 100 INJECTION, SOLUTION, CONCENTRATE INTRAVENOUS at 01:16

## 2021-03-13 RX ADMIN — ENALAPRILAT 1.25 MG: 1.25 INJECTION INTRAVENOUS at 22:21

## 2021-03-13 RX ADMIN — CLONIDINE HYDROCHLORIDE 0.2 MG: 0.1 TABLET ORAL at 21:06

## 2021-03-13 RX ADMIN — SODIUM CHLORIDE, PRESERVATIVE FREE 10 ML: 5 INJECTION INTRAVENOUS at 09:10

## 2021-03-13 RX ADMIN — TICAGRELOR 90 MG: 90 TABLET ORAL at 09:10

## 2021-03-13 RX ADMIN — Medication 15 MG/HR: at 01:16

## 2021-03-13 NOTE — PLAN OF CARE
Problem: Adult Inpatient Plan of Care  Goal: Plan of Care Review  Outcome: Ongoing, Progressing  Flowsheets (Taken 3/13/2021 0907)  Plan of Care Reviewed With: (RN made aware.)   patient   other (see comments)  Outcome Summary: B   Goal Outcome Evaluation:  Plan of Care Reviewed With: patient, other (see comments) (RN made aware.)     Outcome Summary: Bedside swallow evaluation completed. Pt exhibited coughing and audible swallow with thin liquids.. Audible swallow with nectar liquids.  Pt appeared to have difficulty breathing as the test progressed.  Pt initially did not have difficulty with honey thick liquids. He exhibited throat clearing and audible swallow with puree and coughing with a HTL wash. SLP recommends pt remain NPO. He may have medication crushed in applesauce / pudding.with a HTL wash. Treatment feeds of puree with HTL's . Pt must double swallow after each bite/drink. SLP recommends VFSS,

## 2021-03-13 NOTE — PROGRESS NOTES
"  PROGRESS NOTE  Patient Name: Jose R Dalton  Age/Sex: 60 y.o. male  : 1960  MRN: [id number]    Date of Admission: 3/7/2021  Date of Encounter Visit: 21   LOS: 6 days   Patient Care Team:  Provider, No Known as PCP - General  Provider, No Known as PCP - Family Medicine    Chief Complaint: Confusion and agitation, hypertension, aspiration pneumonia, right MCA stroke by history, seizure history, L MCA stroke secondary to LICA occlusion status post TPA with evolving sizable scattered L MCA territory infarct on top of a large old R MCA stroke    Hospital course: Patient is doing better, off the ventilator, on room air this morning, passed his swallow evaluation is allowed to have meds with applesauce.  Still on the Precedex drip, still on the Cardene and Cardizem drip.  Afebrile.  He is very pleasant awake and responsive but still confused.  Denies any chest pain or shortness of breath.      REVIEW OF SYSTEMS:   CONSTITUTIONAL: no fever or chills  CARDIOVASCULAR: No chest pain, chest pressure or chest discomfort. No palpitations or edema.   RESPIRATORY: No shortness of breath, cough or sputum.   GASTROINTESTINAL: No anorexia, nausea, vomiting or diarrhea. No abdominal pain or blood.   HEMATOLOGIC: No bleeding or bruising.     Ventilator/Non-Invasive Ventilation Settings (From admission, onward) Comment     Start     Ordered                  Vital Signs  Temp:  [98.5 °F (36.9 °C)-99.4 °F (37.4 °C)] 98.7 °F (37.1 °C)  Heart Rate:  [] 84  Resp:  [15-22] 16  BP: (118-211)/(47-96) 141/65  FiO2 (%):  [20 %] 20 %  SpO2:  [90 %-99 %] 93 %  on  Flow (L/min):  [2] 2 Device (Oxygen Therapy): room air    Intake/Output Summary (Last 24 hours) at 3/13/2021 0900  Last data filed at 3/13/2021 0542  Gross per 24 hour   Intake 1038 ml   Output --   Net 1038 ml     Flowsheet Rows      First Filed Value   Admission Height  185.4 cm (73\") Documented at 2021 1113   Admission Weight  127 kg (280 lb 3.2 oz) Documented " at 03/07/2021 1111        Body mass index is 32.9 kg/m².      03/08/21  0823 03/09/21  0500 03/10/21  0405   Weight: 117 kg (257 lb) 113 kg (249 lb 5.4 oz) 113 kg (249 lb 5.4 oz)       Physical Exam:  GEN:  No acute distress, awake, pleasantly confused, cooperative, well developed   EYES:   Sclerae clear. No icterus. PERRL. Normal EOM  ENT:   External ears/nose normal, no oral lesions, no thrush, mucous membranes moist  NECK:  Supple, midline trachea, no JVD  LUNGS: Normal chest on inspection, CTAB, no wheezes. No rhonchi. No crackles. Respirations regular, even and unlabored.   CV:  Regular rhythm and rate. Normal S1/S2. No murmurs, gallops, or rubs noted.  ABD:  Soft, nontender and nondistended. Normal bowel sounds. No guarding  EXT:  Moves all extremities well. No cyanosis. No redness. No edema.   Skin: Dry, intact, no bleeding    Results Review:    Results From Last 14 Days   Lab Units 03/12/21  0813 03/08/21  0317   TRIGLYCERIDES mg/dL 117 143     Results from last 7 days   Lab Units 03/12/21  0813 03/11/21  0740 03/10/21  0427 03/09/21  0321 03/08/21  0317 03/07/21  1106 03/07/21  1045   SODIUM mmol/L 145 143 141 142 139 141  --    POTASSIUM mmol/L 3.7 3.8 3.8 4.0 4.4 4.0  --    CHLORIDE mmol/L 111* 109* 107 109* 109* 107  --    CO2 mmol/L 23.5 22.8 25.3 21.5* 15.9* 25.9  --    BUN mg/dL 25* 24* 18 17 16 12  --    CREATININE mg/dL 0.98 1.09 0.97 1.37* 1.40* 0.99 1.00   CALCIUM mg/dL 8.5* 8.6 8.3* 8.1* 7.8* 8.2*  --    AST (SGOT) U/L  --   --  94* 32 12 12  --    ALT (SGPT) U/L  --   --  22 8 7 9  --    ANION GAP mmol/L 10.5 11.2 8.7 11.5 14.1 8.1  --    ALBUMIN g/dL 3.30* 3.30* 3.40* 3.60 3.60 3.80  --      Results from last 7 days   Lab Units 03/07/21  1106   TROPONIN T ng/mL <0.010             Results from last 7 days   Lab Units 03/12/21  0813 03/11/21  0740 03/10/21  0427 03/09/21  0321 03/08/21  1001 03/08/21  0317 03/07/21  1105   WBC 10*3/mm3 10.31 11.32* 12.47* 16.21* 17.64* 13.38* 5.94   HEMOGLOBIN  g/dL 11.1* 12.3* 11.7* 12.1* 12.5* 12.2* 11.6*   HEMATOCRIT % 34.3* 36.5* 36.4* 37.0* 37.9 37.8 35.3*   PLATELETS 10*3/mm3 167 156 199 220 212 222 190   MCV fL 79.6 79.3 79.8 80.8 80.6 80.8 80.6   NEUTROPHIL % % 75.9 73.8  --   --  89.7*  --  53.6   LYMPHOCYTE % % 9.9* 13.3*  --   --  5.4*  --  32.2   MONOCYTES % % 10.0 9.0  --   --  4.3*  --  11.4   EOSINOPHIL % % 3.3 3.1  --   --  0.0*  --  2.0   BASOPHIL % % 0.3 0.4  --   --  0.1  --  0.5   IMM GRAN % % 0.6* 0.4  --   --  0.5  --  0.3     Results from last 7 days   Lab Units 03/08/21  1001 03/07/21  1105   INR  1.35* 1.03   APTT seconds 33.6 33.7         Results from last 7 days   Lab Units 03/12/21  0813 03/08/21  0317   CHOLESTEROL mg/dL  --  227*   TRIGLYCERIDES mg/dL 117 143   HDL CHOL mg/dL  --  54     Results from last 7 days   Lab Units 03/12/21  1201 03/12/21  0418 03/08/21  1121 03/07/21  1620   PH, ARTERIAL pH units 7.525* 7.459* 7.399 7.311*   PCO2, ARTERIAL mm Hg 30.7* 34.1* 32.3* 46.5*   PO2 ART mm Hg 91.6 77.7* 67.9* 56.2*   HCO3 ART mmol/L 25.3 24.2 19.9* 23.4     Results from last 7 days   Lab Units 03/08/21  0317   HEMOGLOBIN A1C % 5.90*     Glucose   Date/Time Value Ref Range Status   03/12/2021 1655 111 70 - 130 mg/dL Final   03/12/2021 1111 134 (H) 70 - 130 mg/dL Final   03/12/2021 0557 149 (H) 70 - 130 mg/dL Final   03/11/2021 2350 128 70 - 130 mg/dL Final   03/11/2021 1747 109 70 - 130 mg/dL Final   03/11/2021 1103 129 70 - 130 mg/dL Final   03/11/2021 0637 131 (H) 70 - 130 mg/dL Final         Results from last 7 days   Lab Units 03/07/21  2142   BLOODCX  No growth at 5 days  No growth at 5 days         Results from last 7 days   Lab Units 03/07/21  1051   COVID19  Not Detected               Imaging:   Imaging Results (All)        I reviewed the patient's new clinical results.  I personally viewed and interpreted the patient's imaging results: No obvious infiltrate or focal process        Medication Review:   aspirin, 81 mg, Nasogastric,  Daily  atorvastatin, 80 mg, Nasogastric, Nightly  chlorhexidine, 15 mL, Mouth/Throat, Q12H  hydrALAZINE, 100 mg, Nasogastric, Q8H  piperacillin-tazobactam, 3.375 g, Intravenous, Q8H  QUEtiapine, 50 mg, Nasogastric, Q12H  sodium chloride, 3 mL, Nebulization, TID - RT  ticagrelor, 90 mg, Oral, Daily        dexmedetomidine, 0.2-1.5 mcg/kg/hr, Last Rate: 0.4 mcg/kg/hr (03/13/21 0700)  dilTIAZem, 5-15 mg/hr, Last Rate: 15 mg/hr (03/13/21 0704)  niCARdipine, 5-15 mg/hr, Last Rate: 15 mg/hr (03/13/21 0531)        ASSESSMENT:   1. Aspiration pneumonia  2. Acute stroke likely secondary to left carotid occlusion status post TPA and stenting  3. History of right MCA stroke previously  4. Abnormal chest x-ray-question mediastinal mass or hilar mass send for CT chest  5. HTN  6. HLD  7. Seizure history  8. Smoker  9. H/o Polysubstance abuse  10. MARIO  11. Metabolic acidosis improved  12. Leukocytosis  13. Routine mgmt of [facility] ventilation     PLAN:    White count is down to normal, patient is currently off antibiotics, chest x-ray is free of infiltrate.  Patient is on  room air.  Kidney function is normal and cultures are negative to date.  Blood pressure is on the higher end, patient will be getting his oral medication and we will try to put the patient on clonidine and see if we can wean and discontinue the Precedex.  That should help with the blood pressure control as well.  Consider further adjustment on his oral antihypertensive medication if the patient is still dependent on the drip and we will add as needed IV BP medication to help with hypertensive control and get him off the drips.  Patient is cleared to transfer out of the intensive care unit if okay with the stroke team once he is off the Precedex on the other IV drips.    Discussed with patient and the nursing staff at the bedside    Disposition:     Safia Quiroz MD  03/13/21  09:00 EST         Dictated utilizing Dragon dictation

## 2021-03-13 NOTE — PROGRESS NOTES
Neurology Note    Patient:  Jose R Dalton    YOB: 1960    REFERRING PHYSICIAN:  Deandre Rausch MD    CHIEF COMPLAINT:    stroke    HISTORY OF PRESENT ILLNESS:   The patient is much improved, extubated, sedation weaned, has been cleared for po diet, talking and following commands.    Past Medical History:  Past Medical History:   Diagnosis Date   • Elevated cholesterol    • Hypertension    • Obesity    • Seizure (CMS/HCC)    • Seizures (CMS/HCC)    • Stroke (CMS/HCC)        Past Surgical History:  Past Surgical History:   Procedure Laterality Date   • CAROTID ENDARTERECTOMY Right    • CAROTID ENDARTERECTOMY Right    • EMBOLECTOMY N/A 3/7/2021    Procedure: MECHANICAL EMBOLECTOMY, WITH LEFT CAROTID ARTERY ANGIOPLASTY AND STENT PLACEMENT;  Surgeon: Nick Wahsington MD;  Location: [facility] 18/19;  Service: Neurosurgery;  Laterality: N/A;       Social History:   Social History     Socioeconomic History   • Marital status: Single     Spouse name: Not on file   • Number of children: Not on file   • Years of education: Not on file   • Highest education level: Not on file   Tobacco Use   • Smoking status: Current Every Day Smoker     Packs/day: 0.50   • Smokeless tobacco: Never Used   Substance and Sexual Activity   • Alcohol use: No   • Drug use: Yes     Types: Cocaine(coke), Marijuana     Comment: positive drug screen in 2016, current possession of marijuana 3/7/21   • Sexual activity: Defer        Family History:   History reviewed. No pertinent family history.    Medications Prior to Admission:    Prior to Admission medications    Medication Sig Start Date End Date Taking? Authorizing Provider   aspirin 325 MG tablet Take 325 mg by mouth daily.    Provider, MD David   hydrOXYzine (ATARAX) 25 MG tablet Take 1 tablet by mouth every 6 (six) hours as needed for itching. 7/29/16   Fly Appiah MD   MethylPREDNISolone (MEDROL, WAYNE,) 4 MG tablet Take as directed on package  instructions. 7/29/16   Fly Appiah MD       Allergies:  Patient has no known allergies.      Review of system  Review of Systems   Unable to perform ROS: Mental status change       Vitals:    03/13/21 1115   BP: 157/61   Pulse: 90   Resp:    Temp: 98.3 °F (36.8 °C)   SpO2: 95%       Physical exam  Physical Exam  Cardiovascular:      Rate and Rhythm: Normal rate and regular rhythm.   Pulmonary:      Effort: Pulmonary effort is normal.   Neurological:      Mental Status: He is alert.      Comments: Oriented to name and hospital, distracted by bball on TV, speech fairly clear, VFF, no facial droop, moving limbs well.           Lab Results   Component Value Date    WBC 10.31 03/12/2021    HGB 11.1 (L) 03/12/2021    HCT 34.3 (L) 03/12/2021    MCV 79.6 03/12/2021     03/12/2021     Lab Results   Component Value Date    GLUCOSE 160 (H) 03/13/2021    BUN 29 (H) 03/13/2021    CREATININE 1.13 03/13/2021    EGFRIFAFRI 81 03/13/2021    BCR 25.7 (H) 03/13/2021    CO2 21.1 (L) 03/13/2021    CALCIUM 8.8 03/13/2021    ALBUMIN 3.50 03/13/2021    AST 94 (H) 03/10/2021    ALT 22 03/10/2021     P2Y12 Platelet Inhibition   194 - 418 PRU 63Low               During this visit the following were done:  Labs Reviewed [x]    Labs Ordered []    Radiology Reports Reviewed []    Radiology Ordered []    EKG, echo, and/or stress test reviewed []    EEG results reviewed  []    EEG reviewed and interpreted per myself   []    Discussed case with neurointerventionalist or neuroradiologist []    Referring Provider Records Reviewed []    ER Records Reviewed []    Hospital Records Reviewed []    History Obtained From Family []    Radiological images view and Interpreted per myself []    Case Discussed with referring provider []     Decision to obtain and request outside records  []        Assessment and Plan     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  MRI with evolving sizable scattered LMCA territory infarcts, large old RMCA  stroke. Much improved today.   - Neurologically stable for telemetry.   - Continue Seroquel 50 mg bid for agitation.   - Continue DAPT with aspirin and Brilinta.   - high dose statin.   - SBP<140.   - ST, PT, OT.    Call for questions, will see prn. Thanks.      Electronically signed by Tony Fraser MD on 3/13/2021 at 12:12 EST

## 2021-03-13 NOTE — PLAN OF CARE
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 Pt prior right MCA CVA in the past. Pt also with metabolic acidosis, leukocytosis, aspiration PNA.  and is off the vent one day. Pt is alert and cooperative. He appears to understand that he has had a second stroke. Pt difficult to keep on task at times. Strength eval difficult due to attention span and inability to stay on task. Pt requires asst of 2 for all mobility. He was able to amb approx 12' with asst of 2 plus RN with IV pole. Narrow uang, flexed posture, hyper ext of left knee noted but very impulsive and irregular movements, maxs cues to move feet the last several feet. Needs to be followed with chair. Pt has not been oob for almost a week. PLOD is indep all aspects as he reports no residual fromlast CVA. He lives alone. Is engaged and has 13 steps to enter apt. Pt is at marked risk for falls at this time and will benefit from cont skilled PT for progression toward least asst prior to transfer to snf or acute rehab at d/c.

## 2021-03-13 NOTE — THERAPY EVALUATION
Patient Name: Jose R Dalton  : 1960    MRN: [id number]                              Today's Date: 3/13/2021       Admit Date: 3/7/2021    Visit Dx:     ICD-10-CM ICD-9-CM   1. Cerebrovascular accident (CVA), unspecified mechanism (CMS/HCC)  I63.9 434.91     Patient Active Problem List   Diagnosis   • Cerebrovascular accident (CVA) (CMS/HCC)     Past Medical History:   Diagnosis Date   • Elevated cholesterol    • Hypertension    • Obesity    • Seizure (CMS/HCC)    • Seizures (CMS/HCC)    • Stroke (CMS/HCC)      Past Surgical History:   Procedure Laterality Date   • CAROTID ENDARTERECTOMY Right    • CAROTID ENDARTERECTOMY Right    • EMBOLECTOMY N/A 3/7/2021    Procedure: MECHANICAL EMBOLECTOMY, WITH LEFT CAROTID ARTERY ANGIOPLASTY AND STENT PLACEMENT;  Surgeon: Nick Washington MD;  Location: [facility] ;  Service: Neurosurgery;  Laterality: N/A;     General Information     Row Name 21 1053          Physical Therapy Time and Intention    Document Type  evaluation  -SV     Mode of Treatment  individual therapy;physical therapy  -SV     Row Name 21 1053          General Information    Patient Profile Reviewed  yes  -SV     Prior Level of Function  independent:  -SV     Existing Precautions/Restrictions  fall  -SV     Barriers to Rehab  medically complex;cognitive status  -SV     Row Name 21 1053          Living Environment    Lives With  alone  -SV     Row Name 21 1053          Home Main Entrance    Number of Stairs, Main Entrance  -- 13  -SV     Row Name 21 1053          Stairs Within Home, Primary    Stairs, Within Home, Primary  indicates upstairs apt with 13 steps to get there  -SV     Row Name 21 1053          Cognition    Orientation Status (Cognition)  oriented to;person;place;situation  -SV     Row Name 21 1053          Safety Issues, Functional Mobility    Safety Issues Affecting Function (Mobility)  ability to follow commands;at risk behavior  observed;awareness of need for assistance;impulsivity;judgment  -SV     Impairments Affecting Function (Mobility)  balance;cognition;endurance/activity tolerance;shortness of breath;strength  -SV     Cognitive Impairments, Mobility Safety/Performance  attention;awareness, need for assistance;impulsivity;insight into deficits/self-awareness;problem-solving/reasoning  -SV       User Key  (r) = Recorded By, (t) = Taken By, (c) = Cosigned By    Initials Name Provider Type    SV Krystina Beltrán, PT Physical Therapist        Mobility     Row Name 03/13/21 1624          Bed Mobility    Bed Mobility  supine-sit;sit-supine  -SV     Supine-Sit [place] (Bed Mobility)  moderate assist (50% patient effort)  -SV     Sit-Supine [place] (Bed Mobility)  moderate assist (50% patient effort);maximum assist (25% patient effort);2 person assist  -SV     Assistive Device (Bed Mobility)  bed rails;draw sheet  -SV     Row Name 03/13/21 1624          Bed-Chair Transfer    Assistive Device (Bed-Chair Transfers)  walker, front-wheeled  -SV     Row Name 03/13/21 1624          Sit-Stand Transfer    Sit-Stand [place] (Transfers)  minimum assist (75% patient effort);2 person assist;verbal cues;nonverbal cues (demo/gesture)  -     Assistive Device (Sit-Stand Transfers)  walker, front-wheeled  -SV     Row Name 03/13/21 1624          Gait/Stairs (Locomotion)    [place] Level (Gait)  minimum assist (75% patient effort);2 person assist;1 person to manage equipment  -     Assistive Device (Gait)  walker, front-wheeled  -SV     Distance in Feet (Gait)  12' narrow aung, LLE hyper ext, markedly unsteadyflexed posture, shuffle steps, needed cues to advance feet  -SV       User Key  (r) = Recorded By, (t) = Taken By, (c) = Cosigned By    Initials Name Provider Type     Krystina Beltrán, PT Physical Therapist        Obj/Interventions     Row Name 03/13/21 1624          Range of Motion Comprehensive    Comment, General Range of  Motion  prom appears wfl,  pt follows most commands for rom but not consistent: able to slr, elevate BUE,  soo, flex/ext elbows, PF/DF all wfl  -SV     Row Name 03/13/21 1626          Strength Comprehensive (MMT)    Comment, General Manual Muscle Testing (MMT) Assessment  gross soo strength appears 3 or 3-/5 NT with MMT today  -SV     Row Name 03/13/21 1626          Balance    Comment, Balance  sit bal min then cga statically, static standing bal cga x2 wiht rwx  -SV     Row Name 03/13/21 1626          Sensory Assessment (Somatosensory)    Sensory Assessment (Somatosensory)  not tested  -SV       User Key  (r) = Recorded By, (t) = Taken By, (c) = Cosigned By    Initials Name Provider Type    Krystina Garzon, PT Physical Therapist        Goals/Plan     Row Name 03/13/21 1629          Bed Mobility Goal 1 (PT)    Activity/Assistive Device (Bed Mobility Goal 1, PT)  sit to supine/supine to sit  -SV     Rock Level/Cues Needed (Bed Mobility Goal 1, PT)  contact guard assist  -SV     Time Frame (Bed Mobility Goal 1, PT)  10 days  -SV     Row Name 03/13/21 1629          Transfer Goal 1 (PT)    Activity/Assistive Device (Transfer Goal 1, PT)  sit-to-stand/stand-to-sit  -SV     Rock Level/Cues Needed (Transfer Goal 1, PT)  contact guard assist  -SV     Time Frame (Transfer Goal 1, PT)  10 days  -SV     Strategies/Barriers (Transfers Goal 1, PT)  least AD  -SV     Row Name 03/13/21 1629          Gait Training Goal 1 (PT)    Activity/Assistive Device (Gait Training Goal 1, PT)  gait (walking locomotion)  -SV     Rock Level (Gait Training Goal 1, PT)  contact guard assist  -SV     Distance (Gait Training Goal 1, PT)  200' least AD  -SV     Time Frame (Gait Training Goal 1, PT)  10 days  -SV       User Key  (r) = Recorded By, (t) = Taken By, (c) = Cosigned By    Initials Name Provider Type    Krystina Garzon, PT Physical Therapist        Clinical Impression     Row Name 03/13/21 1624           Pain    Additional Documentation  Pain Scale: Numbers Pre/Post-Treatment (Group)  -SV     Row Name 03/13/21 1628          Pain Scale: Numbers Pre/Post-Treatment    Pretreatment Pain Rating  0/10 - no pain  -SV     Posttreatment Pain Rating  0/10 - no pain  -SV     Row Name 03/13/21 1628          Plan of Care Review    Plan of Care Reviewed With  patient  -SV     Row Name 03/13/21 1628          Therapy Assessment/Plan (PT)    Patient/Family Therapy Goals Statement (PT)  indep amb  -SV     Rehab Potential (PT)  good, to achieve stated therapy goals  -SV     Criteria for Skilled Interventions Met (PT)  yes  -SV     Predicted Duration of Therapy Intervention (PT)  6 weeks or greater  -SV     Row Name 03/13/21 1628          Vital Signs    O2 Delivery Pre Treatment  room air  -SV     O2 Delivery Intra Treatment  room air  -SV     O2 Delivery Post Treatment  room air  -SV     Pre Patient Position  Supine  -SV     Intra Patient Position  Standing  -SV     Post Patient Position  Supine  -SV     Row Name 03/13/21 1628          Positioning and Restraints    Pre-Treatment Position  in bed  -SV     Post Treatment Position  bed  -SV     In Bed  with nsg;call light within reach;encouraged to call for assist;exit alarm on  -SV       User Key  (r) = Recorded By, (t) = Taken By, (c) = Cosigned By    Initials Name Provider Type    SV Krystina Beltrán PT Physical Therapist        Outcome Measures     Row Name 03/13/21 1630          Modified [place] Scale    Modified [place] Scale  4 - Moderately severe disability.  Unable to walk without assistance, and unable to attend to own bodily needs without assistance.  -SV     Row Name 03/13/21 1630          Functional Assessment    Outcome Measure Options  Modified [place]  -       User Key  (r) = Recorded By, (t) = Taken By, (c) = Cosigned By    Initials Name Provider Type    Krystina Garzon PT Physical Therapist        Physical Therapy Education                 Title: PT OT SLP  Therapies (Done)     Topic: Physical Therapy (Done)     Point: Mobility training (Done)     Learning Progress Summary           Patient Acceptance, E,TB,D, VU,NR by  at 3/13/2021 1632                   Point: Home exercise program (Done)     Learning Progress Summary           Patient Acceptance, E,TB,D, VU,NR by  at 3/13/2021 1632                               User Key     Initials Effective Dates Name Provider Type Discipline     04/03/18 -  Krystina Beltrán, PT Physical Therapist PT              PT Recommendation and Plan  Planned Therapy Interventions (PT): balance training, bed mobility training, gait training, home exercise program, transfer training, stretching, strengthening, stair training, ROM (range of motion), patient/family education  Plan of Care Reviewed With: patient     Time Calculation:   PT Charges     Row Name 03/13/21 1639             Time Calculation    Start Time  1053  -      Stop Time  1117  -      Time Calculation (min)  24 min  -      PT Received On  03/13/21  -      PT - Next Appointment  03/14/21  -        User Key  (r) = Recorded By, (t) = Taken By, (c) = Cosigned By    Initials Name Provider Type     Krystina Beltrán, PT Physical Therapist        Therapy Charges for Today     Code Description Service Date Service Provider Modifiers Qty    69608379888 HC PT EVAL MOD COMPLEXITY 3 3/13/2021 Krystina Beltrán, PT GP 1    [id number]  PT THER SUPP EA 15 MIN 3/13/2021 Krystina Beltrán, PT GP 3    52092845033 HC PT THERAPEUTIC ACT EA 15 MIN 3/13/2021 Krystina Beltrán, PT GP 1          PT G-Codes  Outcome Measure Options: Modified Hardy  Modified Hardy Scale: 4 - Moderately severe disability.  Unable to walk without assistance, and unable to attend to own bodily needs without assistance.    Krystina Beltrán PT  3/13/2021

## 2021-03-13 NOTE — PLAN OF CARE
Problem: Adult Inpatient Plan of Care  Goal: Plan of Care Review  Outcome: Ongoing, Progressing  Flowsheets (Taken 3/13/2021 0557)  Progress: improving  Plan of Care Reviewed With: patient  Outcome Summary: Difficult to control blood pressures all night. Goal to keep SBP less than 140. Maxed on cardene and cardizem, PRN hydralazine and labetalol given. SBP <140 around 0100, attempting to wean from cardizem. Precedex at 0.9mcg, weaning to 0.4mcg by 7am per order. SR/ST- 80's-100's. RA. Patient is oriented x4, anxious and restless, moves all extremities, no weakness, some aphasia noted. Failed RN bedside swallow, speech consult in. NPO.

## 2021-03-13 NOTE — THERAPY EVALUATION
Acute Care - Speech Language Pathology   Swallow Initial Evaluation [facility]     Patient Name: Jose R Dalton  : 1960  MRN: [id number]  Today's Date: 3/13/2021               Admit Date: 3/7/2021    Visit Dx:     ICD-10-CM ICD-9-CM   1. Cerebrovascular accident (CVA), unspecified mechanism (CMS/HCC)  I63.9 434.91     Patient Active Problem List   Diagnosis   • Cerebrovascular accident (CVA) (CMS/HCC)     Past Medical History:   Diagnosis Date   • Elevated cholesterol    • Hypertension    • Obesity    • Seizure (CMS/HCC)    • Seizures (CMS/HCC)    • Stroke (CMS/HCC)      Past Surgical History:   Procedure Laterality Date   • CAROTID ENDARTERECTOMY Right    • CAROTID ENDARTERECTOMY Right    • EMBOLECTOMY N/A 3/7/2021    Procedure: MECHANICAL EMBOLECTOMY, WITH LEFT CAROTID ARTERY ANGIOPLASTY AND STENT PLACEMENT;  Surgeon: Nick Washington MD;  Location: [facility] ;  Service: Neurosurgery;  Laterality: N/A;   Patient was not wearing a face mask during this therapy encounter. Therapist used appropriate personal protective equipment including mask, eye protection and gloves.  Mask used was standard procedure mask. Appropriate PPE was worn during the entire therapy session. Hand hygiene was completed before and after therapy session. Patient is not in enhanced droplet precautions.                SWALLOW EVALUATION (last 72 hours)      SLP Adult Swallow Evaluation     Row Name 21 0800                   Rehab Evaluation    Document Type  evaluation  -LS        Patient Observations  alert;cooperative;agree to therapy  -LS        Care Plan Review  care plan/treatment goals reviewed  -LS        Patient Effort  good  -LS           General Information    Patient Profile Reviewed  yes  -LS        Pertinent History Of Current Problem  60 yr old with hx of epilepsy, prior RMCA stroke admitted due to rightside weakness, aphasia.  -LS        Current Method of Nutrition  NPO  -LS         Precautions/Limitations, Vision  WFL;for purposes of eval  -LS        Precautions/Limitations, Hearing  WFL;for purposes of eval  -LS        Prior Level of Function-Communication  WFL  -LS        Prior Level of Function-Swallowing  no diet consistency restrictions  -LS        Plans/Goals Discussed with  patient  -LS        Barriers to Rehab  medically complex  -LS        Patient's Goals for Discharge  return home;return to PO diet  -LS           General Eating/Swallowing Observations    Respiratory Support Currently in Use  room air  -LS        Eating/Swallowing Skills  fed by SLP  -LS        Positioning During Eating  upright 90 degree  -LS        Utensils Used  spoon;cup  -LS        Consistencies Trialed  pureed;thin liquids;nectar/syrup-thick liquids;honey-thick liquids  -LS           Respiratory    Respiratory Status  room air  -LS           Clinical Swallow Eval    Oral Prep Phase  WFL  -LS        Oral Transit  WFL  -LS        Oral Residue  WFL  -LS        Pharyngeal Phase  suspected pharyngeal impairment  -           Recommendations    Therapy Frequency (Swallow)  PRN  -LS        Predicted Duration Therapy Intervention (Days)  until discharge  -        SLP Diet Recommendation  NPO Meds crushed in pudding HTL wash. double sweallow.  -LS        Recommended Diagnostics  VFSS (MBS)  -LS        Recommended Precautions and Strategies  upright posture during/after eating;small bites of food and sips of liquid  -LS        Oral Care Recommendations  Oral Care BID/PRN  -LS        SLP Rec. for Method of Medication Administration  meds crushed;with pudding or applesauce  -LS        Monitor for Signs of Aspiration  yes;notify SLP if any concerns  -LS        Anticipated Discharge Disposition (SLP)  unknown  -LS           Swallow Goals (SLP)    Oral Nutrition/Hydration Goal Selection (SLP)  oral nutrition/hydration, SLP goal 1  -LS           Oral Nutrition/Hydration Goal 1 (SLP)    Time Frame (Oral Nutrition/Hydration  Goal 1, SLP)  by discharge  -          User Key  (r) = Recorded By, (t) = Taken By, (c) = Cosigned By    Initials Name Effective Dates    PEDRO PABLO Reid Cosmealli MS PARKER-SLP 06/08/18 -           EDUCATION  The patient has been educated in the following areas:   Dysphagia (Swallowing Impairment).    SLP Recommendation and Plan     SLP Diet Recommendation: NPO (Meds crushed in pudding HTL wash. double sweallow.)  Recommended Precautions and Strategies: upright posture during/after eating, small bites of food and sips of liquid  SLP Rec. for Method of Medication Administration: meds crushed, with pudding or applesauce     Monitor for Signs of Aspiration: yes, notify SLP if any concerns  Recommended Diagnostics: VFSS (MBS)     Anticipated Discharge Disposition (SLP): unknown     Therapy Frequency (Swallow): PRN  Predicted Duration Therapy Intervention (Days): until discharge                         Plan of Care Reviewed With: patient, other (see comments) (RN made aware.)  Outcome Summary: B    SLP GOALS     Row Name 03/13/21 0800             Oral Nutrition/Hydration Goal 1 (SLP)    Time Frame (Oral Nutrition/Hydration Goal 1, SLP)  by discharge  -        User Key  (r) = Recorded By, (t) = Taken By, (c) = Cosigned By    Initials Name Provider Type    Fe Gilman MS CCC-SLP Speech and Language Pathologist           SLP Outcome Measures (last 72 hours)      SLP Outcome Measures     Row Name 03/13/21 0800             SLP Outcome Measures    Outcome Measure Used?  Adult NOMS  -LS         Adult FCM Scores    FCM Chosen  Swallowing  -LS      Swallowing FCM Score  1  -        User Key  (r) = Recorded By, (t) = Taken By, (c) = Cosigned By    Initials Name Effective Dates    PEDRO PABLO Reid Fe MS PARKER-SLP 06/08/18 -            Time Calculation:   Time Calculation- SLP     Row Name 03/13/21 0918             Time Calculation- SLP    SLP Received On  03/13/21  -        User Key  (r) = Recorded By, (t) = Taken By, (c) =  Cosigned By    Initials Name Provider Type     Fe Reid MS CCC-SLP Speech and Language Pathologist          Therapy Charges for Today     Code Description Service Date Service Provider Modifiers Qty    [id number]  ST EVAL ORAL PHARYNG SWALLOW 5 3/13/2021 Fe Reid MS CCC-SLP GN 1               Fe Reid MS CCC-SLP  3/13/2021

## 2021-03-14 LAB
ALBUMIN SERPL-MCNC: 3.4 G/DL (ref 3.5–5.2)
ANION GAP SERPL CALCULATED.3IONS-SCNC: 11.2 MMOL/L (ref 5–15)
BUN SERPL-MCNC: 38 MG/DL (ref 6–20)
BUN/CREAT SERPL: 38.8 (ref 7–25)
CALCIUM SPEC-SCNC: 8.9 MG/DL (ref 8.6–10.5)
CHLORIDE SERPL-SCNC: 116 MMOL/L (ref 98–107)
CO2 SERPL-SCNC: 22.8 MMOL/L (ref 22–29)
CREAT SERPL-MCNC: 0.98 MG/DL (ref 0.76–1.27)
DEPRECATED RDW RBC AUTO: 43 FL (ref 37–54)
ERYTHROCYTE [DISTWIDTH] IN BLOOD BY AUTOMATED COUNT: 14.3 % (ref 12.3–15.4)
GFR SERPL CREATININE-BSD FRML MDRD: 95 ML/MIN/1.73
GLUCOSE SERPL-MCNC: 146 MG/DL (ref 65–99)
HCT VFR BLD AUTO: 32.7 % (ref 37.5–51)
HGB BLD-MCNC: 10.4 G/DL (ref 13–17.7)
MCH RBC QN AUTO: 26.3 PG (ref 26.6–33)
MCHC RBC AUTO-ENTMCNC: 31.8 G/DL (ref 31.5–35.7)
MCV RBC AUTO: 82.8 FL (ref 79–97)
PHOSPHATE SERPL-MCNC: 3.6 MG/DL (ref 2.5–4.5)
PLATELET # BLD AUTO: 195 10*3/MM3 (ref 140–450)
PMV BLD AUTO: 11.8 FL (ref 6–12)
POTASSIUM SERPL-SCNC: 4 MMOL/L (ref 3.5–5.2)
RBC # BLD AUTO: 3.95 10*6/MM3 (ref 4.14–5.8)
SODIUM SERPL-SCNC: 150 MMOL/L (ref 136–145)
WBC # BLD AUTO: 16.99 10*3/MM3 (ref 3.4–10.8)

## 2021-03-14 PROCEDURE — 93005 ELECTROCARDIOGRAM TRACING: CPT | Performed by: INTERNAL MEDICINE

## 2021-03-14 PROCEDURE — 94799 UNLISTED PULMONARY SVC/PX: CPT

## 2021-03-14 PROCEDURE — 25010000002 LORAZEPAM PER 2 MG: Performed by: INTERNAL MEDICINE

## 2021-03-14 PROCEDURE — 80069 RENAL FUNCTION PANEL: CPT | Performed by: INTERNAL MEDICINE

## 2021-03-14 PROCEDURE — 97530 THERAPEUTIC ACTIVITIES: CPT

## 2021-03-14 PROCEDURE — 93010 ELECTROCARDIOGRAM REPORT: CPT | Performed by: INTERNAL MEDICINE

## 2021-03-14 PROCEDURE — 25010000002 HYDRALAZINE PER 20 MG: Performed by: INTERNAL MEDICINE

## 2021-03-14 PROCEDURE — 25010000002 LORAZEPAM PER 2 MG

## 2021-03-14 PROCEDURE — 85027 COMPLETE CBC AUTOMATED: CPT | Performed by: INTERNAL MEDICINE

## 2021-03-14 RX ORDER — LORAZEPAM 2 MG/ML
1 INJECTION INTRAMUSCULAR ONCE
Status: COMPLETED | OUTPATIENT
Start: 2021-03-14 | End: 2021-03-14

## 2021-03-14 RX ORDER — LORAZEPAM 2 MG/ML
1 INJECTION INTRAMUSCULAR EVERY 4 HOURS PRN
Status: DISCONTINUED | OUTPATIENT
Start: 2021-03-14 | End: 2021-03-19 | Stop reason: HOSPADM

## 2021-03-14 RX ORDER — SODIUM CHLORIDE FOR INHALATION 0.9 %
3 VIAL, NEBULIZER (ML) INHALATION AS NEEDED
Status: DISCONTINUED | OUTPATIENT
Start: 2021-03-14 | End: 2021-03-19 | Stop reason: HOSPADM

## 2021-03-14 RX ORDER — QUETIAPINE FUMARATE 50 MG/1
50 TABLET, FILM COATED ORAL ONCE
Status: COMPLETED | OUTPATIENT
Start: 2021-03-14 | End: 2021-03-14

## 2021-03-14 RX ORDER — CLONIDINE HYDROCHLORIDE 0.1 MG/1
0.3 TABLET ORAL EVERY 8 HOURS SCHEDULED
Status: DISCONTINUED | OUTPATIENT
Start: 2021-03-14 | End: 2021-03-19 | Stop reason: HOSPADM

## 2021-03-14 RX ORDER — QUETIAPINE FUMARATE 25 MG/1
25 TABLET, FILM COATED ORAL ONCE
Status: COMPLETED | OUTPATIENT
Start: 2021-03-14 | End: 2021-03-14

## 2021-03-14 RX ORDER — LORAZEPAM 2 MG/ML
INJECTION INTRAMUSCULAR
Status: COMPLETED
Start: 2021-03-14 | End: 2021-03-14

## 2021-03-14 RX ORDER — HYDRALAZINE HYDROCHLORIDE 50 MG/1
50 TABLET, FILM COATED ORAL EVERY 8 HOURS SCHEDULED
Status: DISCONTINUED | OUTPATIENT
Start: 2021-03-14 | End: 2021-03-16

## 2021-03-14 RX ORDER — ENALAPRILAT 2.5 MG/2ML
1.25 INJECTION INTRAVENOUS EVERY 6 HOURS PRN
Status: DISCONTINUED | OUTPATIENT
Start: 2021-03-14 | End: 2021-03-19 | Stop reason: HOSPADM

## 2021-03-14 RX ADMIN — CHLORHEXIDINE GLUCONATE 15 ML: 1.2 RINSE ORAL at 08:16

## 2021-03-14 RX ADMIN — ENALAPRILAT 1.25 MG: 1.25 INJECTION INTRAVENOUS at 04:05

## 2021-03-14 RX ADMIN — SODIUM CHLORIDE, PRESERVATIVE FREE 10 ML: 5 INJECTION INTRAVENOUS at 08:16

## 2021-03-14 RX ADMIN — CLONIDINE HYDROCHLORIDE 0.3 MG: 0.1 TABLET ORAL at 15:29

## 2021-03-14 RX ADMIN — ALBUTEROL SULFATE 2.5 MG: 2.5 SOLUTION RESPIRATORY (INHALATION) at 08:47

## 2021-03-14 RX ADMIN — ASPIRIN 81 MG: 81 TABLET, CHEWABLE ORAL at 08:16

## 2021-03-14 RX ADMIN — LORAZEPAM 1 MG: 2 INJECTION INTRAMUSCULAR at 09:27

## 2021-03-14 RX ADMIN — HYDRALAZINE HYDROCHLORIDE 50 MG: 50 TABLET, FILM COATED ORAL at 20:40

## 2021-03-14 RX ADMIN — Medication 15 MG/HR: at 04:03

## 2021-03-14 RX ADMIN — LORAZEPAM 1 MG: 2 INJECTION INTRAMUSCULAR; INTRAVENOUS at 09:27

## 2021-03-14 RX ADMIN — TICAGRELOR 90 MG: 90 TABLET ORAL at 08:16

## 2021-03-14 RX ADMIN — HYDRALAZINE HYDROCHLORIDE 50 MG: 50 TABLET, FILM COATED ORAL at 15:30

## 2021-03-14 RX ADMIN — ENALAPRILAT 1.25 MG: 1.25 INJECTION INTRAVENOUS at 18:46

## 2021-03-14 RX ADMIN — HYDRALAZINE HYDROCHLORIDE 100 MG: 50 TABLET, FILM COATED ORAL at 05:38

## 2021-03-14 RX ADMIN — QUETIAPINE FUMARATE 50 MG: 50 TABLET, FILM COATED ORAL at 12:41

## 2021-03-14 RX ADMIN — DEXMEDETOMIDINE HYDROCHLORIDE 1 MCG/KG/HR: 100 INJECTION, SOLUTION, CONCENTRATE INTRAVENOUS at 04:03

## 2021-03-14 RX ADMIN — ISODIUM CHLORIDE 3 ML: 0.03 SOLUTION RESPIRATORY (INHALATION) at 08:48

## 2021-03-14 RX ADMIN — DEXMEDETOMIDINE HYDROCHLORIDE 1.5 MCG/KG/HR: 100 INJECTION, SOLUTION, CONCENTRATE INTRAVENOUS at 01:13

## 2021-03-14 RX ADMIN — CLONIDINE HYDROCHLORIDE 0.3 MG: 0.1 TABLET ORAL at 20:39

## 2021-03-14 RX ADMIN — CLONIDINE HYDROCHLORIDE 0.2 MG: 0.1 TABLET ORAL at 05:38

## 2021-03-14 RX ADMIN — QUETIAPINE FUMARATE 25 MG: 25 TABLET ORAL at 18:06

## 2021-03-14 RX ADMIN — QUETIAPINE FUMARATE 50 MG: 50 TABLET, FILM COATED ORAL at 20:39

## 2021-03-14 RX ADMIN — LORAZEPAM 1 MG: 2 INJECTION INTRAMUSCULAR; INTRAVENOUS at 23:03

## 2021-03-14 RX ADMIN — QUETIAPINE FUMARATE 50 MG: 50 TABLET, FILM COATED ORAL at 08:16

## 2021-03-14 RX ADMIN — HYDRALAZINE HYDROCHLORIDE 20 MG: 20 INJECTION, SOLUTION INTRAMUSCULAR; INTRAVENOUS at 18:09

## 2021-03-14 RX ADMIN — ATORVASTATIN CALCIUM 80 MG: 80 TABLET, FILM COATED ORAL at 21:00

## 2021-03-14 NOTE — PROGRESS NOTES
"  PROGRESS NOTE  Patient Name: Jose R Dalton  Age/Sex: 60 y.o. male  : 1960  MRN: [id number]    Date of Admission: 3/7/2021  Date of Encounter Visit: 21   LOS: 7 days   Patient Care Team:  Provider, No Known as PCP - General  Provider, No Known as PCP - Family Medicine    Chief Complaint: Confusion and agitation, hypertension, aspiration pneumonia, right MCA stroke by history, seizure history, L MCA stroke secondary to LICA occlusion status post TPA with evolving sizable scattered L MCA territory infarct on top of a large old R MCA stroke    Hospital course: Patient is doing better, off the ventilator, on room air for couple of days now, passed his swallow evaluation is allowed to have meds with applesauce.  Off the Precedex drip since last night, off the Cardene and Cardizem drip.  Afebrile.  He is very pleasant awake and responsive but still confused.  He was reported to be more agitated by the nursing staff at the time of this note.  Denies any chest pain or shortness of breath.      REVIEW OF SYSTEMS:   CONSTITUTIONAL: no fever or chills  CARDIOVASCULAR: No chest pain, chest pressure or chest discomfort. No palpitations or edema.   RESPIRATORY: No shortness of breath, cough or sputum.   GASTROINTESTINAL: No anorexia, nausea, vomiting or diarrhea. No abdominal pain or blood.   HEMATOLOGIC: No bleeding or bruising.  Pleasantly confused    Ventilator/Non-Invasive Ventilation Settings (From admission, onward) Comment     Start     Ordered                  Vital Signs  Temp:  [97.7 °F (36.5 °C)-98.8 °F (37.1 °C)] 97.8 °F (36.6 °C)  Heart Rate:  [] 73  Resp:  [16-20] 16  BP: (101-189)/(47-89) 112/71  SpO2:  [90 %-100 %] 95 %  on    Device (Oxygen Therapy): room air    Intake/Output Summary (Last 24 hours) at 3/14/2021 0819  Last data filed at 3/14/2021 0545  Gross per 24 hour   Intake 1362 ml   Output 1050 ml   Net 312 ml     Flowsheet Rows      First Filed Value   Admission Height  185.4 cm (73\") " Documented at 03/07/2021 1113   Admission Weight  127 kg (280 lb 3.2 oz) Documented at 03/07/2021 1111        Body mass index is 32.9 kg/m².      03/08/21  0823 03/09/21  0500 03/10/21  0405   Weight: 117 kg (257 lb) 113 kg (249 lb 5.4 oz) 113 kg (249 lb 5.4 oz)       Physical Exam:  GEN:  No acute distress, awake, pleasantly confused, cooperative, well developed   EYES:   Sclerae clear. No icterus. PERRL. Normal EOM  ENT:   External ears/nose normal, no oral lesions, no thrush, mucous membranes moist  NECK:  Supple, midline trachea, no JVD  LUNGS: Normal chest on inspection, CTAB, no wheezes. No rhonchi. No crackles. Respirations regular, even and unlabored.   CV:  Regular rhythm and rate. Normal S1/S2. No murmurs, gallops, or rubs noted.  ABD:  Soft, nontender and nondistended. Normal bowel sounds. No guarding  EXT:  Moves all extremities well. No cyanosis. No redness. No edema.   Skin: Dry, intact, no bleeding    Results Review:    Results From Last 14 Days   Lab Units 03/12/21  0813 03/08/21  0317   TRIGLYCERIDES mg/dL 117 143     Results from last 7 days   Lab Units 03/14/21  0756 03/13/21  0900 03/12/21  0813 03/11/21  0740 03/10/21  0427 03/09/21  0321 03/08/21  0317 03/07/21  1106   SODIUM mmol/L 150* 146* 145 143 141 142 139 141   POTASSIUM mmol/L 4.0 3.9 3.7 3.8 3.8 4.0 4.4 4.0   CHLORIDE mmol/L 116* 111* 111* 109* 107 109* 109* 107   CO2 mmol/L 22.8 21.1* 23.5 22.8 25.3 21.5* 15.9* 25.9   BUN mg/dL 38* 29* 25* 24* 18 17 16 12   CREATININE mg/dL 0.98 1.13 0.98 1.09 0.97 1.37* 1.40* 0.99   CALCIUM mg/dL 8.9 8.8 8.5* 8.6 8.3* 8.1* 7.8* 8.2*   AST (SGOT) U/L  --   --   --   --  94* 32 12 12   ALT (SGPT) U/L  --   --   --   --  22 8 7 9   ANION GAP mmol/L 11.2 13.9 10.5 11.2 8.7 11.5 14.1 8.1   ALBUMIN g/dL 3.40* 3.50 3.30* 3.30* 3.40* 3.60 3.60 3.80     Results from last 7 days   Lab Units 03/07/21  1106   TROPONIN T ng/mL <0.010             Results from last 7 days   Lab Units 03/14/21  0756 03/12/21  0813  03/11/21  0740 03/10/21  0427 03/09/21  0321 03/08/21  1001 03/08/21  0317 03/07/21  1105   WBC 10*3/mm3 16.99* 10.31 11.32* 12.47* 16.21* 17.64* 13.38* 5.94   HEMOGLOBIN g/dL 10.4* 11.1* 12.3* 11.7* 12.1* 12.5* 12.2* 11.6*   HEMATOCRIT % 32.7* 34.3* 36.5* 36.4* 37.0* 37.9 37.8 35.3*   PLATELETS 10*3/mm3 195 167 156 199 220 212 222 190   MCV fL 82.8 79.6 79.3 79.8 80.8 80.6 80.8 80.6   NEUTROPHIL % %  --  75.9 73.8  --   --  89.7*  --  53.6   LYMPHOCYTE % %  --  9.9* 13.3*  --   --  5.4*  --  32.2   MONOCYTES % %  --  10.0 9.0  --   --  4.3*  --  11.4   EOSINOPHIL % %  --  3.3 3.1  --   --  0.0*  --  2.0   BASOPHIL % %  --  0.3 0.4  --   --  0.1  --  0.5   IMM GRAN % %  --  0.6* 0.4  --   --  0.5  --  0.3     Results from last 7 days   Lab Units 03/08/21  1001 03/07/21  1105   INR  1.35* 1.03   APTT seconds 33.6 33.7         Results from last 7 days   Lab Units 03/12/21  0813 03/08/21  0317   CHOLESTEROL mg/dL  --  227*   TRIGLYCERIDES mg/dL 117 143   HDL CHOL mg/dL  --  54     Results from last 7 days   Lab Units 03/12/21  1201 03/12/21  0418 03/08/21  1121 03/07/21  1620   PH, ARTERIAL pH units 7.525* 7.459* 7.399 7.311*   PCO2, ARTERIAL mm Hg 30.7* 34.1* 32.3* 46.5*   PO2 ART mm Hg 91.6 77.7* 67.9* 56.2*   HCO3 ART mmol/L 25.3 24.2 19.9* 23.4     Results from last 7 days   Lab Units 03/08/21  0317   HEMOGLOBIN A1C % 5.90*     Glucose   Date/Time Value Ref Range Status   03/12/2021 1655 111 70 - 130 mg/dL Final   03/12/2021 1111 134 (H) 70 - 130 mg/dL Final   03/12/2021 0557 149 (H) 70 - 130 mg/dL Final   03/11/2021 2350 128 70 - 130 mg/dL Final   03/11/2021 1747 109 70 - 130 mg/dL Final   03/11/2021 1103 129 70 - 130 mg/dL Final         Results from last 7 days   Lab Units 03/07/21  2142   BLOODCX  No growth at 5 days  No growth at 5 days         Results from last 7 days   Lab Units 03/07/21  1051   COVID19  Not Detected               Imaging:   Imaging Results (All)        I reviewed the patient's new clinical  results.  I personally viewed and interpreted the patient's imaging results: No obvious infiltrate or focal process        Medication Review:   aspirin, 81 mg, Nasogastric, Daily  atorvastatin, 80 mg, Nasogastric, Nightly  chlorhexidine, 15 mL, Mouth/Throat, Q12H  cloNIDine, 0.2 mg, Oral, Q8H  enalaprilat, 1.25 mg, Intravenous, Q6H  hydrALAZINE, 100 mg, Nasogastric, Q8H  QUEtiapine, 50 mg, Nasogastric, Q12H  sodium chloride, 3 mL, Nebulization, TID - RT  ticagrelor, 90 mg, Oral, Daily        dexmedetomidine, 0.2-1.5 mcg/kg/hr, Last Rate: Stopped (03/14/21 0823)  dilTIAZem, 5-15 mg/hr, Last Rate: Stopped (03/14/21 0853)        ASSESSMENT:   1. Aspiration pneumonia  2. Acute stroke likely secondary to left carotid occlusion status post TPA and stenting  3. History of right MCA stroke previously  4. Abnormal chest x-ray-question mediastinal mass or hilar mass send for CT chest  5. HTN  6. HLD  7. Seizure history  8. Smoker  9. H/o Polysubstance abuse  10. MARIO  11. Metabolic acidosis improved  12. Leukocytosis  13. Routine mgmt of [facility] ventilation     PLAN:    Patient is off the Precedex drip, still having some episode of agitation and will try to control with as needed medication and keep him off the Precedex drip  He is going to transfer out of the intensive care unit  We will change his mucolytic therapy to only as needed since he does not have any chronic rhonchi or ongoing mucus retention that I am suspecting that he has a good cough reflex  White count is up today and we will continue to monitor, no need to adjustment or initiation of any antibiotics since the patient is afebrile  His renal function is down to normal sodium is up but still within acceptable level at 150  Blood pressure is much better controlled on the above regimen, we will reduce the hydralazine and increase the clonidine to provide more help with the agitation  Transfer out of the ICU to a telemetry unit    Discussed with patient and the nursing  staff at the bedside    Disposition:     Safia Quiroz MD  03/14/21  08:57 EDT         Dictated utilizing Dragon dictation

## 2021-03-14 NOTE — THERAPY TREATMENT NOTE
Patient Name: Jose R Dalton  : 1960    MRN: [id number]                              Today's Date: 3/14/2021       Admit Date: 3/7/2021    Visit Dx:     ICD-10-CM ICD-9-CM   1. Cerebrovascular accident (CVA), unspecified mechanism (CMS/HCC)  I63.9 434.91     Patient Active Problem List   Diagnosis   • Cerebrovascular accident (CVA) (CMS/HCC)     Past Medical History:   Diagnosis Date   • Elevated cholesterol    • Hypertension    • Obesity    • Seizure (CMS/HCC)    • Seizures (CMS/HCC)    • Stroke (CMS/HCC)      Past Surgical History:   Procedure Laterality Date   • CAROTID ENDARTERECTOMY Right    • CAROTID ENDARTERECTOMY Right    • EMBOLECTOMY N/A 3/7/2021    Procedure: MECHANICAL EMBOLECTOMY, WITH LEFT CAROTID ARTERY ANGIOPLASTY AND STENT PLACEMENT;  Surgeon: Nick Washington MD;  Location: [facility] ;  Service: Neurosurgery;  Laterality: N/A;     General Information     Row Name 21 1615          Physical Therapy Time and Intention    Document Type  therapy note (daily note)  -     Mode of Treatment  individual therapy;physical therapy  -     Row Name 21 1615          General Information    Patient Profile Reviewed  yes  -SV       User Key  (r) = Recorded By, (t) = Taken By, (c) = Cosigned By    Initials Name Provider Type    SV Krystina Beltrán, PT Physical Therapist        Mobility     Row Name 21 1636          Bed Mobility    Supine-Sit [place] (Bed Mobility)  minimum assist (75% patient effort);verbal cues;nonverbal cues (demo/gesture)  -     Sit-Supine [place] (Bed Mobility)  minimum assist (75% patient effort);verbal cues;nonverbal cues (demo/gesture)  -SV     Row Name 21 1636          Transfers    Comment (Transfers)  HHA  -SV     Row Name 21 1636          Sit-Stand Transfer    Sit-Stand [place] (Transfers)  contact guard  -     Row Name 21 1636          Gait/Stairs (Locomotion)    [place] Level (Gait)  contact guard;minimum  assist (75% patient effort);2 person assist  -SV     Distance in Feet (Gait)  HHA of 1 400' with scissoring steps at times, narrow aung, unsteady, amb with asst of 2 due to highly impulsive  -SV       User Key  (r) = Recorded By, (t) = Taken By, (c) = Cosigned By    Initials Name Provider Type    Krystina Garzon, PT Physical Therapist        Obj/Interventions    No documentation.       Goals/Plan    No documentation.       Clinical Impression     Row Name 03/14/21 1637          Pain Scale: Numbers Pre/Post-Treatment    Pretreatment Pain Rating  0/10 - no pain  -SV     Posttreatment Pain Rating  0/10 - no pain  -SV     Row Name 03/14/21 1637          Plan of Care Review    Plan of Care Reviewed With  daughter  -SV     Row Name 03/14/21 1637          Vital Signs    O2 Delivery Pre Treatment  room air  -SV     O2 Delivery Intra Treatment  room air  -SV     O2 Delivery Post Treatment  room air  -SV     Recovery Time  Pt removed monitor: RN to reapply  -SV     Row Name 03/14/21 1637          Positioning and Restraints    Pre-Treatment Position  in bed  -SV     Post Treatment Position  bed  -SV     In Bed  notified nsg;encouraged to call for assist;exit alarm on;with family/caregiver;fowlers  -SV       User Key  (r) = Recorded By, (t) = Taken By, (c) = Cosigned By    Initials Name Provider Type    Krystina Garzon, PT Physical Therapist        Outcome Measures     Row Name 03/14/21 1638          Modified [place] Scale    Modified [place] Scale  4 - Moderately severe disability.  Unable to walk without assistance, and unable to attend to own bodily needs without assistance.  -SV     Row Name 03/14/21 1638          Functional Assessment    Outcome Measure Options  Modified [place]  -SV       User Key  (r) = Recorded By, (t) = Taken By, (c) = Cosigned By    Initials Name Provider Type    Krystina Garzon, PT Physical Therapist        Physical Therapy Education                 Title: PT OT SLP Therapies (Done)      Topic: Physical Therapy (Done)     Point: Mobility training (Done)     Learning Progress Summary           Patient Acceptance, E,TB,D, VU,NR by  at 3/13/2021 1632                   Point: Home exercise program (Done)     Learning Progress Summary           Patient Acceptance, E,TB,D, VU,NR by  at 3/13/2021 1632                               User Key     Initials Effective Dates Name Provider Type Discipline     04/03/18 -  Krystina Beltrán, PT Physical Therapist PT              PT Recommendation and Plan  Planned Therapy Interventions (PT): balance training, bed mobility training, gait training, home exercise program, transfer training, stretching, strengthening, stair training, ROM (range of motion), patient/family education  Plan of Care Reviewed With: daughter     Time Calculation:   PT Charges     Row Name 03/14/21 1642             Time Calculation    Start Time  1615  -      Stop Time  1630  -      Time Calculation (min)  15 min  -      PT Received On  03/14/21  -      PT - Next Appointment  03/15/21  -        User Key  (r) = Recorded By, (t) = Taken By, (c) = Cosigned By    Initials Name Provider Type     Krystina Beltrán, PT Physical Therapist        Therapy Charges for Today     Code Description Service Date Service Provider Modifiers Qty    36734768870 HC PT EVAL MOD COMPLEXITY 3 3/13/2021 Krystina Beltrán, PT GP 1    86351488687 HC PT THER SUPP EA 15 MIN 3/13/2021 Krystina Beltrán, PT GP 3    44183277641 HC PT THERAPEUTIC ACT EA 15 MIN 3/13/2021 Krystina Beltrán, PT GP 1    25671043477 HC PT THER SUPP EA 15 MIN 3/14/2021 Krystina Beltrán, PT GP 1    54203349226 HC PT THERAPEUTIC ACT EA 15 MIN 3/14/2021 Krystina Beltrán, PT GP 1          PT G-Codes  Outcome Measure Options: Modified [place]  Modified [place] Scale: 4 - Moderately severe disability.  Unable to walk without assistance, and unable to attend to own bodily needs without assistance.    Krystina Beltrán PT  3/14/2021

## 2021-03-14 NOTE — PLAN OF CARE
Problem: Adult Inpatient Plan of Care  Goal: Plan of Care Review  Outcome: Ongoing, Progressing  Flowsheets  Taken 3/14/2021 0703 by Patsy Tolbert RN  Progress: no change  Outcome Summary: Orientedx4 on and off, confusion at times. SBP more controlled tonight, 100's-150's, increases on agitation. All other VSS. Around 0130 patient became confused and demanding water or he will leave. Precedex gtt started to keep calm, weaned back to 0.4mcg per order. No c/o pain.  Taken 3/13/2021 1628 by Krystina Beltrán, PT  Plan of Care Reviewed With: patient     Problem: Adult Inpatient Plan of Care  Goal: Absence of Hospital-Acquired Illness or Injury  Intervention: Prevent and Manage VTE (venous thromboembolism) Risk  Recent Flowsheet Documentation  Taken 3/14/2021 0400 by Patsy Tolbert RN  VTE Prevention/Management:   bilateral   dorsiflexion/plantar flexion performed   sequential compression devices on  Taken 3/14/2021 0000 by Patsy Tolbert RN  VTE Prevention/Management:   bilateral   dorsiflexion/plantar flexion performed   sequential compression devices on  Taken 3/13/2021 2000 by Patsy Tolbert RN  VTE Prevention/Management:   bilateral   dorsiflexion/plantar flexion performed   sequential compression devices on     Problem: Eating/Swallowing Impairment (Stroke, Ischemic/Transient Ischemic Attack)  Goal: Oral Intake without Aspiration  Outcome: Ongoing, Progressing  Intervention: Optimize Eating and Swallowing  Recent Flowsheet Documentation  Taken 3/14/2021 0400 by Patsy Tolbert RN  Aspiration Precautions:   oral hygiene care promoted   respiratory status monitored   upright posture maintained   liquids thickened   awake/alert before oral intake  Taken 3/13/2021 2000 by Patsy Tolbert RN  Aspiration Precautions:   oral hygiene care promoted   respiratory status monitored   upright posture maintained

## 2021-03-14 NOTE — PROGRESS NOTES
Patient now extubated and starting therapies, first sessions of PT and ST noted. Will monitor for progress with therapies and confirm discharge plans for determination of rehab needs and appropriateness.    Cely Meraz RN  Rehab Admissions Nurse  [phone]

## 2021-03-14 NOTE — PLAN OF CARE
Goal Outcome Evaluation:  Plan of Care Reviewed With: daughter      Pt alert and cooperative today but appears very impulsive. Tactile cues along with verbal cues due to pt does not stay on task. Fixated on water or [place] due to thirty. Pt min asst to eob due to not following commands. CGA STS HHA. Pt amb with min/cga HHA plus one due to gait deviations, unsteadiness, impulsivity. Pt is at high risk for falls at this time. REcommend standing and balance activities as able tomorrow.

## 2021-03-15 LAB
ALBUMIN SERPL-MCNC: 4 G/DL (ref 3.5–5.2)
ANION GAP SERPL CALCULATED.3IONS-SCNC: 14 MMOL/L (ref 5–15)
BUN SERPL-MCNC: 40 MG/DL (ref 6–20)
BUN/CREAT SERPL: 29 (ref 7–25)
CALCIUM SPEC-SCNC: 9.3 MG/DL (ref 8.6–10.5)
CHLORIDE SERPL-SCNC: 115 MMOL/L (ref 98–107)
CO2 SERPL-SCNC: 24 MMOL/L (ref 22–29)
CREAT SERPL-MCNC: 1.38 MG/DL (ref 0.76–1.27)
GFR SERPL CREATININE-BSD FRML MDRD: 64 ML/MIN/1.73
GLUCOSE SERPL-MCNC: 108 MG/DL (ref 65–99)
PHOSPHATE SERPL-MCNC: 3.6 MG/DL (ref 2.5–4.5)
POTASSIUM SERPL-SCNC: 3.2 MMOL/L (ref 3.5–5.2)
QT INTERVAL: 412 MS
SODIUM SERPL-SCNC: 153 MMOL/L (ref 136–145)

## 2021-03-15 PROCEDURE — 25010000002 LORAZEPAM PER 2 MG: Performed by: INTERNAL MEDICINE

## 2021-03-15 PROCEDURE — 92526 ORAL FUNCTION THERAPY: CPT

## 2021-03-15 PROCEDURE — 25010000002 HYDRALAZINE PER 20 MG: Performed by: INTERNAL MEDICINE

## 2021-03-15 PROCEDURE — 97116 GAIT TRAINING THERAPY: CPT

## 2021-03-15 PROCEDURE — 80069 RENAL FUNCTION PANEL: CPT | Performed by: INTERNAL MEDICINE

## 2021-03-15 RX ORDER — NICOTINE 21 MG/24HR
1 PATCH, TRANSDERMAL 24 HOURS TRANSDERMAL
Status: DISCONTINUED | OUTPATIENT
Start: 2021-03-15 | End: 2021-03-19 | Stop reason: HOSPADM

## 2021-03-15 RX ADMIN — HYDRALAZINE HYDROCHLORIDE 20 MG: 20 INJECTION, SOLUTION INTRAMUSCULAR; INTRAVENOUS at 17:46

## 2021-03-15 RX ADMIN — LORAZEPAM 1 MG: 2 INJECTION INTRAMUSCULAR; INTRAVENOUS at 07:47

## 2021-03-15 RX ADMIN — CLONIDINE HYDROCHLORIDE 0.3 MG: 0.1 TABLET ORAL at 22:02

## 2021-03-15 RX ADMIN — HYDRALAZINE HYDROCHLORIDE 50 MG: 50 TABLET, FILM COATED ORAL at 05:59

## 2021-03-15 RX ADMIN — LORAZEPAM 1 MG: 2 INJECTION INTRAMUSCULAR; INTRAVENOUS at 02:45

## 2021-03-15 RX ADMIN — ATORVASTATIN CALCIUM 80 MG: 80 TABLET, FILM COATED ORAL at 20:14

## 2021-03-15 RX ADMIN — QUETIAPINE FUMARATE 50 MG: 50 TABLET, FILM COATED ORAL at 20:14

## 2021-03-15 RX ADMIN — CLONIDINE HYDROCHLORIDE 0.3 MG: 0.1 TABLET ORAL at 15:47

## 2021-03-15 RX ADMIN — QUETIAPINE FUMARATE 50 MG: 50 TABLET, FILM COATED ORAL at 10:04

## 2021-03-15 RX ADMIN — TICAGRELOR 90 MG: 90 TABLET ORAL at 10:03

## 2021-03-15 RX ADMIN — CLONIDINE HYDROCHLORIDE 0.3 MG: 0.1 TABLET ORAL at 05:59

## 2021-03-15 RX ADMIN — HYDRALAZINE HYDROCHLORIDE 50 MG: 50 TABLET, FILM COATED ORAL at 23:04

## 2021-03-15 RX ADMIN — LABETALOL HYDROCHLORIDE 20 MG: 5 INJECTION, SOLUTION INTRAVENOUS at 20:15

## 2021-03-15 RX ADMIN — HYDRALAZINE HYDROCHLORIDE 50 MG: 50 TABLET, FILM COATED ORAL at 15:47

## 2021-03-15 RX ADMIN — ASPIRIN 81 MG: 81 TABLET, CHEWABLE ORAL at 10:03

## 2021-03-15 NOTE — THERAPY RE-EVALUATION
Acute Care - Speech Language Pathology   Swallow Re-Evaluation [facility]     Patient Name: Jose R Dalton  : 1960  MRN: [id number]  Today's Date: 3/15/2021               Admit Date: 3/7/2021    Visit Dx:     ICD-10-CM ICD-9-CM   1. Cerebrovascular accident (CVA), unspecified mechanism (CMS/HCC)  I63.9 434.91     Patient Active Problem List   Diagnosis   • Cerebrovascular accident (CVA) (CMS/HCC)     Past Medical History:   Diagnosis Date   • Elevated cholesterol    • Hypertension    • Obesity    • Seizure (CMS/HCC)    • Seizures (CMS/HCC)    • Stroke (CMS/HCC)      Past Surgical History:   Procedure Laterality Date   • CAROTID ENDARTERECTOMY Right    • CAROTID ENDARTERECTOMY Right    • EMBOLECTOMY N/A 3/7/2021    Procedure: MECHANICAL EMBOLECTOMY, WITH LEFT CAROTID ARTERY ANGIOPLASTY AND STENT PLACEMENT;  Surgeon: Nick Washington MD;  Location: [facility] ;  Service: Neurosurgery;  Laterality: N/A;     Patient was not wearing a face mask during this therapy encounter. Therapist used appropriate personal protective equipment including mask, eye protection and gloves.  Mask used was standard procedure mask. Appropriate PPE was worn during the entire therapy session. Hand hygiene was completed before and after therapy session. Patient is not in enhanced droplet precautions.        SWALLOW EVALUATION (last 72 hours)      SLP Adult Swallow Evaluation     Row Name 03/15/21 1600 21 0800                Rehab Evaluation    Document Type  re-evaluation  -AW  evaluation  -LS       Subjective Information  no complaints  -AW  --       Patient Observations  alert;cooperative;lethargic  -AW  alert;cooperative;agree to therapy  -LS       Patient/Family/Caregiver Comments/Observations  Pt in wrist and vest restraints, ex-wife present. Pt confused, needing cues to stay awake at times.  -AW  --       Care Plan Review  evaluation/treatment results reviewed;patient/other agree to care plan  -AW  care  plan/treatment goals reviewed  -LS       Care Plan Review, Other Participant(s)  other (see comments) ex-wife  -AW  --       Patient Effort  adequate  -AW  good  -LS       Symptoms Noted During/After Treatment  fatigue  -AW  --          General Information    Patient Profile Reviewed  yes  -AW  yes  -LS       Pertinent History Of Current Problem  Pt admitted with LMCA CVA, s/p tPA and stenting, intubated 3/7 - 3/12/21. Pt has a h/o RMCA stroke.  -AW  60 yr old with hx of epilepsy, prior RMCA stroke admitted due to rightside weakness, aphasia.  -LS       Current Method of Nutrition  NPO  -AW  NPO  -LS       Precautions/Limitations, Vision  WFL;for purposes of eval  -AW  WFL;for purposes of eval  -LS       Precautions/Limitations, Hearing  WFL;for purposes of eval  -AW  WFL;for purposes of eval  -LS       Prior Level of Function-Communication  WFL  -AW  WFL  -LS       Prior Level of Function-Swallowing  no diet consistency restrictions  -AW  no diet consistency restrictions  -LS       Plans/Goals Discussed with  patient;family;agreed upon  -AW  patient  -LS       Barriers to Rehab  medically complex  -AW  medically complex  -LS       Patient's Goals for Discharge  return to PO diet  -AW  return home;return to PO diet  -LS       Family Goals for Discharge  patient able to return to PO diet  -AW  --          Pain    Additional Documentation  Pain Scale: Numbers Pre/Post-Treatment (Group)  -AW  --          Pain Scale: Numbers Pre/Post-Treatment    Pretreatment Pain Rating  0/10 - no pain  -AW  --       Posttreatment Pain Rating  0/10 - no pain  -AW  --          Oral Motor Structure and Function    Dentition Assessment  edentulous, dentures not available;other (see comments) family to bring  -AW  --       Secretion Management  WNL/WFL  -AW  --       Mucosal Quality  moist, healthy  -AW  --       Volitional Swallow  unable to elicit  -AW  --          Oral Musculature and Cranial Nerve Assessment    Oral Labial or Buccal  Impairment, Detail, Cranial Nerve VII (Facial):  right labial droop  -AW  --          General Eating/Swallowing Observations    Respiratory Support Currently in Use  room air  -AW  room air  -LS       Eating/Swallowing Skills  fed by SLP  -AW  fed by SLP  -LS       Positioning During Eating  upright in bed  -AW  upright 90 degree  -LS       Utensils Used  spoon;cup;straw  -AW  spoon;cup  -LS       Consistencies Trialed  ice chips;thin liquids;nectar/syrup-thick liquids;pureed  -AW  pureed;thin liquids;nectar/syrup-thick liquids;honey-thick liquids  -LS          Respiratory    Respiratory Status  --  room air  -LS          Clinical Swallow Eval    Oral Prep Phase  --  WFL  -LS       Oral Transit  impaired  -AW  WFL  -LS       Oral Residue  --  WFL  -LS       Pharyngeal Phase  suspected pharyngeal impairment  -AW  suspected pharyngeal impairment  -LS       Clinical Swallow Evaluation Summary  Swallow elicited with ice chip. Swallow delayed, laryngeal elevation adequate with palpation. Pt had delayed coughing after thins via cup, suspect residuals. No s/s noted with NTL (cup/straw) or pureed. Pt needed cues to stay awake during eval and did not have dentures, therefore, solids were not trialed.   -AW  --          Recommendations    Therapy Frequency (Swallow)  PRN  -AW  PRN  -LS       Predicted Duration Therapy Intervention (Days)  --  until discharge  -LS       SLP Diet Recommendation  puree;nectar thick liquids  -AW  NPO Meds crushed in pudding HTL wash. double sweallow.  -LS       Recommended Diagnostics  --  VFSS (MBS)  -       Recommended Precautions and Strategies  upright posture during/after eating;small bites of food and sips of liquid;1:1 supervision;assist with feeding  -AW  upright posture during/after eating;small bites of food and sips of liquid  -       Oral Care Recommendations  Oral Care before breakfast, after meals and PRN  -AW  Oral Care BID/PRN  -LS       SLP Rec. for Method of Medication  Administration  meds crushed;with pudding or applesauce  -AW  meds crushed;with pudding or applesauce  -LS       Monitor for Signs of Aspiration  yes;notify SLP if any concerns  -AW  yes;notify SLP if any concerns  -LS       Anticipated Discharge Disposition (SLP)  unknown  -AW  unknown  -LS          Swallow Goals (SLP)    Oral Nutrition/Hydration Goal Selection (SLP)  --  oral nutrition/hydration, SLP goal 1  -LS          Oral Nutrition/Hydration Goal 1 (SLP)    Time Frame (Oral Nutrition/Hydration Goal 1, SLP)  --  by discharge  -LS         User Key  (r) = Recorded By, (t) = Taken By, (c) = Cosigned By    Initials Name Effective Dates    LS Fe Reid, MS [facility]-SLP 06/08/18 -     AW Diana Bello, MS CCC-SLP 06/08/18 -           EDUCATION  The patient has been educated in the following areas:   Dysphagia (Swallowing Impairment) Oral Care/Hydration Modified Diet Instruction.    SLP Recommendation and Plan     SLP Diet Recommendation: puree, nectar thick liquids  Recommended Precautions and Strategies: upright posture during/after eating, small bites of food and sips of liquid, 1:1 supervision, assist with feeding  SLP Rec. for Method of Medication Administration: meds crushed, with pudding or applesauce     Monitor for Signs of Aspiration: yes, notify SLP if any concerns        Anticipated Discharge Disposition (SLP): unknown     Therapy Frequency (Swallow): PRN                            Plan of Care Reviewed With: patient, family  Outcome Summary: Swallow reevaluated. VFSS this am was canceled due to pt's confusion and being in restraints. Pt needed cues to participate and stay alert during eval. Pt showed s/s with thins. Swallow is delayed.  No s/s with pureed or NTL. Solids were not trialed due to pt's fatigue. Family to bring in dentures. Recommend pureed w/ NTL ; meds crushed in pureed; upright for meals and 30 min after; slow rate; small bites/sips; supervision wtih meals and assist with feeding. ST will  follow for diet tolerance and reeval as indicated.    SLP GOALS     Row Name 03/13/21 0800             Oral Nutrition/Hydration Goal 1 (SLP)    Time Frame (Oral Nutrition/Hydration Goal 1, SLP)  by discharge  -LS        User Key  (r) = Recorded By, (t) = Taken By, (c) = Cosigned By    Initials Name Provider Type    Fe Gilman MS CCC-SLP Speech and Language Pathologist           SLP Outcome Measures (last 72 hours)      SLP Outcome Measures     Row Name 03/15/21 1600 03/15/21 1100 03/13/21 0800       SLP Outcome Measures    Outcome Measure Used?  Adult NOMS  -AW  Adult NOMS  -AW  Adult NOMS  -LS       Adult FCM Scores    FCM Chosen  Swallowing  -AW  Swallowing  -AW  Swallowing  -LS    Swallowing FCM Score  4  -AW  4  -AW  1  -LS      User Key  (r) = Recorded By, (t) = Taken By, (c) = Cosigned By    Initials Name Effective Dates    LS Fe Reid MS CCC-SLP 06/08/18 -     Diana Charles MS CCC-SLP 06/08/18 -            Time Calculation:   Time Calculation- SLP     Row Name 03/15/21 1647             Time Calculation- SLP    SLP Start Time  1100  -AW      SLP Received On  03/15/21  -AW        User Key  (r) = Recorded By, (t) = Taken By, (c) = Cosigned By    Initials Name Provider Type    Diana Charles MS CCC-SLP Speech and Language Pathologist          Therapy Charges for Today     Code Description Service Date Service Provider Modifiers Qty    99386457564 HC ST TREATMENT SWALLOW 4 3/15/2021 Diana Bello MS CCC-SLP GN 1               Diana Bello MS CCC-SLP  3/15/2021

## 2021-03-15 NOTE — THERAPY TREATMENT NOTE
Patient Name: Jose R Dalton  : 1960    MRN: [id number]                              Today's Date: 3/15/2021       Admit Date: 3/7/2021    Visit Dx:     ICD-10-CM ICD-9-CM   1. Cerebrovascular accident (CVA), unspecified mechanism (CMS/HCC)  I63.9 434.91     Patient Active Problem List   Diagnosis   • Cerebrovascular accident (CVA) (CMS/HCC)     Past Medical History:   Diagnosis Date   • Elevated cholesterol    • Hypertension    • Obesity    • Seizure (CMS/HCC)    • Seizures (CMS/HCC)    • Stroke (CMS/HCC)      Past Surgical History:   Procedure Laterality Date   • CAROTID ENDARTERECTOMY Right    • CAROTID ENDARTERECTOMY Right    • EMBOLECTOMY N/A 3/7/2021    Procedure: MECHANICAL EMBOLECTOMY, WITH LEFT CAROTID ARTERY ANGIOPLASTY AND STENT PLACEMENT;  Surgeon: Nick Washington MD;  Location: [facility] ;  Service: Neurosurgery;  Laterality: N/A;     General Information     Row Name 03/15/21 1214          Physical Therapy Time and Intention    Document Type  therapy note (daily note)  (Pended)   -MG     Mode of Treatment  individual therapy;physical therapy  (Pended)   -MG     Row Name 03/15/21 1214          General Information    Existing Precautions/Restrictions  fall  (Pended)   -MG     Row Name 03/15/21 1214          Cognition    Orientation Status (Cognition)  oriented to;person;disoriented to;place;situation  (Pended)   -MG     Row Name 03/15/21 1214          Safety Issues, Functional Mobility    Safety Issues Affecting Function (Mobility)  ability to follow commands;at risk behavior observed;awareness of need for assistance;impulsivity;judgment;problem-solving  (Pended)   -MG     Impairments Affecting Function (Mobility)  balance;cognition;coordination;endurance/activity tolerance;strength;motor control  (Pended)   -MG     Comment, Safety Issues/Impairments (Mobility)  pt wore gait belt and nonskid socks. pt is impulsive and difficulty following commands  (Pended)   -MG       User Key  (r)  = Recorded By, (t) = Taken By, (c) = Cosigned By    Initials Name Provider Type     Amara Jimenez, PTA Student PTA Student        Mobility     Row Name 03/15/21 1216          Bed Mobility    Supine-Sit Trigg (Bed Mobility)  verbal cues;nonverbal cues (demo/gesture);minimum assist (75% patient effort)  (Pended)   -MG     Sit-Supine Trigg (Bed Mobility)  verbal cues;nonverbal cues (demo/gesture);minimum assist (75% patient effort)  (Pended)   -MG     Assistive Device (Bed Mobility)  bed rails;draw sheet  (Pended)   -MG     Row Name 03/15/21 1216          Sit-Stand Transfer    Sit-Stand Trigg (Transfers)  contact guard;verbal cues;nonverbal cues (demo/gesture)  (Pended)   -MG     Row Name 03/15/21 1216          Gait/Stairs (Locomotion)    Trigg Level (Gait)  minimum assist (75% patient effort);moderate assist (50% patient effort);2 person assist  (Pended)   -MG     Distance in Feet (Gait)  400ft, several LOB noted and req'd Crescencio to correct  (Pended)   -MG     Deviations/Abnormal Patterns (Gait)  ataxic;gait speed decreased;stride length decreased;brian decreased  (Pended)   -MG       User Key  (r) = Recorded By, (t) = Taken By, (c) = Cosigned By    Initials Name Provider Type     Amara Jimenez, PTA Student PTA Student        Obj/Interventions    No documentation.       Goals/Plan    No documentation.       Clinical Impression     Row Name 03/15/21 1218          Plan of Care Review    Plan of Care Reviewed With  patient  (Pended)   -MG     Progress  no change  (Pended)   -MG     Outcome Summary  pt ambulated 400ft with minAx2/modAx2 with HHA. pt had several LOB that req'd Crescencio to correct. pt was very impulsive and distrated when ambulating. pt req'd Crescencio for bed mobility and CGA for STS transfers. unable to do any further exercises due to difficulty following commands. continue to progress pt as able.  (Pended)   -MG     Row Name 03/15/21 1218          Positioning and Restraints     Pre-Treatment Position  in bed  (Pended)   -MG     Post Treatment Position  bed  (Pended)   -MG     In Bed  with nsg;call light within reach;encouraged to call for assist;exit alarm on  (Pended)   -MG       User Key  (r) = Recorded By, (t) = Taken By, (c) = Cosigned By    Initials Name Provider Type    MG Amara Jimenez, KIP Student PTA Student        Outcome Measures     Row Name 03/15/21 1220          Modified Bell Scale    Modified Bell Scale  4 - Moderately severe disability.  Unable to walk without assistance, and unable to attend to own bodily needs without assistance.  (Pended)   -MG       User Key  (r) = Recorded By, (t) = Taken By, (c) = Cosigned By    Initials Name Provider Type    MG Amara Jimenez, KIP Student PTA Student        Physical Therapy Education                 Title: PT OT SLP Therapies (In Progress)     Topic: Physical Therapy (In Progress)     Point: Mobility training (In Progress)     Learning Progress Summary           Patient Acceptance, E,D, NR by  at 3/15/2021 1221    Acceptance, E,TB,D, VU,NR by  at 3/13/2021 1632                   Point: Home exercise program (In Progress)     Learning Progress Summary           Patient Acceptance, E,D, NR by  at 3/15/2021 1221    Acceptance, E,TB,D, VU,NR by  at 3/13/2021 1632                               User Key     Initials Effective Dates Name Provider Type Discipline    SV 04/03/18 -  Krystina Beltrán, PT Physical Therapist PT     02/26/21 -  Amara Jimenez PTA Student PTA Student PT              PT Recommendation and Plan     Plan of Care Reviewed With: (P) patient  Progress: (P) no change  Outcome Summary: (P) pt ambulated 400ft with minAx2/modAx2 with HHA. pt had several LOB that req'd Crescencio to correct. pt was very impulsive and distrated when ambulating. pt req'd Crescencio for bed mobility and CGA for STS transfers. unable to do any further exercises due to difficulty following commands. continue to progress pt as  able.     Time Calculation:   PT Charges     Row Name 03/15/21 1221             Time Calculation    Start Time  1014  (Pended)   -MG      Stop Time  1027  (Pended)   -MG      Time Calculation (min)  13 min  (Pended)   -MG      PT Received On  03/15/21  (Pended)   -MG      PT - Next Appointment  03/16/21  (Pended)   -MG         Time Calculation- PT    Total Timed Code Minutes- PT  13 minute(s)  (Pended)   -MG        User Key  (r) = Recorded By, (t) = Taken By, (c) = Cosigned By    Initials Name Provider Type    MG Amara Jimenez PTA Student PTA Student        Therapy Charges for Today     Code Description Service Date Service Provider Modifiers Qty    33916122959 HC GAIT TRAINING EA 15 MIN 3/15/2021 Amara Jimenez PTA Student GP 1          PT G-Codes  Outcome Measure Options: Modified Ishmael  Modified Ishmael Scale: (P) 4 - Moderately severe disability.  Unable to walk without assistance, and unable to attend to own bodily needs without assistance.    Amara Jimenez PTA Student  3/15/2021

## 2021-03-15 NOTE — PLAN OF CARE
Problem: Adult Inpatient Plan of Care  Goal: Plan of Care Review  Outcome: Ongoing, Progressing  Goal: Patient-Specific Goal (Individualized)  Outcome: Ongoing, Progressing  Goal: Absence of Hospital-Acquired Illness or Injury  Outcome: Ongoing, Progressing  Intervention: Identify and Manage Fall Risk  Recent Flowsheet Documentation  Taken 3/15/2021 1812 by Arjun De La Torre RN  Safety Promotion/Fall Prevention:   activity supervised   clutter free environment maintained   assistive device/personal items within reach   elopement precautions   fall prevention program maintained   gait belt   mobility aid in reach   lighting adjusted   muscle strengthening facilitated   nonskid shoes/slippers when out of bed   room organization consistent   safety round/check completed   toileting scheduled  Taken 3/15/2021 1658 by Arjun De La Torre RN  Safety Promotion/Fall Prevention:   activity supervised   assistive device/personal items within reach   clutter free environment maintained   elopement precautions   gait belt   lighting adjusted   mobility aid in reach   fall prevention program maintained   nonskid shoes/slippers when out of bed   muscle strengthening facilitated   room organization consistent   safety round/check completed   toileting scheduled  Taken 3/15/2021 1410 by Arjun De La Torre RN  Safety Promotion/Fall Prevention:   activity supervised   assistive device/personal items within reach   clutter free environment maintained   elopement precautions   fall prevention program maintained   gait belt   lighting adjusted   mobility aid in reach   muscle strengthening facilitated   nonskid shoes/slippers when out of bed   room organization consistent   safety round/check completed   toileting scheduled  Taken 3/15/2021 1238 by Arjun De La Torre RN  Safety Promotion/Fall Prevention:   activity supervised   assistive device/personal items within reach   clutter free environment maintained   elopement precautions   gait belt    fall prevention program maintained   lighting adjusted   mobility aid in reach   muscle strengthening facilitated   nonskid shoes/slippers when out of bed   room organization consistent   safety round/check completed   toileting scheduled  Taken 3/15/2021 1030 by Arjun De La Torre RN  Safety Promotion/Fall Prevention:   activity supervised   assistive device/personal items within reach   clutter free environment maintained   elopement precautions   fall prevention program maintained   lighting adjusted   mobility aid in reach   gait belt   muscle strengthening facilitated   nonskid shoes/slippers when out of bed   room organization consistent   safety round/check completed   toileting scheduled  Taken 3/15/2021 0807 by Arjun De La Torre RN  Safety Promotion/Fall Prevention:   activity supervised   assistive device/personal items within reach   clutter free environment maintained   elopement precautions   fall prevention program maintained   gait belt   mobility aid in reach   muscle strengthening facilitated   nonskid shoes/slippers when out of bed   lighting adjusted   room organization consistent   safety round/check completed   toileting scheduled  Intervention: Prevent Skin Injury  Recent Flowsheet Documentation  Taken 3/15/2021 1812 by Arjun De La Torre RN  Body Position: position changed independently  Taken 3/15/2021 1658 by Arjun De La Torre RN  Body Position: position changed independently  Taken 3/15/2021 1410 by Arjun De La Torre RN  Body Position: position changed independently  Taken 3/15/2021 1238 by Arjun De La Torre RN  Body Position: position changed independently  Taken 3/15/2021 1030 by Arjun De La Torre RN  Body Position: position changed independently  Taken 3/15/2021 0807 by Arjun De La Torre RN  Body Position: position changed independently  Intervention: Prevent and Manage VTE (venous thromboembolism) Risk  Recent Flowsheet Documentation  Taken 3/15/2021 1812 by Arjun De La Torre RN  VTE  Prevention/Management:   bilateral   sequential compression devices on  Taken 3/15/2021 1658 by Arjun De La Torre RN  VTE Prevention/Management:   bilateral   sequential compression devices on  Taken 3/15/2021 1410 by Arjun De La Torre RN  VTE Prevention/Management:   bilateral   sequential compression devices on  Taken 3/15/2021 1238 by Arjun De La Torre RN  VTE Prevention/Management:   bilateral   sequential compression devices on  Taken 3/15/2021 1030 by Arjun De La Torre RN  VTE Prevention/Management:   bilateral   sequential compression devices on  Taken 3/15/2021 0807 by Arjun De La Torre RN  VTE Prevention/Management:   bilateral   sequential compression devices on  Intervention: Prevent Infection  Recent Flowsheet Documentation  Taken 3/15/2021 1812 by Arjun De La Torre RN  Infection Prevention:   cohorting utilized   environmental surveillance performed   equipment surfaces disinfected   hand hygiene promoted   rest/sleep promoted   personal protective equipment utilized   single patient room provided   visitors restricted/screened  Taken 3/15/2021 1658 by Arjun De La Torre RN  Infection Prevention:   cohorting utilized   environmental surveillance performed   equipment surfaces disinfected   hand hygiene promoted   personal protective equipment utilized   rest/sleep promoted   single patient room provided   visitors restricted/screened  Taken 3/15/2021 1410 by Arujn De La Torre RN  Infection Prevention:   cohorting utilized   environmental surveillance performed   equipment surfaces disinfected   hand hygiene promoted   personal protective equipment utilized   rest/sleep promoted   single patient room provided   visitors restricted/screened  Taken 3/15/2021 1238 by Arjun De La Torre RN  Infection Prevention:   cohorting utilized   environmental surveillance performed   equipment surfaces disinfected   hand hygiene promoted   personal protective equipment utilized   rest/sleep promoted   single patient room provided    visitors restricted/screened  Taken 3/15/2021 1030 by Arjun De La Torre RN  Infection Prevention:   cohorting utilized   environmental surveillance performed   equipment surfaces disinfected   hand hygiene promoted   rest/sleep promoted   personal protective equipment utilized   single patient room provided   visitors restricted/screened  Taken 3/15/2021 0807 by Arjun De La Torre RN  Infection Prevention:   cohorting utilized   environmental surveillance performed   equipment surfaces disinfected   hand hygiene promoted   personal protective equipment utilized   rest/sleep promoted   single patient room provided   visitors restricted/screened  Goal: Optimal Comfort and Wellbeing  Outcome: Ongoing, Progressing  Goal: Readiness for Transition of Care  Outcome: Ongoing, Progressing     Problem: Skin Injury Risk Increased  Goal: Skin Health and Integrity  Outcome: Ongoing, Progressing  Intervention: Optimize Skin Protection  Recent Flowsheet Documentation  Taken 3/15/2021 1812 by Arjun De La Torre RN  Head of Bed (HOB): HOB at 30-45 degrees  Taken 3/15/2021 1658 by Arjun De La Torre RN  Head of Bed (HOB): HOB at 30-45 degrees  Taken 3/15/2021 1410 by Arjun De La Torre RN  Head of Bed (HOB): HOB at 60 degrees  Taken 3/15/2021 1238 by Arjun De La Torre RN  Head of Bed (HOB): HOB at 20-30 degrees  Taken 3/15/2021 1030 by Arjun De aL Torre RN  Head of Bed (HOB): HOB at 20-30 degrees  Taken 3/15/2021 0807 by Arjun De La Torre RN  Head of Bed (HOB): HOB elevated  Pressure Reduction Devices: alternating pressure pump (ADD)     Problem: Fall Injury Risk  Goal: Absence of Fall and Fall-Related Injury  Outcome: Ongoing, Progressing  Intervention: Identify and Manage Contributors to Fall Injury Risk  Recent Flowsheet Documentation  Taken 3/15/2021 1812 by Arjun De La Torre RN  Medication Review/Management: medications reviewed  Taken 3/15/2021 1658 by Arjun De La Torre RN  Medication Review/Management: medications reviewed  Taken 3/15/2021 1410 by  Arjun De La Torre RN  Medication Review/Management: medications reviewed  Taken 3/15/2021 1238 by Arjun De La Torre RN  Medication Review/Management: medications reviewed  Taken 3/15/2021 1030 by Arjun De La Torre RN  Medication Review/Management: medications reviewed  Taken 3/15/2021 0807 by Arjun De La Torre RN  Medication Review/Management: medications reviewed  Intervention: Promote Injury-Free Environment  Recent Flowsheet Documentation  Taken 3/15/2021 1812 by Arjun De La Torre RN  Safety Promotion/Fall Prevention:   activity supervised   clutter free environment maintained   assistive device/personal items within reach   elopement precautions   fall prevention program maintained   gait belt   mobility aid in reach   lighting adjusted   muscle strengthening facilitated   nonskid shoes/slippers when out of bed   room organization consistent   safety round/check completed   toileting scheduled  Taken 3/15/2021 1658 by Arjun De La Torre RN  Safety Promotion/Fall Prevention:   activity supervised   assistive device/personal items within reach   clutter free environment maintained   elopement precautions   gait belt   lighting adjusted   mobility aid in reach   fall prevention program maintained   nonskid shoes/slippers when out of bed   muscle strengthening facilitated   room organization consistent   safety round/check completed   toileting scheduled  Taken 3/15/2021 1410 by Arujn De La Torre RN  Safety Promotion/Fall Prevention:   activity supervised   assistive device/personal items within reach   clutter free environment maintained   elopement precautions   fall prevention program maintained   gait belt   lighting adjusted   mobility aid in reach   muscle strengthening facilitated   nonskid shoes/slippers when out of bed   room organization consistent   safety round/check completed   toileting scheduled  Taken 3/15/2021 1238 by Arjun De La Torre RN  Safety Promotion/Fall Prevention:   activity supervised   assistive  device/personal items within reach   clutter free environment maintained   elopement precautions   gait belt   fall prevention program maintained   lighting adjusted   mobility aid in reach   muscle strengthening facilitated   nonskid shoes/slippers when out of bed   room organization consistent   safety round/check completed   toileting scheduled  Taken 3/15/2021 1030 by Arjun De La Torre RN  Safety Promotion/Fall Prevention:   activity supervised   assistive device/personal items within reach   clutter free environment maintained   elopement precautions   fall prevention program maintained   lighting adjusted   mobility aid in reach   gait belt   muscle strengthening facilitated   nonskid shoes/slippers when out of bed   room organization consistent   safety round/check completed   toileting scheduled  Taken 3/15/2021 0807 by Arjun De La Torre RN  Safety Promotion/Fall Prevention:   activity supervised   assistive device/personal items within reach   clutter free environment maintained   elopement precautions   fall prevention program maintained   gait belt   mobility aid in reach   muscle strengthening facilitated   nonskid shoes/slippers when out of bed   lighting adjusted   room organization consistent   safety round/check completed   toileting scheduled     Problem: Adjustment to Illness (Stroke, Ischemic/Transient Ischemic Attack)  Goal: Optimal Coping  Outcome: Ongoing, Progressing     Problem: Bowel Elimination Impaired (Stroke, Ischemic/Transient Ischemic Attack)  Goal: Effective Bowel Elimination  Outcome: Ongoing, Progressing     Problem: Cerebral Tissue Perfusion Risk (Stroke, Ischemic/Transient Ischemic Attack)  Goal: Optimal Cerebral Tissue Perfusion  Outcome: Ongoing, Progressing  Intervention: Optimize Oxygenation and Ventilation  Recent Flowsheet Documentation  Taken 3/15/2021 1812 by Arjun De La Torre RN  Head of Bed (HOB): HOB at 30-45 degrees  Taken 3/15/2021 1658 by Arjun De La Torre RN  Head of Bed  (HOB): HOB at 30-45 degrees  Taken 3/15/2021 1410 by Arjun De La Torre RN  Head of Bed (HOB): HOB at 60 degrees  Taken 3/15/2021 1238 by Arjun De La Torre RN  Head of Bed (HOB): HOB at 20-30 degrees  Taken 3/15/2021 1030 by Arjun De La Torre RN  Head of Bed (HOB): HOB at 20-30 degrees  Taken 3/15/2021 0807 by Arjun De La Torre RN  Head of Bed (HOB): HOB elevated     Problem: Communication Impairment (Stroke, Ischemic/Transient Ischemic Attack)  Goal: Improved Communication Skills  Outcome: Ongoing, Progressing     Problem: Eating/Swallowing Impairment (Stroke, Ischemic/Transient Ischemic Attack)  Goal: Oral Intake without Aspiration  Outcome: Ongoing, Progressing     Problem: Functional Ability Impaired (Stroke, Ischemic/Transient Ischemic Attack)  Goal: Optimal Functional Ability  Outcome: Ongoing, Progressing  Intervention: Optimize Functional Ability  Recent Flowsheet Documentation  Taken 3/15/2021 1812 by Arjun De La Torre RN  Activity Management: activity adjusted per tolerance  Taken 3/15/2021 1658 by Arjun De La Torre RN  Activity Management: activity adjusted per tolerance  Taken 3/15/2021 1410 by Arjun De La Torre RN  Activity Management: activity adjusted per tolerance  Taken 3/15/2021 1238 by Arjun De La Torre RN  Activity Management: activity adjusted per tolerance  Taken 3/15/2021 1030 by Arjun De La Torre RN  Activity Management: activity adjusted per tolerance  Taken 3/15/2021 0807 by Arjun De La Torre RN  Activity Management: activity adjusted per tolerance     Problem: Hemodynamic Instability (Stroke, Ischemic/Transient Ischemic Attack)  Goal: Vital Signs Remain in Desired Range  Outcome: Ongoing, Progressing     Problem: Pain (Stroke, Ischemic/Transient Ischemic Attack)  Goal: Acceptable Pain Control  Outcome: Ongoing, Progressing     Problem: Sensorimotor Impairment (Stroke, Ischemic/Transient Ischemic Attack)  Goal: Improved Sensorimotor Function  Outcome: Ongoing, Progressing  Intervention: Optimize Range of  Motion, Motor Control and Function  Recent Flowsheet Documentation  Taken 3/15/2021 1812 by Arjun De La Torre RN  Positioning/Transfer Devices:   pillows   in use  Taken 3/15/2021 1658 by Arjun De La Torre RN  Positioning/Transfer Devices:   pillows   in use  Taken 3/15/2021 1410 by Arjun De La Torre RN  Positioning/Transfer Devices:   pillows   in use  Taken 3/15/2021 1238 by Arjun De La Torre RN  Positioning/Transfer Devices:   pillows   in use  Taken 3/15/2021 1030 by Arjun De La Torre RN  Positioning/Transfer Devices:   pillows   in use  Taken 3/15/2021 0807 by Arjun De La Torre RN  Positioning/Transfer Devices:   pillows   in use  Intervention: Optimize Sensory and Perceptual Abilities  Recent Flowsheet Documentation  Taken 3/15/2021 0807 by Arjun De La Torre RN  Pressure Reduction Devices: alternating pressure pump (ADD)     Problem: Urinary Elimination Impaired (Stroke, Ischemic/Transient Ischemic Attack)  Goal: Effective Urinary Elimination  Outcome: Ongoing, Progressing     Problem: Restraint, Nonbehavioral (Nonviolent)  Goal: Discontinuation Criteria Achieved  Outcome: Ongoing, Progressing  Intervention: Implement Least-restrictive Safety Strategies  Recent Flowsheet Documentation  Taken 3/15/2021 1812 by Arjun De La Torre RN  Medical Device Protection: IV pole/bag removed from visual field  Taken 3/15/2021 1658 by Arjun De La Torre RN  Medical Device Protection: IV pole/bag removed from visual field  Taken 3/15/2021 1410 by Arjun De La Torre RN  Medical Device Protection: IV pole/bag removed from visual field  Taken 3/15/2021 1238 by Arjun De La Torre RN  Medical Device Protection: IV pole/bag removed from visual field  Taken 3/15/2021 1030 by Arjun De La Torre RN  Medical Device Protection: IV pole/bag removed from visual field  Taken 3/15/2021 0807 by Arjun De La Torre RN  Medical Device Protection: IV pole/bag removed from visual field  Goal: Personal Dignity and Safety Maintained  Outcome: Ongoing, Progressing  Intervention:  Protect Skin and Joint Integrity  Recent Flowsheet Documentation  Taken 3/15/2021 1812 by Arjun De La Torre RN  Body Position: position changed independently  Taken 3/15/2021 1658 by Arjun De La Torre RN  Body Position: position changed independently  Taken 3/15/2021 1410 by Arjun De La Torre RN  Body Position: position changed independently  Taken 3/15/2021 1238 by Arjun De La Torre RN  Body Position: position changed independently  Taken 3/15/2021 1030 by Arjun De La Torre RN  Body Position: position changed independently  Taken 3/15/2021 0807 by Arjun De La Torre RN  Body Position: position changed independently     Problem: Communication Impairment (Mechanical Ventilation, Invasive)  Goal: Effective Communication  Outcome: Ongoing, Progressing     Problem: Device-Related Complication Risk (Mechanical Ventilation, Invasive)  Goal: Optimal Device Function  Outcome: Ongoing, Progressing  Intervention: Optimize Device Care and Function  Recent Flowsheet Documentation  Taken 3/15/2021 1812 by Arjun De La Torre RN  Airway Safety Measures: mask at bedside  Taken 3/15/2021 1658 by Arjun De La Torre RN  Airway Safety Measures: mask at bedside  Taken 3/15/2021 1410 by Arjun De La Torre RN  Airway Safety Measures: mask at bedside  Taken 3/15/2021 1238 by Arjun De La Torre RN  Airway Safety Measures: mask at bedside  Taken 3/15/2021 1030 by Arjun De La Torre RN  Airway Safety Measures: mask at bedside  Taken 3/15/2021 0807 by Arjun De La Torre RN  Airway Safety Measures:   mask at bedside   suction at bedside     Problem: Nutrition Impairment (Mechanical Ventilation, Invasive)  Goal: Optimal Nutrition Delivery  Outcome: Ongoing, Progressing     Problem: Skin and Tissue Injury (Mechanical Ventilation, Invasive)  Goal: Absence of Device-Related Skin and Tissue Injury  Outcome: Ongoing, Progressing     Problem: Ventilator-Induced Lung Injury (Mechanical Ventilation, Invasive)  Goal: Absence of Ventilator-Induced Lung Injury  Outcome: Ongoing,  Progressing  Intervention: Prevent Ventilator-Associated Pneumonia  Recent Flowsheet Documentation  Taken 3/15/2021 1812 by Arjun De La Torre RN  Head of Bed (HOB): HOB at 30-45 degrees  Taken 3/15/2021 1658 by Arjun De La Torre RN  Head of Bed (HOB): HOB at 30-45 degrees  Taken 3/15/2021 1410 by Arjun De La Torre RN  Head of Bed (HOB): HOB at 60 degrees  Taken 3/15/2021 1238 by Arjun De La Torre RN  Head of Bed (HOB): HOB at 20-30 degrees  Taken 3/15/2021 1030 by Arjun De La Torre RN  Head of Bed (HOB): HOB at 20-30 degrees  Taken 3/15/2021 0807 by Arjun De La Torre RN  Head of Bed (HOB): HOB elevated     Problem: Fluid Imbalance (Pneumonia)  Goal: Fluid Balance  Outcome: Ongoing, Progressing     Problem: Infection (Pneumonia)  Goal: Resolution of Infection Signs and Symptoms  Outcome: Ongoing, Progressing     Problem: Respiratory Compromise (Pneumonia)  Goal: Effective Oxygenation and Ventilation  Outcome: Ongoing, Progressing  Intervention: Optimize Oxygenation and Ventilation  Recent Flowsheet Documentation  Taken 3/15/2021 1812 by Arjun De La Torre RN  Head of Bed (HOB): HOB at 30-45 degrees  Taken 3/15/2021 1658 by Arjun De La Torre RN  Head of Bed (HOB): HOB at 30-45 degrees  Taken 3/15/2021 1410 by Arjun De La Torre RN  Head of Bed (HOB): HOB at 60 degrees  Taken 3/15/2021 1238 by Arjun De La Torre RN  Head of Bed (HOB): HOB at 20-30 degrees  Taken 3/15/2021 1030 by Arjun De La Torre RN  Head of Bed (HOB): HOB at 20-30 degrees  Taken 3/15/2021 0807 by Arjun De La Torre RN  Head of Bed (HOB): HOB elevated   Goal Outcome Evaluation:

## 2021-03-15 NOTE — PROGRESS NOTES
[facility] INPATIENT PROGRESS NOTE         [address]    3/15/2021      PATIENT IDENTIFICATION:  Name: Jose R Dalton ADMIT: 3/7/2021   : 1960  PCP: Provider, No Known    MRN: [id number] LOS: 8 days   AGE/SEX: 60 y.o. male  ROOM: [facility]                     LOS 8    Reason for visit:       SUBJECTIVE: Aspiration pneumonia with acute stroke     Still very confused.  Requiring restraints and McCune vest.  Verbal but not following commands and not answering questions appropriately this morning.  Says that he wants breakfast.  Talking unintelligibly in between coherent sentences.  I am seeing the patient for the first time today.  All patient problems are new to me.      Objective   OBJECTIVE:    Vital Sign Min/Max for last 24 hours  Temp  Min: 97.1 °F (36.2 °C)  Max: 99.1 °F (37.3 °C)   BP  Min: 109/75  Max: 175/111   Pulse  Min: 85  Max: 91   Resp  Min: 14  Max: 24   SpO2  Min: 94 %  Max: 100 %   No data recorded   No data recorded                   FiO2 (%): (!) 20 %     Body mass index is 32.9 kg/m².    Intake/Output Summary (Last 24 hours) at 3/15/2021 0950  Last data filed at 3/14/2021 1300  Gross per 24 hour   Intake --   Output 950 ml   Net -950 ml         Exam:  GEN:  No distress, appears stated age.  Confused  EYES:   PERRL, anicteric sclera  ENT:    External ears/nose normal, OP clear  NECK:  No adenopathy, midline trachea  LUNGS: Normal chest on inspection, palpation and auscultation  CV:  Normal S1S2, without murmur  ABD:  Non tender, non distended, no hepatosplenomegaly, +BS  EXT:  No edema, cyanosis or clubbing    Assessment     Scheduled meds:  aspirin, 81 mg, Nasogastric, Daily  atorvastatin, 80 mg, Nasogastric, Nightly  chlorhexidine, 15 mL, Mouth/Throat, Q12H  cloNIDine, 0.3 mg, Oral, Q8H  hydrALAZINE, 50 mg, Nasogastric, Q8H  nicotine, 1 patch, Transdermal, Q24H  QUEtiapine, 50 mg, Nasogastric, Q12H  ticagrelor, 90 mg, Oral, Daily      IV meds:                         Data  Review:  Results from last 7 days   Lab Units 03/15/21  0841 03/14/21  0756 03/13/21  0900 03/12/21  0813 03/11/21  0740   SODIUM mmol/L 153* 150* 146* 145 143   POTASSIUM mmol/L 3.2* 4.0 3.9 3.7 3.8   CHLORIDE mmol/L 115* 116* 111* 111* 109*   CO2 mmol/L 24.0 22.8 21.1* 23.5 22.8   BUN mg/dL 40* 38* 29* 25* 24*   CREATININE mg/dL 1.38* 0.98 1.13 0.98 1.09   GLUCOSE mg/dL 108* 146* 160* 146* 135*   CALCIUM mg/dL 9.3 8.9 8.8 8.5* 8.6         Estimated Creatinine Clearance: 75.9 mL/min (A) (by C-G formula based on SCr of 1.38 mg/dL (H)).  Results from last 7 days   Lab Units 03/14/21  0756 03/12/21  0813 03/11/21  0740 03/10/21  0427 03/09/21  0321   WBC 10*3/mm3 16.99* 10.31 11.32* 12.47* 16.21*   HEMOGLOBIN g/dL 10.4* 11.1* 12.3* 11.7* 12.1*   PLATELETS 10*3/mm3 195 167 156 199 220     Results from last 7 days   Lab Units 03/08/21  1001   INR  1.35*     Results from last 7 days   Lab Units 03/10/21  0427 03/09/21  0321   ALT (SGPT) U/L 22 8   AST (SGOT) U/L 94* 32     Results from last 7 days   Lab Units 03/12/21  1201 03/12/21  0418 03/08/21  1121   PH, ARTERIAL pH units 7.525* 7.459* 7.399   PO2 ART mm Hg 91.6 77.7* 67.9*   PCO2, ARTERIAL mm Hg 30.7* 34.1* 32.3*   HCO3 ART mmol/L 25.3 24.2 19.9*                 )        Active Hospital Problems    Diagnosis  POA   • Cerebrovascular accident (CVA) (CMS/HCC) [I63.9]  Yes      Resolved Hospital Problems   No resolved problems to display.         ASSESSMENT:    1. Aspiration pneumonia  2. Acute stroke likely secondary to left carotid occlusion status post TPA and stenting  3. History of right MCA stroke previously  4. HTN  5. HLD  6. Seizure history  7. Smoker  8. H/o Polysubstance abuse  9. MARIO  10. Metabolic acidosis improved  11. Leukocytosis    PLAN:    Still with significant confusion.  Off Precedex but requiring benzodiazepines and restraints.  Getting Seroquel.  If does not show signs of improvement will consider psychiatry consultation.  On anticoagulation  with aspirin and Brilinta per neurology recommendations and high-dose statin.  Goal systolic blood pressure less than 140.  PT/ST/OT.    Tariq Boone MD  Pulmonary and Critical Care Medicine  [facility], [facility]  3/15/2021    09:50 EDT

## 2021-03-15 NOTE — PLAN OF CARE
Goal Outcome Evaluation:  Plan of Care Reviewed With: (P) patient  Progress: (P) no change  Outcome Summary: (P) pt ambulated 400ft with minAx2/modAx2 with HHA. pt had several LOB that req'd Crescencio to correct. pt was very impulsive and distrated when ambulating. pt req'd Crescencio for bed mobility and CGA for STS transfers. unable to do any further exercises due to difficulty following commands. continue to progress pt as able.  Pt wore mask. Therapist wore proper PPE and standard mask. Pt not an enhanced droplet precaution.

## 2021-03-15 NOTE — PROGRESS NOTES
Adult Nutrition  Assessment/PES    Patient Name:  Jose R Dalton  YOB: 1960  MRN: [id number]  Admit Date:  3/7/2021    Assessment Date:  3/15/2021    Comments:  Follow up note. Pt extubated and diet advanced per speech eval. Per speech VFSS this am was canceled due to pt's confusion and being in restraints. Pt needed cues to participate and stay alert. Will continue to monitor po intake and add supplements to help meet needs.    Reason for Assessment     Row Name 03/15/21 1417          Reason for Assessment    Reason For Assessment  follow-up protocol     Diagnosis  -- extubarted         Nutrition/Diet History     Row Name 03/15/21 1417          Nutrition/Diet History    Typical Food/Fluid Intake  VFSS this am was canceled due to pt's confusion and being in restraints. Pt needed cues to participate and stay alert           Labs/Tests/Procedures/Meds     Row Name 03/15/21 1417          Labs/Procedures/Meds    Lab Results Reviewed  reviewed        Diagnostic Tests/Procedures    Diagnostic Test/Procedure Reviewed  reviewed        Medications    Pertinent Medications Reviewed  reviewed         Physical Findings     Row Name 03/15/21 1417          Physical Findings    Overall Physical Appearance  obese     Skin  surgical incision           Nutrition Prescription Ordered     Row Name 03/15/21 1418          Nutrition Prescription PO    Current PO Diet  Dysphagia;Pureed     Fluid Consistency  Nectar/syrup thick                 Problem/Interventions:  Problem 1     Row Name 03/15/21 1418          Nutrition Diagnoses Problem 1    Resolved?  Yes         Problem 2     Row Name 03/15/21 1418          Nutrition Diagnoses Problem 2    Problem 2  Swallowing Difficulty     Etiology (related to)  Medical Diagnosis     Signs/Symptoms (evidenced by)  SLP/Swallow eval     Swallow eval status  Done             Intervention Goal     Row Name 03/15/21 1418          Intervention Goal    General  Maintain nutrition;Disease  management/therapy;Reduce/improve symptoms;Meet nutritional needs for age/condition     PO  Initiate feeding;Establish PO;Tolerate PO;PO intake (%)     PO Intake %  75 %     Weight  No significant weight loss         Nutrition Intervention     Row Name 03/15/21 1419          Nutrition Intervention    RD/Tech Action  Follow Tx progress;Care plan reviewd         Nutrition Prescription     Row Name 03/15/21 1419          Nutrition Prescription PO    PO Prescription  Begin/change supplement     Supplement  Magic Cup         Education/Evaluation     Row Name 03/15/21 1419          Monitor/Evaluation    Monitor  Per protocol;I&O           Electronically signed by:  Christine Sales RD  03/15/21 14:19 EDT

## 2021-03-15 NOTE — PLAN OF CARE
Goal Outcome Evaluation:  Plan of Care Reviewed With: patient, family     Outcome Summary: Swallow reevaluated. VFSS this am was canceled due to pt's confusion and being in restraints. Pt needed cues to participate and stay alert during eval. Pt showed s/s with thins. Swallow is delayed.  No s/s with pureed or NTL. Solids were not trialed due to pt's fatigue. Family to bring in dentures. Recommend pureed w/ NTL ; meds crushed in pureed; upright for meals and 30 min after; slow rate; small bites/sips; supervision wtih meals and assist with feeding. ST will follow for diet tolerance and reeval as indicated.

## 2021-03-15 NOTE — SIGNIFICANT NOTE
Pt became very agitated with staff, took off heart monitor, O2 sensor, and would not allow vs to be taken.  He attempted to call his  Brother without success to come and get him- then, kept waiting in borjas for him to come.  Would not take direction to come back to room.  Doctor notified of restlessness and agitation--orders received for ativan.  Pt refused nurse to give him anything. Daughter came in to calm pt and sit with him a while.he allowed me to give his meds--then, once pt asleep, she left because she was starting a new job in the morning.  Pt awoke again, high falls risk- would not stay in bed, kept trying to leave.  Wants to go down to smoke.  Received order for nicotine patch.

## 2021-03-15 NOTE — PROGRESS NOTES
Continued Stay Note  [facility]     Patient Name: Jose R Dalton  MRN: [id number]  Today's Date: 3/15/2021    Admit Date: 3/7/2021    Discharge Plan     Row Name 03/15/21 1429       Plan    Plan Comments  Inbound call from patient's sister stating the [facility]. Explained patients daughter is next of kin and CCP will have to verify with her that is what she is wanting. [facility]ed to [facility] and there was no answer and VM box was full. Spoke with [facility] and [facility] or neuro[facility]. Medicare A is primary. Inbound call from daughter Harsh and explained all this to her and she is still requesting patient be transferred bc they feel he will be more comfortable [facility] the [facility]. Explained again the [facility] no [facility]/neurosurgical services and it will be up to the patient's primary physician here if they feel he is still able for a transfer. Daughter verbalized understanding. Message sent to pulmonologist. Awaiting response. Crystal Kaye RN        Discharge Codes    No documentation.             Crystal Kaye RN

## 2021-03-15 NOTE — PROGRESS NOTES
[facility] Acute Inpt Rehab Note     Chart reviewed.  Note patient still with significant agitation.  Have asked CCP to get occupational therapy to see patient.      Per charting, daughter main family contact and is reportedly starting a new job today.  She apparently had to come in last evening for a while to help calm patient.  Will attempt to reach her later this afternoon to find out social support and home setting.    We will continue to follow.    Thanks,   Cecelia Freed RN  Rehab Admission Nurse   [phone]

## 2021-03-16 LAB
ALBUMIN SERPL-MCNC: 3.9 G/DL (ref 3.5–5.2)
ANION GAP SERPL CALCULATED.3IONS-SCNC: 11.1 MMOL/L (ref 5–15)
BUN SERPL-MCNC: 40 MG/DL (ref 6–20)
BUN/CREAT SERPL: 29.6 (ref 7–25)
CALCIUM SPEC-SCNC: 8.9 MG/DL (ref 8.6–10.5)
CHLORIDE SERPL-SCNC: 119 MMOL/L (ref 98–107)
CO2 SERPL-SCNC: 25.9 MMOL/L (ref 22–29)
CREAT SERPL-MCNC: 1.35 MG/DL (ref 0.76–1.27)
GFR SERPL CREATININE-BSD FRML MDRD: 66 ML/MIN/1.73
GLUCOSE SERPL-MCNC: 110 MG/DL (ref 65–99)
PHOSPHATE SERPL-MCNC: 4.3 MG/DL (ref 2.5–4.5)
POTASSIUM SERPL-SCNC: 3.7 MMOL/L (ref 3.5–5.2)
SODIUM SERPL-SCNC: 156 MMOL/L (ref 136–145)

## 2021-03-16 PROCEDURE — 97535 SELF CARE MNGMENT TRAINING: CPT

## 2021-03-16 PROCEDURE — 92523 SPEECH SOUND LANG COMPREHEN: CPT

## 2021-03-16 PROCEDURE — 97166 OT EVAL MOD COMPLEX 45 MIN: CPT

## 2021-03-16 PROCEDURE — 92526 ORAL FUNCTION THERAPY: CPT

## 2021-03-16 PROCEDURE — 97116 GAIT TRAINING THERAPY: CPT

## 2021-03-16 PROCEDURE — 80069 RENAL FUNCTION PANEL: CPT | Performed by: INTERNAL MEDICINE

## 2021-03-16 RX ORDER — AMLODIPINE BESYLATE 10 MG/1
10 TABLET ORAL
Status: DISCONTINUED | OUTPATIENT
Start: 2021-03-16 | End: 2021-03-19 | Stop reason: HOSPADM

## 2021-03-16 RX ORDER — ASPIRIN 81 MG/1
81 TABLET, CHEWABLE ORAL DAILY
Status: DISCONTINUED | OUTPATIENT
Start: 2021-03-17 | End: 2021-03-19 | Stop reason: HOSPADM

## 2021-03-16 RX ORDER — HYDRALAZINE HYDROCHLORIDE 25 MG/1
25 TABLET, FILM COATED ORAL EVERY 8 HOURS SCHEDULED
Status: DISCONTINUED | OUTPATIENT
Start: 2021-03-16 | End: 2021-03-16

## 2021-03-16 RX ORDER — HYDRALAZINE HYDROCHLORIDE 25 MG/1
25 TABLET, FILM COATED ORAL EVERY 8 HOURS SCHEDULED
Status: DISCONTINUED | OUTPATIENT
Start: 2021-03-17 | End: 2021-03-19 | Stop reason: HOSPADM

## 2021-03-16 RX ORDER — QUETIAPINE FUMARATE 50 MG/1
50 TABLET, FILM COATED ORAL EVERY 12 HOURS SCHEDULED
Status: DISCONTINUED | OUTPATIENT
Start: 2021-03-17 | End: 2021-03-19 | Stop reason: HOSPADM

## 2021-03-16 RX ORDER — ATORVASTATIN CALCIUM 80 MG/1
80 TABLET, FILM COATED ORAL NIGHTLY
Status: DISCONTINUED | OUTPATIENT
Start: 2021-03-17 | End: 2021-03-19 | Stop reason: HOSPADM

## 2021-03-16 RX ADMIN — HYDRALAZINE HYDROCHLORIDE 25 MG: 50 TABLET, FILM COATED ORAL at 22:33

## 2021-03-16 RX ADMIN — TICAGRELOR 90 MG: 90 TABLET ORAL at 10:17

## 2021-03-16 RX ADMIN — ASPIRIN 81 MG: 81 TABLET, CHEWABLE ORAL at 10:17

## 2021-03-16 RX ADMIN — ATORVASTATIN CALCIUM 80 MG: 80 TABLET, FILM COATED ORAL at 22:33

## 2021-03-16 RX ADMIN — AMLODIPINE BESYLATE 10 MG: 10 TABLET ORAL at 10:17

## 2021-03-16 RX ADMIN — HYDRALAZINE HYDROCHLORIDE 50 MG: 50 TABLET, FILM COATED ORAL at 05:58

## 2021-03-16 RX ADMIN — QUETIAPINE FUMARATE 50 MG: 50 TABLET, FILM COATED ORAL at 22:32

## 2021-03-16 RX ADMIN — CLONIDINE HYDROCHLORIDE 0.3 MG: 0.1 TABLET ORAL at 22:33

## 2021-03-16 RX ADMIN — QUETIAPINE FUMARATE 50 MG: 50 TABLET, FILM COATED ORAL at 10:17

## 2021-03-16 RX ADMIN — Medication 1 PATCH: at 10:17

## 2021-03-16 RX ADMIN — CLONIDINE HYDROCHLORIDE 0.3 MG: 0.1 TABLET ORAL at 14:18

## 2021-03-16 RX ADMIN — HYDRALAZINE HYDROCHLORIDE 25 MG: 50 TABLET, FILM COATED ORAL at 14:18

## 2021-03-16 RX ADMIN — CLONIDINE HYDROCHLORIDE 0.3 MG: 0.1 TABLET ORAL at 05:58

## 2021-03-16 RX ADMIN — SODIUM CHLORIDE, PRESERVATIVE FREE 10 ML: 5 INJECTION INTRAVENOUS at 22:34

## 2021-03-16 RX ADMIN — CHLORHEXIDINE GLUCONATE 15 ML: 1.2 RINSE ORAL at 22:32

## 2021-03-16 NOTE — PROGRESS NOTES
Continued Stay Note  [facility]     Patient Name: Jose R Dalton  MRN: [id number]  Today's Date: 3/16/2021    Admit Date: 3/7/2021    Discharge Plan     Row Name 03/16/21 1030       Plan    Plan Comments  Inbound call from patient's sister requesting update [facility]. Explained [facility] stated this morning since there are no neurological services inpatient at the [facility] he does not feel a transfer is appropriate. Sister verbalized understanding. Explained patient needs rehab at [facility] and [facility] acute rehab is evaluating the patient and will reach out to [place] to discuss DC plans. She verbalized understanding. CCP awaiting BAR eval. Crystal Kaye RN        Discharge Codes    No documentation.             Crystal Kaye RN

## 2021-03-16 NOTE — PROGRESS NOTES
[facility] INPATIENT PROGRESS NOTE         [address]    3/16/2021      PATIENT IDENTIFICATION:  Name: Jose R Dalton ADMIT: 3/7/2021   : 1960  PCP: Provider, No Known    MRN: [id number] LOS: 9 days   AGE/SEX: 60 y.o. male  ROOM: [facility]                     LOS 9    Reason for visit:       SUBJECTIVE: Aspiration pneumonia with acute stroke     Resting comfortably. More lucid today than yesterday. Still requiring restraints but discussed with nursing staff and they're going to try him out of restraints this morning when family arrives. He did very well with family at bedside yesterday.      Objective   OBJECTIVE:    Vital Sign Min/Max for last 24 hours  Temp  Min: 97.1 °F (36.2 °C)  Max: 98.6 °F (37 °C)   BP  Min: 130/83  Max: 171/79   Pulse  Min: 68  Max: 100   Resp  Min: 16  Max: 18   SpO2  Min: 95 %  Max: 100 %   No data recorded   No data recorded                   FiO2 (%): (!) 20 %     Body mass index is 32.9 kg/m².    Intake/Output Summary (Last 24 hours) at 3/16/2021 0818  Last data filed at 3/16/2021 0605  Gross per 24 hour   Intake 250 ml   Output --   Net 250 ml         Exam:  GEN:  No distress, appears stated age.  Less confused  EYES:   PERRL, anicteric sclera  ENT:    External ears/nose normal, OP clear  NECK:  No adenopathy, midline trachea  LUNGS: Normal chest on inspection, palpation and auscultation  CV:  Normal S1S2, without murmur  ABD:  Non tender, non distended, no hepatosplenomegaly, +BS  EXT:  No edema, cyanosis or clubbing    Assessment     Scheduled meds:  aspirin, 81 mg, Nasogastric, Daily  atorvastatin, 80 mg, Nasogastric, Nightly  chlorhexidine, 15 mL, Mouth/Throat, Q12H  cloNIDine, 0.3 mg, Oral, Q8H  hydrALAZINE, 50 mg, Nasogastric, Q8H  nicotine, 1 patch, Transdermal, Q24H  QUEtiapine, 50 mg, Nasogastric, Q12H  ticagrelor, 90 mg, Oral, Daily      IV meds:                         Data Review:  Results from last 7 days   Lab Units 21  0534 03/15/21  0841  03/14/21  0756 03/13/21  0900 03/12/21  0813   SODIUM mmol/L 156* 153* 150* 146* 145   POTASSIUM mmol/L 3.7 3.2* 4.0 3.9 3.7   CHLORIDE mmol/L 119* 115* 116* 111* 111*   CO2 mmol/L 25.9 24.0 22.8 21.1* 23.5   BUN mg/dL 40* 40* 38* 29* 25*   CREATININE mg/dL 1.35* 1.38* 0.98 1.13 0.98   GLUCOSE mg/dL 110* 108* 146* 160* 146*   CALCIUM mg/dL 8.9 9.3 8.9 8.8 8.5*         Estimated Creatinine Clearance: 77.6 mL/min (A) (by C-G formula based on SCr of 1.35 mg/dL (H)).  Results from last 7 days   Lab Units 03/14/21  0756 03/12/21  0813 03/11/21  0740 03/10/21  0427   WBC 10*3/mm3 16.99* 10.31 11.32* 12.47*   HEMOGLOBIN g/dL 10.4* 11.1* 12.3* 11.7*   PLATELETS 10*3/mm3 195 167 156 199         Results from last 7 days   Lab Units 03/10/21  0427   ALT (SGPT) U/L 22   AST (SGOT) U/L 94*     Results from last 7 days   Lab Units 03/12/21  1201 03/12/21  0418   PH, ARTERIAL pH units 7.525* 7.459*   PO2 ART mm Hg 91.6 77.7*   PCO2, ARTERIAL mm Hg 30.7* 34.1*   HCO3 ART mmol/L 25.3 24.2             Chest x-ray 3/12/2021 reviewed: No acute          Microbiology reviewed  )        Active Hospital Problems    Diagnosis  POA   • Cerebrovascular accident (CVA) (CMS/HCC) [I63.9]  Yes      Resolved Hospital Problems   No resolved problems to display.         ASSESSMENT:    1. Aspiration pneumonia  2. Acute stroke likely secondary to left carotid occlusion status post TPA and stenting  3. History of right MCA stroke previously  4. HTN  5. HLD  6. Seizure history  7. Smoker  8. H/o Polysubstance abuse  9. MARIO  10. Metabolic acidosis improved  11. Leukocytosis  12. Hypernatremia    PLAN:  Less confused this morning.  We will try him out of restraints and family is able to get here.  They were very helpful with redirecting yesterday.  Scheduled Seroquel.    Continue current anticoagulation per neurology recommendations on high-dose statin for secondary stroke prevention.  Control blood pressure.  Goal less than 140 systolic.  Adding  Norvasc.  Without [facility] available at the [facility] I do not think that it is appropriate to consider [facility] to the [facility] at this time as family was interested in considering.  Control glucose.  Dysphagia diet.  PT/ST/OT        Tariq Boone MD  Pulmonary and Critical Care Medicine  [facility], [facility]  3/16/2021    08:18 EDT

## 2021-03-16 NOTE — PROGRESS NOTES
[facility] Acute Inpt Rehab Note     Spoke with daughterHarsh, by phone.  Discussed acute inpt rehab, social support, and program details.  Daughter is on her way to the hospital and wants to talk with her Dad before making a decision about pursuing rehab.  Harsh states she will call Rehab Admissions back later with a decision.     Will continue to follow.    Thanks,   Cecelia Freed RN  Rehab Admission Nurse   [phone]

## 2021-03-16 NOTE — PLAN OF CARE
Goal Outcome Evaluation:  Plan of Care Reviewed With: patient     Outcome Summary: Pt is a 60 y.o. male admitted to [facility] with an acute CVA with left carotic occlusion, s/p successful revacularization with aspiration, thrombectomy, and angioplasty and stent placement on 3/7. Pt reports living alone with family that is able to assist when needed. Pt reports occasionally needing some A with dressing tasks due to difficulty with maniplating small clothing items. Today, pt presented with cognitive deficits, impaired balance, and decreased overall safety and indep with ADLs and funcitonal tasks. Pt supervision for bed mobility and LB dressing (donning/doffing B socks). Pt with need for CGA with VCs for functional transfers and functional mobility tasks utilizing a RWx from bed <> restroom. Pt with 1-2 LOBs to the R during turns. Pt able to perform grooming tasks at sinkside supported/unsupported with intermittent assist required for sequencing. Pt would continue to benefit from skilled OT services to address functional deficits and maximize level of safety/indep with ADLs for safe return to [facility]. At time of evaluation, posey vest and B soft wrist restraints were removed for full pt participation, and then were reapplied after OT evaluation.      Patient was placed in a face mask during this therapy encounter. Therapist used appropriate personal protective equipment including surgical mask, eye shield and gloves during the entire therapy session. Hand hygiene was completed before and after therapy session. Patient is not in enhanced droplet precautions.

## 2021-03-16 NOTE — THERAPY TREATMENT NOTE
Patient Name: Jose R Dalton  : 1960    MRN: [id number]                              Today's Date: 3/16/2021       Admit Date: 3/7/2021    Visit Dx:     ICD-10-CM ICD-9-CM   1. Cerebrovascular accident (CVA), unspecified mechanism (CMS/HCC)  I63.9 434.91     Patient Active Problem List   Diagnosis   • Cerebrovascular accident (CVA) (CMS/HCC)     Past Medical History:   Diagnosis Date   • Elevated cholesterol    • Hypertension    • Obesity    • Seizure (CMS/HCC)    • Seizures (CMS/HCC)    • Stroke (CMS/HCC)      Past Surgical History:   Procedure Laterality Date   • CAROTID ENDARTERECTOMY Right    • CAROTID ENDARTERECTOMY Right    • EMBOLECTOMY N/A 3/7/2021    Procedure: MECHANICAL EMBOLECTOMY, WITH LEFT CAROTID ARTERY ANGIOPLASTY AND STENT PLACEMENT;  Surgeon: Nick Washington MD;  Location: [facility] ;  Service: Neurosurgery;  Laterality: N/A;     General Information     Row Name 21          Physical Therapy Time and Intention    Document Type  therapy note (daily note)  (Pended)   -MG     Mode of Treatment  individual therapy;physical therapy  (Pended)   -MG     Row Name 21 164          General Information    Existing Precautions/Restrictions  fall  (Pended)   -MG     Row Name 21          Cognition    Orientation Status (Cognition)  oriented to;person;place  (Pended)   -MG     Row Name 21          Safety Issues, Functional Mobility    Safety Issues Affecting Function (Mobility)  impulsivity;judgment;insight into deficits/self-awareness  (Pended)   -MG     Impairments Affecting Function (Mobility)  balance;cognition;coordination;endurance/activity tolerance;strength  (Pended)   -MG     Comment, Safety Issues/Impairments (Mobility)  gait belt and non slip socks used for safety  (Pended)   -MG       User Key  (r) = Recorded By, (t) = Taken By, (c) = Cosigned By    Initials Name Provider Type    MG Amara iJmenez PTA Student PTA Student        Mobility      Row Name 03/16/21 1649          Bed Mobility    Comment (Bed Mobility)  NT UIC  (Pended)   -MG     Row Name 03/16/21 1649          Transfers    Comment (Transfers)  5xSTS for functional strength  (Pended)   -MG     Row Name 03/16/21 1649          Sit-Stand Transfer    Sit-Stand [place] (Transfers)  contact guard;verbal cues;nonverbal cues (demo/gesture)  (Pended)   -MG     Assistive Device (Sit-Stand Transfers)  --  (Pended)  noAD  -MG     Row Name 03/16/21 1649          Gait/Stairs (Locomotion)    [place] Level (Gait)  contact guard;minimum assist (75% patient effort);verbal cues;nonverbal cues (demo/gesture)  (Pended)   -MG     Distance in Feet (Gait)  400ft, unsteady and minor LOB but able to self correct.  (Pended)   -MG     Deviations/Abnormal Patterns (Gait)  gait speed decreased;brian decreased;ataxic  (Pended)   -MG       User Key  (r) = Recorded By, (t) = Taken By, (c) = Cosigned By    Initials Name Provider Type    Amara Bradley, PTA Student PTA Student        Obj/Interventions     Row Name 03/16/21 1651          Motor Skills    Therapeutic Exercise  --  (Pended)  B LE exercises: STS, standing marches, LAQ, hip abd/add, QS, AP(x5-10)  -MG       User Key  (r) = Recorded By, (t) = Taken By, (c) = Cosigned By    Initials Name Provider Type    Amara Bradley, PTA Student PTA Student        Goals/Plan    No documentation.       Clinical Impression     Row Name 03/16/21 1652          Plan of Care Review    Plan of Care Reviewed With  patient  (Pended)   -MG     Progress  improving  (Pended)   -MG     Outcome Summary  pt tolerated treatment well today. pt was CGA for STS transfers. pt ambulated 400ft with no AD and CGA/Crescencio. pt was unsteady and easily distracted during ambulation. he had some minor LOB but was able to self correct. pt performed B LE exercises. continue to progress pt as tolerated.  (Pended)   -MG       User Key  (r) = Recorded By, (t) = Taken By, (c) = Cosigned By     Initials Name Provider Type    MG Amara Jimenez, PTA Student PTA Student        Outcome Measures     Row Name 03/16/21 1654          Modified [place] Scale    Modified [place] Scale  4 - Moderately severe disability.  Unable to walk without assistance, and unable to attend to own bodily needs without assistance.  (Pended)   -       User Key  (r) = Recorded By, (t) = Taken By, (c) = Cosigned By    Initials Name Provider Type    MG Amara Jimenez, PTA Student PTA Student        Physical Therapy Education                 Title: PT OT SLP Therapies (In Progress)     Topic: Physical Therapy (Done)     Point: Mobility training (Done)     Learning Progress Summary           Patient Acceptance, E,D, NR,DU by  at 3/16/2021 1655    Acceptance, E,D, NR by  at 3/15/2021 1221    Acceptance, E,TB,D, VU,NR by  at 3/13/2021 1632                   Point: Home exercise program (Done)     Learning Progress Summary           Patient Acceptance, E,D, NR,DU by  at 3/16/2021 1655    Acceptance, E,D, NR by  at 3/15/2021 1221    Acceptance, E,TB,D, VU,NR by  at 3/13/2021 1632                               User Key     Initials Effective Dates Name Provider Type Discipline     04/03/18 -  Krystina Beltrán, PT Physical Therapist PT     02/26/21 -  Amara Jimenez, PTA Student PTA Student PT              PT Recommendation and Plan     Plan of Care Reviewed With: (P) patient  Progress: (P) improving  Outcome Summary: (P) pt tolerated treatment well today. pt was CGA for STS transfers. pt ambulated 400ft with no AD and CGA/Crescencio. pt was unsteady and easily distracted during ambulation. he had some minor LOB but was able to self correct. pt performed B LE exercises. continue to progress pt as tolerated.     Time Calculation:   PT Charges     Row Name 03/16/21 2393             Time Calculation    Start Time  1357  (Pended)   -MG      Stop Time  1414  (Pended)   -MG      Time Calculation (min)  17 min  (Pended)   -MG      PT  Received On  03/16/21  (Pended)   -MG      PT - Next Appointment  03/17/21  (Pended)   -MG         Time Calculation- PT    Total Timed Code Minutes- PT  17 minute(s)  (Pended)   -MG        User Key  (r) = Recorded By, (t) = Taken By, (c) = Cosigned By    Initials Name Provider Type    MG Amara Jimenez, KIP Student PTA Student        Therapy Charges for Today     Code Description Service Date Service Provider Modifiers Qty    34714271449 HC GAIT TRAINING EA 15 MIN 3/15/2021 Amara Jimenez, KIP Student GP 1    [id number] HC GAIT TRAINING EA 15 MIN 3/16/2021 Amara Jimenez, KIP Student GP 1          PT G-Codes  Outcome Measure Options: Modified [place]  Modified [place] Scale: (P) 4 - Moderately severe disability.  Unable to walk without assistance, and unable to attend to own bodily needs without assistance.    KIP Juárez  3/16/2021

## 2021-03-16 NOTE — PLAN OF CARE
Problem: Adult Inpatient Plan of Care  Goal: Plan of Care Review  Outcome: Ongoing, Progressing  Goal: Patient-Specific Goal (Individualized)  Outcome: Ongoing, Progressing  Goal: Absence of Hospital-Acquired Illness or Injury  Outcome: Ongoing, Progressing  Intervention: Identify and Manage Fall Risk  Recent Flowsheet Documentation  Taken 3/16/2021 1604 by Arjun De La Torre RN  Safety Promotion/Fall Prevention:   activity supervised   assistive device/personal items within reach   clutter free environment maintained   fall prevention program maintained   gait belt   elopement precautions   lighting adjusted   mobility aid in reach   muscle strengthening facilitated   nonskid shoes/slippers when out of bed   room organization consistent   safety round/check completed   toileting scheduled  Taken 3/16/2021 1415 by Arjun De La Torre RN  Safety Promotion/Fall Prevention:   activity supervised   assistive device/personal items within reach   clutter free environment maintained   elopement precautions   fall prevention program maintained   gait belt   lighting adjusted   mobility aid in reach   muscle strengthening facilitated   nonskid shoes/slippers when out of bed   room organization consistent   safety round/check completed   toileting scheduled  Taken 3/16/2021 1233 by Arjun De La Torre RN  Safety Promotion/Fall Prevention:   activity supervised   assistive device/personal items within reach   clutter free environment maintained   elopement precautions   fall prevention program maintained   gait belt   mobility aid in reach   muscle strengthening facilitated   lighting adjusted   nonskid shoes/slippers when out of bed   room organization consistent   toileting scheduled   safety round/check completed  Taken 3/16/2021 1003 by Arjun De La Torre RN  Safety Promotion/Fall Prevention:   activity supervised   assistive device/personal items within reach   clutter free environment maintained   elopement precautions   fall  prevention program maintained   gait belt   lighting adjusted   mobility aid in reach   muscle strengthening facilitated   nonskid shoes/slippers when out of bed   room organization consistent   safety round/check completed   toileting scheduled  Taken 3/16/2021 0819 by Arjun De La Torre RN  Safety Promotion/Fall Prevention:   activity supervised   assistive device/personal items within reach   clutter free environment maintained   elopement precautions   fall prevention program maintained   gait belt   lighting adjusted   mobility aid in reach   muscle strengthening facilitated   nonskid shoes/slippers when out of bed   room organization consistent   safety round/check completed   toileting scheduled  Intervention: Prevent Skin Injury  Recent Flowsheet Documentation  Taken 3/16/2021 1604 by Arjun De La Torre RN  Body Position: position maintained  Taken 3/16/2021 1415 by Arjun De La Torre RN  Body Position: semi-prone, right  Taken 3/16/2021 1233 by Arjun De La Torre RN  Body Position: semi-prone, left  Taken 3/16/2021 1003 by Arjun De La Torre RN  Body Position: semi-prone, right  Taken 3/16/2021 0819 by Arjun De La Torre RN  Body Position: sitting up in bed  Intervention: Prevent and Manage VTE (venous thromboembolism) Risk  Recent Flowsheet Documentation  Taken 3/16/2021 1604 by Arjun De La Torre RN  VTE Prevention/Management:   bilateral   sequential compression devices on  Taken 3/16/2021 1415 by Arjun De La Torre RN  VTE Prevention/Management:   bilateral   sequential compression devices on  Taken 3/16/2021 1233 by Arjun De La Torre RN  VTE Prevention/Management:   bilateral   sequential compression devices on  Taken 3/16/2021 1003 by Arjun De La Torre RN  VTE Prevention/Management:   bilateral   sequential compression devices on  Taken 3/16/2021 0819 by Arjun De La Torre RN  VTE Prevention/Management:   bilateral   sequential compression devices on  Intervention: Prevent Infection  Recent Flowsheet Documentation  Taken 3/16/2021  1604 by Arjun De La Torre RN  Infection Prevention:   cohorting utilized   environmental surveillance performed   equipment surfaces disinfected   hand hygiene promoted   personal protective equipment utilized   rest/sleep promoted   single patient room provided   visitors restricted/screened  Taken 3/16/2021 1415 by Arjun De La Torre RN  Infection Prevention:   cohorting utilized   environmental surveillance performed   equipment surfaces disinfected   hand hygiene promoted   rest/sleep promoted   personal protective equipment utilized   single patient room provided   visitors restricted/screened  Taken 3/16/2021 1233 by Arjun De La Torre RN  Infection Prevention:   cohorting utilized   equipment surfaces disinfected   environmental surveillance performed   hand hygiene promoted   rest/sleep promoted   personal protective equipment utilized   single patient room provided   visitors restricted/screened  Taken 3/16/2021 1003 by Arjun De La Torre RN  Infection Prevention:   cohorting utilized   environmental surveillance performed   equipment surfaces disinfected   hand hygiene promoted   personal protective equipment utilized   single patient room provided   visitors restricted/screened   rest/sleep promoted  Taken 3/16/2021 0819 by Arjun De La Torre RN  Infection Prevention:   cohorting utilized   environmental surveillance performed   equipment surfaces disinfected   hand hygiene promoted   personal protective equipment utilized   rest/sleep promoted   single patient room provided   visitors restricted/screened  Goal: Optimal Comfort and Wellbeing  Outcome: Ongoing, Progressing  Goal: Readiness for Transition of Care  Outcome: Ongoing, Progressing     Problem: Skin Injury Risk Increased  Goal: Skin Health and Integrity  Outcome: Ongoing, Progressing  Intervention: Optimize Skin Protection  Recent Flowsheet Documentation  Taken 3/16/2021 1604 by Arjun De La Torre RN  Head of Bed (HOB): HOB at 60 degrees  Taken 3/16/2021 1415 by  Arjun De La Torre RN  Head of Bed (HOB): HOB at 30-45 degrees  Taken 3/16/2021 1233 by Arjun De La Torre RN  Head of Bed (HOB): HOB at 30-45 degrees  Taken 3/16/2021 1003 by Arjun De La Torre RN  Head of Bed (HOB): HOB at 30-45 degrees  Taken 3/16/2021 0819 by Arjun De La Torre RN  Head of Bed (HOB): HOB at 20-30 degrees  Pressure Reduction Devices: alternating pressure pump (ADD)     Problem: Fall Injury Risk  Goal: Absence of Fall and Fall-Related Injury  Outcome: Ongoing, Progressing  Intervention: Identify and Manage Contributors to Fall Injury Risk  Recent Flowsheet Documentation  Taken 3/16/2021 1604 by Arjun De La Torre RN  Medication Review/Management: medications reviewed  Taken 3/16/2021 1415 by Arjun De La Torre RN  Medication Review/Management: medications reviewed  Taken 3/16/2021 1233 by Arjun De La Torre RN  Medication Review/Management: medications reviewed  Taken 3/16/2021 1003 by Arjun De La Torre RN  Medication Review/Management: medications reviewed  Taken 3/16/2021 0819 by Arjun De La Torre RN  Medication Review/Management: medications reviewed  Intervention: Promote Injury-Free Environment  Recent Flowsheet Documentation  Taken 3/16/2021 1604 by Arjun De La Torre RN  Safety Promotion/Fall Prevention:   activity supervised   assistive device/personal items within reach   clutter free environment maintained   fall prevention program maintained   gait belt   elopement precautions   lighting adjusted   mobility aid in reach   muscle strengthening facilitated   nonskid shoes/slippers when out of bed   room organization consistent   safety round/check completed   toileting scheduled  Taken 3/16/2021 1415 by Arjun De La Torre RN  Safety Promotion/Fall Prevention:   activity supervised   assistive device/personal items within reach   clutter free environment maintained   elopement precautions   fall prevention program maintained   gait belt   lighting adjusted   mobility aid in reach   muscle strengthening facilitated    nonskid shoes/slippers when out of bed   room organization consistent   safety round/check completed   toileting scheduled  Taken 3/16/2021 1233 by Arjun De La Torre RN  Safety Promotion/Fall Prevention:   activity supervised   assistive device/personal items within reach   clutter free environment maintained   elopement precautions   fall prevention program maintained   gait belt   mobility aid in reach   muscle strengthening facilitated   lighting adjusted   nonskid shoes/slippers when out of bed   room organization consistent   toileting scheduled   safety round/check completed  Taken 3/16/2021 1003 by Arjun De La Torre RN  Safety Promotion/Fall Prevention:   activity supervised   assistive device/personal items within reach   clutter free environment maintained   elopement precautions   fall prevention program maintained   gait belt   lighting adjusted   mobility aid in reach   muscle strengthening facilitated   nonskid shoes/slippers when out of bed   room organization consistent   safety round/check completed   toileting scheduled  Taken 3/16/2021 0819 by Arjun De La Torre RN  Safety Promotion/Fall Prevention:   activity supervised   assistive device/personal items within reach   clutter free environment maintained   elopement precautions   fall prevention program maintained   gait belt   lighting adjusted   mobility aid in reach   muscle strengthening facilitated   nonskid shoes/slippers when out of bed   room organization consistent   safety round/check completed   toileting scheduled     Problem: Adjustment to Illness (Stroke, Ischemic/Transient Ischemic Attack)  Goal: Optimal Coping  Outcome: Ongoing, Progressing     Problem: Bowel Elimination Impaired (Stroke, Ischemic/Transient Ischemic Attack)  Goal: Effective Bowel Elimination  Outcome: Ongoing, Progressing     Problem: Cerebral Tissue Perfusion Risk (Stroke, Ischemic/Transient Ischemic Attack)  Goal: Optimal Cerebral Tissue Perfusion  Outcome: Ongoing,  Progressing  Intervention: Optimize Oxygenation and Ventilation  Recent Flowsheet Documentation  Taken 3/16/2021 1604 by Arjun De La Torre RN  Head of Bed (HOB): HOB at 60 degrees  Taken 3/16/2021 1415 by Arjun De La Torre RN  Head of Bed (HOB): HOB at 30-45 degrees  Taken 3/16/2021 1233 by Arjun De La Torre RN  Head of Bed (HOB): HOB at 30-45 degrees  Taken 3/16/2021 1003 by Arjun De La Torre RN  Head of Bed (HOB): HOB at 30-45 degrees  Taken 3/16/2021 0819 by Arjun De La Torre RN  Head of Bed (HOB): HOB at 20-30 degrees     Problem: Communication Impairment (Stroke, Ischemic/Transient Ischemic Attack)  Goal: Improved Communication Skills  Outcome: Ongoing, Progressing     Problem: Eating/Swallowing Impairment (Stroke, Ischemic/Transient Ischemic Attack)  Goal: Oral Intake without Aspiration  Outcome: Ongoing, Progressing     Problem: Functional Ability Impaired (Stroke, Ischemic/Transient Ischemic Attack)  Goal: Optimal Functional Ability  Outcome: Ongoing, Progressing  Intervention: Optimize Functional Ability  Recent Flowsheet Documentation  Taken 3/16/2021 1604 by Arjun De La Torre RN  Activity Management:   activity adjusted per tolerance   up in chair  Taken 3/16/2021 1415 by Arjun De La Torre RN  Activity Management: activity adjusted per tolerance  Taken 3/16/2021 1233 by Ajrun De La Torre RN  Activity Management: activity adjusted per tolerance  Taken 3/16/2021 1003 by Arjun De La Torre RN  Activity Management: activity adjusted per tolerance  Taken 3/16/2021 0819 by Arjun De La Torre RN  Activity Management: activity adjusted per tolerance     Problem: Hemodynamic Instability (Stroke, Ischemic/Transient Ischemic Attack)  Goal: Vital Signs Remain in Desired Range  Outcome: Ongoing, Progressing     Problem: Pain (Stroke, Ischemic/Transient Ischemic Attack)  Goal: Acceptable Pain Control  Outcome: Ongoing, Progressing     Problem: Sensorimotor Impairment (Stroke, Ischemic/Transient Ischemic Attack)  Goal: Improved Sensorimotor  Function  Outcome: Ongoing, Progressing  Intervention: Optimize Range of Motion, Motor Control and Function  Recent Flowsheet Documentation  Taken 3/16/2021 1604 by Arjun De La Torre RN  Positioning/Transfer Devices:   pillows   in use  Taken 3/16/2021 1415 by Arjun De La Torre RN  Positioning/Transfer Devices:   pillows   in use  Taken 3/16/2021 1233 by Arjun De La Torre RN  Positioning/Transfer Devices:   pillows   in use  Taken 3/16/2021 1003 by Arjun De La Torre RN  Positioning/Transfer Devices:   pillows   in use  Taken 3/16/2021 0819 by Arjun De La Torre RN  Positioning/Transfer Devices:   pillows   in use  Intervention: Optimize Sensory and Perceptual Abilities  Recent Flowsheet Documentation  Taken 3/16/2021 0819 by Arjun De La Torre RN  Pressure Reduction Devices: alternating pressure pump (ADD)     Problem: Urinary Elimination Impaired (Stroke, Ischemic/Transient Ischemic Attack)  Goal: Effective Urinary Elimination  Outcome: Ongoing, Progressing     Problem: Communication Impairment (Mechanical Ventilation, Invasive)  Goal: Effective Communication  Outcome: Ongoing, Progressing     Problem: Device-Related Complication Risk (Mechanical Ventilation, Invasive)  Goal: Optimal Device Function  Outcome: Ongoing, Progressing  Intervention: Optimize Device Care and Function  Recent Flowsheet Documentation  Taken 3/16/2021 1604 by Arjun De La Torre RN  Airway Safety Measures: mask at bedside  Taken 3/16/2021 1415 by Arjun De La Torre RN  Airway Safety Measures: mask at bedside  Taken 3/16/2021 1233 by Arjun De La Torre RN  Airway Safety Measures: mask at bedside  Taken 3/16/2021 1003 by Arjun De La Torre RN  Airway Safety Measures: mask at bedside  Taken 3/16/2021 0819 by Arjun De La Torre RN  Airway Safety Measures: mask at bedside     Problem: Nutrition Impairment (Mechanical Ventilation, Invasive)  Goal: Optimal Nutrition Delivery  Outcome: Ongoing, Progressing     Problem: Skin and Tissue Injury (Mechanical Ventilation,  Invasive)  Goal: Absence of Device-Related Skin and Tissue Injury  Outcome: Ongoing, Progressing     Problem: Ventilator-Induced Lung Injury (Mechanical Ventilation, Invasive)  Goal: Absence of Ventilator-Induced Lung Injury  Outcome: Ongoing, Progressing  Intervention: Prevent Ventilator-Associated Pneumonia  Recent Flowsheet Documentation  Taken 3/16/2021 1604 by Arjun De La Torre RN  Head of Bed ([facility]): HOB at 60 degrees  Taken 3/16/2021 1415 by Arjun De La Torre RN  Head of Bed (HOB): HOB at 30-45 degrees  Taken 3/16/2021 1233 by Arjun De La Torre RN  Head of Bed (HOB): HOB at 30-45 degrees  Taken 3/16/2021 1003 by Arjun De La Torre RN  Head of Bed (HOB): HOB at 30-45 degrees  Taken 3/16/2021 0819 by Arjun De La Torre RN  Head of Bed ([facility]): HOB at 20-30 degrees     Problem: Fluid Imbalance (Pneumonia)  Goal: Fluid Balance  Outcome: Ongoing, Progressing     Problem: Infection (Pneumonia)  Goal: Resolution of Infection Signs and Symptoms  Outcome: Ongoing, Progressing     Problem: Respiratory Compromise (Pneumonia)  Goal: Effective Oxygenation and Ventilation  Outcome: Ongoing, Progressing  Intervention: Optimize Oxygenation and Ventilation  Recent Flowsheet Documentation  Taken 3/16/2021 1604 by Arjun De La Torre RN  Head of Bed ([facility]): HOB at 60 degrees  Taken 3/16/2021 1415 by Arjun De La Torre RN  Head of Bed (HOB): HOB at 30-45 degrees  Taken 3/16/2021 1233 by Arjun De La Torre RN  Head of Bed (HOB): HOB at 30-45 degrees  Taken 3/16/2021 1003 by Arjun De La Torre RN  Head of Bed ([facility]): HOB at 30-45 degrees  Taken 3/16/2021 0819 by Arjun De La Torre RN  Head of Bed ([facility]): HOB at 20-30 degrees   Goal Outcome Evaluation:

## 2021-03-16 NOTE — THERAPY EVALUATION
Patient Name: Jose R Dalton  : 1960    MRN: [id number]                              Today's Date: 3/16/2021       Admit Date: 3/7/2021    Visit Dx:     ICD-10-CM ICD-9-CM   1. Cerebrovascular accident (CVA), unspecified mechanism (CMS/HCC)  I63.9 434.91     Patient Active Problem List   Diagnosis   • Cerebrovascular accident (CVA) (CMS/HCC)     Past Medical History:   Diagnosis Date   • Elevated cholesterol    • Hypertension    • Obesity    • Seizure (CMS/HCC)    • Seizures (CMS/HCC)    • Stroke (CMS/HCC)      Past Surgical History:   Procedure Laterality Date   • CAROTID ENDARTERECTOMY Right    • CAROTID ENDARTERECTOMY Right    • EMBOLECTOMY N/A 3/7/2021    Procedure: MECHANICAL EMBOLECTOMY, WITH LEFT CAROTID ARTERY ANGIOPLASTY AND STENT PLACEMENT;  Surgeon: Nick Washington MD;  Location: [facility] ;  Service: Neurosurgery;  Laterality: N/A;     General Information     Row Name 21 1229          OT Time and Intention    Document Type  evaluation  -ROMAIN     Mode of Treatment  individual therapy;occupational therapy  -ROMAIN     Row Name 21 1229          General Information    Patient Profile Reviewed  yes  -ROMAIN     Prior Level of Function  independent:;ADL's  -ROMAIN     Existing Precautions/Restrictions  fall  -ROMAIN     Barriers to Rehab  medically complex;cognitive status  -ROMAIN     Row Name 21 1229          Occupational Profile    Reason for Services/Referral (Occupational Profile)  Pt admit due to acute CVA with left carotic occlusion. Successful revacularization with aspiration, thrombectomy, and angioplasty and stent placement on 3/7. Prior right MCA CVA in the past, with history of with metabolic acidosis, leukocytosis, aspiration PNA.  -ROMAIN     Row Name 21 1229          Living Environment    Lives With  alone  -ROMAIN     Row Name 21 1229          Home Main Entrance    Number of Stairs, Main Entrance  -- pt reports having 15 steps total to access apt  -ROMAIN     Row Name 21  1229          Cognition    Orientation Status (Cognition)  oriented to;person;place  -ROMAIN     Row Name 03/16/21 1229          Safety Issues, Functional Mobility    Safety Issues Affecting Function (Mobility)  awareness of need for assistance;insight into deficits/self-awareness;judgment;positioning of assistive device;safety precautions follow-through/compliance;sequencing abilities  -ROMAIN     Impairments Affecting Function (Mobility)  balance;cognition;coordination;endurance/activity tolerance;strength;motor control;range of motion (ROM)  -ROMAIN     Cognitive Impairments, Mobility Safety/Performance  attention;awareness, need for assistance;insight into deficits/self-awareness;judgment;safety precaution follow-through;sequencing abilities;problem-solving/reasoning  -ROMAIN     Comment, Safety Issues/Impairments (Mobility)  gait belt and non skid socks were used for safety  -ROMAIN       User Key  (r) = Recorded By, (t) = Taken By, (c) = Cosigned By    Initials Name Provider Type    ROMAIN Rayo Vallejo, OT Occupational Therapist          Mobility/ADL's     Row Name 03/16/21 1231          Bed Mobility    Bed Mobility  supine-sit;sit-supine;scooting/bridging  -ROMAIN     Scooting/Bridging [place] (Bed Mobility)  supervision  -ROMAIN     Supine-Sit [place] (Bed Mobility)  supervision  -ROMAIN     Sit-Supine [place] (Bed Mobility)  supervision  -ROMAIN     Assistive Device (Bed Mobility)  bed rails;head of bed elevated  -ROMAIN     Comment (Bed Mobility)  moved well in bed, despite foot of bed being stuck in elevated position. Nursing aware and reporting bed issue.  -ROMAIN     Row Name 03/16/21 1231          Transfers    Transfers  sit-stand transfer  -ROMAIN     Sit-Stand [place] (Transfers)  contact guard;verbal cues;nonverbal cues (demo/gesture)  -ROMAIN     Row Name 03/16/21 1231          Sit-Stand Transfer    Assistive Device (Sit-Stand Transfers)  walker, front-wheeled  -ROMAIN     Row Name 03/16/21 1231          Functional Mobility     Functional Mobility- Ind. Level  contact guard assist;verbal cues required  -ROMAIN     Functional Mobility- Device  rolling walker  -ROMAIN     Functional Mobility- Comment  performed functional mobility from bed <> restroom with RWx and CGA with VCs. Required to leave Rwx on ground versus intermittently picking it up. 1-2 LOBs to R during turns.  -ROMAIN     Row Name 03/16/21 1231          Activities of Daily Living    BADL Assessment/Intervention  grooming;feeding;lower body dressing  -ROMAIN     Row Name 03/16/21 1231          Grooming Assessment/Training    [place] Level (Grooming)  grooming skills;oral care regimen;wash face, hands;contact guard assist  -ROMAIN     Position (Grooming)  sink side;supported standing;unsupported standing  -ROMAIN     Row Name 03/16/21 1231          Self-Feeding Assessment/Training    [place] Level (Feeding)  feeding skills;set up  -ROMAIN     Position (Self-Feeding)  sitting up in bed  -ROMAIN     Row Name 03/16/21 1231          Lower Body Dressing Assessment/Training    [place] Level (Lower Body Dressing)  lower body dressing skills;doff;don;socks;standby assist  -ROMAIN     Position (Lower Body Dressing)  edge of bed sitting  -ROMAIN       User Key  (r) = Recorded By, (t) = Taken By, (c) = Cosigned By    Initials Name Provider Type    ROMAINRayo Knutson, OT Occupational Therapist        Obj/Interventions     Row Name 03/16/21 1234          Sensory Assessment (Somatosensory)    Sensory Assessment (Somatosensory)  UE sensation intact  -ROMAIN     Row Name 03/16/21 1234          Vision Assessment/Intervention    Visual Impairment/Limitations  WFL  -ROMAIN     Row Name 03/16/21 1234          Range of Motion Comprehensive    Comment, General Range of Motion  Unable to pronate BUEs >1/3 full ROM with hard end range. Otherwise B UEs WFL.  -ROMAIN     Row Name 03/16/21 1234          Strength Comprehensive (MMT)    Comment, General Manual Muscle Testing (MMT) Assessment  B UE MMT grossly 4/5  -ROMAIN     Row Name 03/16/21 1234           Balance    Balance Assessment  sitting static balance;sitting dynamic balance;sit to stand dynamic balance;standing static balance;standing dynamic balance  -ROMAIN     Static Sitting Balance  WFL;sitting, edge of bed  -ROMAIN     Dynamic Sitting Balance  WFL;sitting, edge of bed  -ROMAIN     Sit to Stand Dynamic Balance  mild impairment;supported;standing  -ROMAIN     Static Standing Balance  mild impairment;supported;standing CGA with RWx  -ROMAIN     Dynamic Standing Balance  mild impairment;supported;standing CGA with RWx  -ROMAIN     Balance Interventions  sitting;standing;sit to stand;supported;static;dynamic;occupation based/functional task  -ROMAIN     Comment, Balance  performed grooming tasks at sinkside with CGA. Fluctuated between supported/unsupported stand. No overt LOBs. 1-2 LOBs were noted only during funcitonal mobility tasks during turns.  -ROMAIN       User Key  (r) = Recorded By, (t) = Taken By, (c) = Cosigned By    Initials Name Provider Type    Rayo Brown, OT Occupational Therapist        Goals/Plan     Row Name 03/16/21 1248          Transfer Goal 1 (OT)    Activity/Assistive Device (Transfer Goal 1, OT)  transfers, all;toilet  -ROMAIN     [place] Level/Cues Needed (Transfer Goal 1, OT)  modified independence  -ROMAIN     Time Frame (Transfer Goal 1, OT)  2 weeks;short term goal (STG)  -ROMAIN     Progress/Outcome (Transfer Goal 1, OT)  goal ongoing  -ROMAIN     Row Name 03/16/21 1248          Bathing Goal 1 (OT)    Activity/Device (Bathing Goal 1, OT)  bathing skills, all  -ROMAIN     [place] Level/Cues Needed (Bathing Goal 1, OT)  set-up required  -ROMAIN     Time Frame (Bathing Goal 1, OT)  2 weeks;short term goal (STG)  -ROMAIN     Progress/Outcomes (Bathing Goal 1, OT)  goal ongoing  -ROMAIN     Row Name 03/16/21 1248          Toileting Goal 1 (OT)    Activity/Device (Toileting Goal 1, OT)  toileting skills, all  -ROMAIN     [place] Level/Cues Needed (Toileting Goal 1, OT)  standby assist  -ROMAIN     Time Frame (Toileting Goal  1, OT)  2 weeks;short term goal (STG)  -ROMAIN     Progress/Outcome (Toileting Goal 1, OT)  goal ongoing  -ROMAIN     Row Name 03/16/21 1248          Grooming Goal 1 (OT)    Activity/Device (Grooming Goal 1, OT)  grooming skills, all at sinkside  -ROMAIN     [place] (Grooming Goal 1, OT)  set-up required  -ROMAIN     Time Frame (Grooming Goal 1, OT)  2 weeks;short term goal (STG)  -ROMAIN     Progress/Outcome (Grooming Goal 1, OT)  goal ongoing  -ROMAIN     Row Name 03/16/21 1248          Therapy Assessment/Plan (OT)    Planned Therapy Interventions (OT)  activity tolerance training;adaptive equipment training;BADL retraining;functional balance retraining;occupation/activity based interventions;patient/caregiver education/training;strengthening exercise;transfer/mobility retraining  -ROMAIN       User Key  (r) = Recorded By, (t) = Taken By, (c) = Cosigned By    Initials Name Provider Type    ROMAINRayo Knutson, OT Occupational Therapist        Clinical Impression     Row Name 03/16/21 1240          Pain Assessment    Additional Documentation  Pain Scale: Numbers Pre/Post-Treatment (Group)  -ROMAIN     Row Name 03/16/21 1240          Pain Scale: Numbers Pre/Post-Treatment    Pretreatment Pain Rating  0/10 - no pain  -ROMAIN     Posttreatment Pain Rating  0/10 - no pain  -ROMAIN     Row Name 03/16/21 1240          Plan of Care Review    Plan of Care Reviewed With  patient  -ROMAIN     Outcome Summary  Pt is a 60 y.o. male admitted to [facility] with an acute CVA with left carotic occlusion, s/p successful revacularization with aspiration, thrombectomy, and angioplasty and stent placement on 3/7. Pt reports living alone with family that is able to assist when needed. Pt reports occasionally needing some A with dressing tasks due to difficulty with maniplating small clothing items. Today, pt presented with cognitive deficits, impaired balance, and decreased overall safety and indep with ADLs and funcitonal tasks. Pt supervision for bed mobility and LB dressing  (donning/doffing B socks). Pt with need for CGA with VCs for functional transfers and functional mobility tasks utilizing a RWx from bed <> restroom. Pt with 1-2 LOBs to the R during turns. Pt able to perform grooming tasks at sinkside supported/unsupported with intermittent assist required for sequencing. Pt would continue to benefit from skilled OT services to address functional deficits and maximize level of safety/indep with ADLs for safe return to PLOF. At time of evaluation, posey vest and B soft wrist restraints were removed for full pt participation, and then were reapplied after OT evaluation.  -ROMAIN     Row Name 03/16/21 1240          Therapy Assessment/Plan (OT)    Rehab Potential (OT)  good, to achieve stated therapy goals  -ROMAIN     Criteria for Skilled Therapeutic Interventions Met (OT)  yes  -ROMAIN     Therapy Frequency (OT)  3 times/wk  -ROMAIN     Row Name 03/16/21 1240          Positioning and Restraints    Pre-Treatment Position  in bed  -ROMAIN     Post Treatment Position  bed  -ROMAIN     In Bed  notified nsg;supine;call light within reach;encouraged to call for assist;exit alarm on  -ROMAIN     Restraints  released:;reapplied:;notified nsg:;soft limb;vest nursing reports may trial discontinuing B soft wrist restraints when daughter arrives, and then may possiby trial removal of posey vest later this date.  -ROMAIN       User Key  (r) = Recorded By, (t) = Taken By, (c) = Cosigned By    Initials Name Provider Type    Rayo Brown OT Occupational Therapist        Outcome Measures     Row Name 03/16/21 1252          Modified [place] Scale    Modified Ishmael Scale  4 - Moderately severe disability.  Unable to walk without assistance, and unable to attend to own bodily needs without assistance.  -ROMAIN       User Key  (r) = Recorded By, (t) = Taken By, (c) = Cosigned By    Initials Name Provider Type    Rayo Brown OT Occupational Therapist        Occupational Therapy Education                 Title: PT OT SLP  Therapies (In Progress)     Topic: Occupational Therapy (In Progress)     Point: ADL training (Done)     Description:   Instruct learner(s) on proper safety adaptation and remediation techniques during self care or transfers.   Instruct in proper use of assistive devices.              Learning Progress Summary           Patient Acceptance, E, VU by ROMAIN at 3/16/2021 1252    Comment: Pt educated on OT role, OT plan of care, and safety techniques during functional moblity and ADLs.                   Point: Home exercise program (Not Started)     Description:   Instruct learner(s) on appropriate technique for monitoring, assisting and/or progressing therapeutic exercises/activities.              Learner Progress:  Not documented in this visit.          Point: Precautions (Done)     Description:   Instruct learner(s) on prescribed precautions during self-care and functional transfers.              Learning Progress Summary           Patient Acceptance, E, VU by ROMAIN at 3/16/2021 1252    Comment: Pt educated on OT role, OT plan of care, and safety techniques during functional moblity and ADLs.                   Point: Body mechanics (Done)     Description:   Instruct learner(s) on proper positioning and spine alignment during self-care, functional mobility activities and/or exercises.              Learning Progress Summary           Patient Acceptance, E, VU by ROMAIN at 3/16/2021 1252    Comment: Pt educated on OT role, OT plan of care, and safety techniques during functional moblity and ADLs.                               User Key     Initials Effective Dates Name Provider Type Discipline    ROMAIN 09/14/20 -  Rayo Vallejo, OT Occupational Therapist OT              OT Recommendation and Plan  Planned Therapy Interventions (OT): activity tolerance training, adaptive equipment training, BADL retraining, functional balance retraining, occupation/activity based interventions, patient/caregiver education/training, strengthening  exercise, transfer/mobility retraining  Therapy Frequency (OT): 3 times/wk  Plan of Care Review  Plan of Care Reviewed With: patient  Outcome Summary: Pt is a 60 y.o. male admitted to [facility] with an acute CVA with left carotic occlusion, s/p successful revacularization with aspiration, thrombectomy, and angioplasty and stent placement on 3/7. Pt reports living alone with family that is able to assist when needed. Pt reports occasionally needing some A with dressing tasks due to difficulty with maniplating small clothing items. Today, pt presented with cognitive deficits, impaired balance, and decreased overall safety and indep with ADLs and funcitonal tasks. Pt supervision for bed mobility and LB dressing (donning/doffing B socks). Pt with need for CGA with VCs for functional transfers and functional mobility tasks utilizing a RWx from bed <> restroom. Pt with 1-2 LOBs to the R during turns. Pt able to perform grooming tasks at sinkside supported/unsupported with intermittent assist required for sequencing. Pt would continue to benefit from skilled OT services to address functional deficits and maximize level of safety/indep with ADLs for safe return to PLOF. At time of evaluation, posey vest and B soft wrist restraints were removed for full pt participation, and then were reapplied after OT evaluation.     Time Calculation:   Time Calculation- OT     Row Name 03/16/21 1255             Time Calculation- OT    OT Start Time  0913  -ROMAIN      OT Stop Time  0941  -ROMAIN      OT Time Calculation (min)  28 min  -ROMAIN      Total Timed Code Minutes- OT  23 minute(s)  -ROMAIN      OT Received On  03/16/21  -ROMAIN      OT - Next Appointment  03/17/21  -ROMAIN      OT Goal Re-Cert Due Date  03/30/21  -ROMAIN        User Key  (r) = Recorded By, (t) = Taken By, (c) = Cosigned By    Initials Name Provider Type    Rayo Brown, OT Occupational Therapist        Therapy Charges for Today     Code Description Service Date Service Provider Modifiers  Qty    43652467026 HC OT EVAL MOD COMPLEXITY 2 3/16/2021 Rayo Vallejo, OT GO 1    77691368229 HC OT SELF CARE/MGMT/TRAIN EA 15 MIN 3/16/2021 Rayo Vallejo OT GO 2               Rayo Vallejo, OT  3/16/2021

## 2021-03-16 NOTE — PLAN OF CARE
Problem: Restraint, Nonbehavioral (Nonviolent)  Goal: Discontinuation Criteria Achieved  Outcome: Ongoing, Progressing  Intervention: Implement Least-restrictive Safety Strategies  Recent Flowsheet Documentation  Taken 3/16/2021 0000 by Magaly Werner RN  Diversional Activities: television  Taken 3/15/2021 1957 by Magaly Werner RN  Diversional Activities: television  Goal: Personal Dignity and Safety Maintained  Outcome: Ongoing, Progressing  Intervention: Protect Dignity, Rights, and Personal Wellbeing  Recent Flowsheet Documentation  Taken 3/16/2021 0000 by Magaly Werner RN  Trust Relationship/Rapport:   care explained   choices provided  Taken 3/15/2021 1957 by Magaly Werner RN  Trust Relationship/Rapport:   care explained   choices provided   emotional support provided   questions answered   questions encouraged  Intervention: Protect Skin and Joint Integrity  Recent Flowsheet Documentation  Taken 3/16/2021 0400 by Magaly Werner RN  Body Position:   position maintained   supine, legs elevated  Taken 3/16/2021 0200 by Magaly Werner RN  Body Position:   position maintained   tilted, left  Taken 3/16/2021 0120 by Magaly Werner RN  Body Position:   turned   tilted, left  Taken 3/16/2021 0000 by Magaly Werner RN  Body Position:   weight shift assistance provided   supine, legs elevated  Taken 3/15/2021 2201 by Magaly Werner RN  Body Position:   turned   tilted, left  Taken 3/15/2021 1957 by Magaly Werner RN  Body Position:   weight shift assistance provided   tilted, right   Goal Outcome Evaluation:

## 2021-03-16 NOTE — PLAN OF CARE
Goal Outcome Evaluation:  Plan of Care Reviewed With: (P) patient  Progress: (P) improving  Outcome Summary: (P) pt tolerated treatment well today. pt was CGA for STS transfers. pt ambulated 400ft with no AD and CGA/[place]. pt was unsteady and easily distracted during ambulation. he had some minor LOB but was able to self correct. pt performed B LE exercises. continue to progress pt as tolerated.  Pt wore mask. Therapist wore standard mask and proper PPE. Pt not an enhanced droplet precaution.

## 2021-03-16 NOTE — THERAPY TREATMENT NOTE
Acute Care - Speech Language Pathology Initial Evaluation  [facility]     Patient Name: Jose R Dalton  : 1960  MRN: [id number]  Today's Date: 3/16/2021               Admit Date: 3/7/2021     Visit Dx:    ICD-10-CM ICD-9-CM   1. Cerebrovascular accident (CVA), unspecified mechanism (CMS/HCC)  I63.9 434.91     Patient Active Problem List   Diagnosis   • Cerebrovascular accident (CVA) (CMS/HCC)        SLP EVALUATION (last 72 hours)      SLP SLC Evaluation     Row Name 21 1400       Communication Assessment/Intervention    Document Type  evaluation  -AB    Subjective Information  no complaints  -AB    Patient Observations  alert;cooperative;agree to therapy  -AB    Patient/Family/Caregiver Comments/Observations  Pt remains in Posey vest, vocal quality mildly hoarse, however he is cooperative and follows all directions from SLP.  -AB    Care Plan Review  evaluation/treatment results reviewed  -AB    Patient Effort  good  -AB    Symptoms Noted During/After Treatment  none  -AB       General Information    Patient Profile Reviewed  yes  -AB    Pertinent History Of Current Problem  LMCA CVA w/intubation 3/5-3/12, w/bronchoscopy during that time suggestive of aspiration pna. PMH RMCA CVA  -AB    Precautions/Limitations, Vision  WFL;for purposes of eval  -AB    Precautions/Limitations, Hearing  WFL;for purposes of eval  -AB    Prior Level of Function-Communication  WFL  -AB    Plans/Goals Discussed with  patient;other (see comments) nursing staff  -AB    Barriers to Rehab  medically complex  -AB    Patient's Goals for Discharge  return to home  -AB    Standardized Assessment Used  FROMAJE  -AB       Pain    Additional Documentation  Pain Scale: Numbers Pre/Post-Treatment (Group)  -AB       Pain Scale: Numbers Pre/Post-Treatment    Pretreatment Pain Rating  0/10 - no pain  -AB    Posttreatment Pain Rating  0/10 - no pain  -AB       Comprehension Assessment/Intervention    Comprehension Assessment/Intervention   "Auditory Comprehension  -AB       Auditory Comprehension Assessment/Intervention    Auditory Comprehension (Communication)  WFL  -AB    Able to Identify Objects/Pictures (Communication)  WFL  -AB    Answers Questions (Communication)  WFL;personal;wh questions;yes/no  -AB    Able to Follow Commands (Communication)  WFL;1-step;2-step  -AB       Expression Assessment/Intervention    Expression Assessment/Intervention  verbal expression  -AB       Verbal Expression Assessment/Intervention    Verbal Expression  mild impairment  -AB    Automatic Speech (Communication)  WFL  -AB    Repetition  WFL;words;phrases  -AB    Phrase Completion  WFL;automatic/predictable  -AB    Sentence Formulation  mild impairment  -AB    Conversational Discourse/Fluency  mild impairment  -AB       Cognitive Assessment Intervention- SLP    Cognitive Function (Cognition)  moderate impairment  -AB    Cognition, Comment  Participates in FROMAJE (Function, Reason, Orientation, Memory, Attention, Judgement, Emotional state) obtaining a score of 16, indicative of severe dementia/depression. Of note, pt primarily obtains scores of 2/3 with 1-WFL, 3-severe impairment indicative of mild-moderate impairments across the aforementioned areas. Exception remains, ongoing severe impairments in reasoning/judgement which likely impact safety. Pt reports intermittent word finding difficulties, \"It's hard to get my words out,\" and responds well to SLP language tasks such as automatic associations and naming. Recommend speech language/low level cognition intervention as able, continued at next level of care.  -AB       Standardized Tests    Cognitive/Memory Tests  FROMAJE: Function, Reason, Orientation, Memory, Arithmetic, Judgement, and Emotional Status  -AB       FROMJE- Function, Reason, Orientation, Memory, Arithmetic, Judgement, and Emotional Status    Function Score  2  -AB    Reason Score  3  -AB    Orientation Score  2  -AB    Memory Score  2  -AB    " Arithmetic Score  2  -AB    Judgment Score  3  -AB    Emotional Status Score  2  -AB    FROMAJE Total Score  16  -AB    FROMAJE Rating  13 or >: Severe dementia or depression  -AB       SLP Clinical Impressions    SLP Diagnosis  Moderate cognitive linguistic impairment  -AB    Rehab Potential/Prognosis  fair  -AB    SLC Criteria for Skilled Therapy Interventions Met  yes  -AB    Functional Impact  unable to care for self;difficulty in expressing complex messages;restrictions in personal and social life;decreased ability to respond to situations safely  -AB       Recommendations    Therapy Frequency (SLP SLC)  PRN  -AB    Predicted Duration Therapy Intervention (Days)  until discharge  -AB    Anticipated Discharge Disposition (SLP)  unknown;anticipate therapy at next level of care  -AB       Communication Treatment Objective and Progress Goals (SLP)    Verbal Expression Treatment Objectives  Verbal Expression Treatment Objectives (Group)  -AB    Cognitive Linguistic Treatment Objectives  Cognitive Linguistic Treatment Objectives (Group)  -AB       Verbal Expression Treatment Objectives    Word Retrieval Skills Selection  word retrieval, SLP goal 1  -AB       Word Retrieval Skills Goal 1 (SLP)    Improve Word Retrieval Skills By Goal 1 (SLP)  completing functional word finding tasks;70%;with moderate cues (50-74%)  -AB    Time Frame (Word Retrieval Goal 1, SLP)  short term goal (STG)  -AB       Cognitive Linguistic Treatment Objectives    Attention Selection  attention, SLP goal 1  -AB    Orientation Selection  orientation, SLP goal 1  -AB    Memory Skills Selection  memory skills, SLP goal 1  -AB    Problem Solving Selection  problem solving, SLP goal 1  -AB       Attention Goal 1 (SLP)    Improve Attention by Goal 1 (SLP)  complete selective attention task;complete sustained attention task;70%;with moderate cues (50-74%)  -AB    Time Frame (Attention Goal 1, SLP)  short term goal (STG)  -AB       Orientation Goal 1  (SLP)    Improve Orientation Through Goal 1 (SLP)  use environmental aids to assist with orientation;demonstrating orientation to disease/impairment;demonstrating orientation to place;demonstrating orientation to year;demonstrating orientation to month;demonstrating orientation to day;80%;with minimal cues (75-90%)  -AB    Time Frame (Orientation Goal 1, SLP)  short term goal (STG)  -AB       Memory Skills Goal 1 (SLP)    Improve Memory Skills Through Goal 1 (SLP)  use memory strategies;use external memory aid;80%;with moderate cues (50-74%)  -AB    Time Frame (Memory Skills Goal 1, SLP)  short term goal (STG)  -AB       Functional Problem Solving Skills Goal 1 (SLP)    Improve Problem Solving Through Goal 1 (SLP)  determine solutions to simple ADL/safety problems;answer questions about ADL problems;80%;with moderate cues (50-74%)  -AB    Time Frame (Problem Solving Goal 1, SLP)  short term goal (STG)  -AB      User Key  (r) = Recorded By, (t) = Taken By, (c) = Cosigned By    Initials Name Effective Dates    AB Melissa Segura, MS CCC-SLP 10/05/20 -              EDUCATION  The patient has been educated in the following areas:     Cognitive Impairment Communication Impairment Dysphagia (Swallowing Impairment) Modified Diet Instruction.    SLP Recommendation and Plan  Recommend speech language/low level cognition intervention as able, continued at next level of care.      SLP GOALS     Row Name 03/16/21 1400       Word Retrieval Skills Goal 1 (SLP)    Improve Word Retrieval Skills By Goal 1 (SLP)  completing functional word finding tasks;70%;with moderate cues (50-74%)  -AB    Time Frame (Word Retrieval Goal 1, SLP)  short term goal (STG)  -AB       Attention Goal 1 (SLP)    Improve Attention by Goal 1 (SLP)  complete selective attention task;complete sustained attention task;70%;with moderate cues (50-74%)  -AB    Time Frame (Attention Goal 1, SLP)  short term goal (STG)  -AB       Orientation Goal 1 (SLP)     Improve Orientation Through Goal 1 (SLP)  use environmental aids to assist with orientation;demonstrating orientation to disease/impairment;demonstrating orientation to place;demonstrating orientation to year;demonstrating orientation to month;demonstrating orientation to day;80%;with minimal cues (75-90%)  -AB    Time Frame (Orientation Goal 1, SLP)  short term goal (STG)  -AB       Memory Skills Goal 1 (SLP)    Improve Memory Skills Through Goal 1 (SLP)  use memory strategies;use external memory aid;80%;with moderate cues (50-74%)  -AB    Time Frame (Memory Skills Goal 1, SLP)  short term goal (STG)  -AB       Functional Problem Solving Skills Goal 1 (SLP)    Improve Problem Solving Through Goal 1 (SLP)  determine solutions to simple ADL/safety problems;answer questions about ADL problems;80%;with moderate cues (50-74%)  -AB    Time Frame (Problem Solving Goal 1, SLP)  short term goal (STG)  -AB      User Key  (r) = Recorded By, (t) = Taken By, (c) = Cosigned By    Initials Name Provider Type    Melissa Asher MS CCC-SLP Speech and Language Pathologist               Melissa Segura MS CCC-SLP  3/16/2021        Acute Care - Speech Language Pathology   Swallow Re-Evaluation [facility]     Patient Name: Jose R Dalton  : 1960  MRN: [id number]  Today's Date: 3/16/2021               Admit Date: 3/7/2021    Visit Dx:     ICD-10-CM ICD-9-CM   1. Cerebrovascular accident (CVA), unspecified mechanism (CMS/[facility])  I63.9 434.91     Patient Active Problem List   Diagnosis   • Cerebrovascular accident (CVA) (CMS/[facility])       EDUCATION  The patient has been educated in the following areas:   Cognitive Impairment Communication Impairment Dysphagia (Swallowing Impairment) Modified Diet Instruction.    SLP Recommendation and Plan  Recommend: thin liquid with fork mashed solids. Medications: 1 at a time with thin liquids. Compensations: small bites/sips, single sips, upright for all PO. For cognition: Participates in  "FROMAJE (Function, Reason, Orientation, Memory, Attention, Judgement, Emotional state) obtaining a score of 16, indicative of severe dementia/depression. Of note, pt primarily obtains scores of 2/3 with 1-WFL, 3-severe impairment indicative of mild-moderate impairments across the aforementioned areas. Exception remains, ongoing severe impairments in reasoning/judgement which likely impact safety. Pt reports intermittent word finding difficulties, \"It's hard to get my words out,\" and responds well to SLP language tasks such as automatic associations and naming. Recommend speech language/low level cognition intervention as able, continued at next level of care.    SLP GOALS     Row Name 03/16/21 1400       Oral Nutrition/Hydration Goal 1 (SLP)    Oral Nutrition/Hydration Goal 1, SLP  Tolerate L/R diet without oral stage difficulties or complications associated with aspiration and MIN cues for compensations  -AB    Time Frame (Oral Nutrition/Hydration Goal 1, SLP)  by discharge  -AB    Barriers (Oral Nutrition/Hydration Goal 1, SLP)  Oral phase mild to moderately impaired. Dentures remain absent, and mastication is extended, able to clear for x3 trials. Appropriate manipulation of ice chips and transport of liquids (nectar and thins) and puree. Pharyngeal phase with absence of s/s aspiration, digital palpation suggests timely initiation and x1 swallow per bolus. For multiple, large, sequential sips, pt exhibits throat clear with delayed (1-2 min postprandial) wet cough. He responds to cued smaller sips provided by SLP with no additional difficulties across consistencies. Recommend: thin liquid with fork mashed solids. Medications: 1 at a time with thin liquids. Compensations: small bites/sips, single sips, upright for all PO. Pt may benefit from instrumental, dependent on clinical course and ongoing continued improvements in mental status. Will plan follow up/re-evaluation within next 1-2 days.   -AB    " Progress/Outcomes (Oral Nutrition/Hydration Goal 1, SLP)  good progress toward goal;goal ongoing  -AB                     User Key  (r) = Recorded By, (t) = Taken By, (c) = Cosigned By    Initials Name Provider Type    Melissa Asher MS CCC-SLP Speech and Language Pathologist          Time Calculation:   Time Calculation- SLP     Row Name 03/16/21 1453             Time Calculation- SLP    SLP Start Time  0900  -AB      SLP Received On  03/16/21  -AB        User Key  (r) = Recorded By, (t) = Taken By, (c) = Cosigned By    Initials Name Provider Type    Melissa Asher MS CCC-SLP Speech and Language Pathologist          Therapy Charges for Today     Code Description Service Date Service Provider Modifiers Qty    72992746952 HC ST EVAL SPEECH AND PROD W LANG  4 3/16/2021 Melissa Segura MS CCC-SLP GN 1    [id number] HC ST TREATMENT SWALLOW 3 3/16/2021 Melissa Segura MS CCC-SLP GN 1              PPE:  Patient was not wearing a face mask during this therapy encounter. Therapist used appropriate personal protective equipment including mask, eye protection and gloves.  Mask used was standard procedure mask. Appropriate PPE was worn during the entire therapy session. The patient coughed during this evaluation. Therapist was within 6 feet for 15 minutes or more during the evaluation. Hand hygiene was completed before and after therapy session. Patient is not in enhanced droplet precautions.         MS ERROL Diaz  3/16/2021

## 2021-03-16 NOTE — PLAN OF CARE
"Goal Outcome Evaluation:  Plan of Care Reviewed With: patient  Progress: no change  Outcome Summary: Pt seen for re-evaluation of swallow and brief cognitive linguistic screener d/t mental status improving and LMCA CVA presence. Pt remains in Posey vest, vocal quality mildly hoarse, however he is cooperative and follows all directions from SLP. For swallow: Oral phase mild to moderately impaired. Dentures remain absent, and mastication is extended, able to clear for x3 trials. Appropriate manipulation of ice chips and transport of liquids (nectar and thins) and puree. Pharyngeal phase with absence of s/s aspiration, digital palpation suggests timely initiation and x1 swallow per bolus. For multiple, large, sequential sips, pt exhibits throat clear with delayed (1-2 min postprandial) wet cough. He responds to cued smaller sips provided by SLP with no additional difficulties across consistencies.     Recommend: thin liquid with fork mashed solids. Medications: 1 at a time with thin liquids. Compensations: small bites/sips, single sips, upright for all PO.   Pt may benefit from instrumental, dependent on clinical course and ongoing continued improvements in mental status. Will plan follow up/re-evaluation within next 1-2 days.     For cognition: Participates in FROMAJE (Function, Reason, Orientation, Memory, Attention, Judgement, Emotional state) obtaining a score of 16, indicative of severe dementia/depression. Of note, pt primarily obtains scores of 2/3 with 1-WFL, 3-severe impairment indicative of mild-moderate impairments across the aforementioned areas. Exception remains, ongoing severe impairments in reasoning/judgement which likely impact safety. Pt reports intermittent word finding difficulties, \"It's hard to get my words out,\" and responds well to SLP language tasks such as automatic associations and naming.     Recommend speech language/low level cognition intervention as able, continued at next level of " care.

## 2021-03-17 ENCOUNTER — TELEPHONE (OUTPATIENT)
Dept: NEUROSURGERY | Facility: CLINIC | Age: 61
End: 2021-03-17

## 2021-03-17 DIAGNOSIS — I63.232 CEREBROVASCULAR ACCIDENT (CVA) DUE TO OCCLUSION OF LEFT CAROTID ARTERY (HCC): Primary | ICD-10-CM

## 2021-03-17 LAB
ALBUMIN SERPL-MCNC: 3.8 G/DL (ref 3.5–5.2)
ANION GAP SERPL CALCULATED.3IONS-SCNC: 11.1 MMOL/L (ref 5–15)
BUN SERPL-MCNC: 28 MG/DL (ref 6–20)
BUN/CREAT SERPL: 21.7 (ref 7–25)
CALCIUM SPEC-SCNC: 8.6 MG/DL (ref 8.6–10.5)
CHLORIDE SERPL-SCNC: 111 MMOL/L (ref 98–107)
CO2 SERPL-SCNC: 24.9 MMOL/L (ref 22–29)
CREAT SERPL-MCNC: 1.29 MG/DL (ref 0.76–1.27)
GFR SERPL CREATININE-BSD FRML MDRD: 69 ML/MIN/1.73
GLUCOSE SERPL-MCNC: 131 MG/DL (ref 65–99)
PHOSPHATE SERPL-MCNC: 3.2 MG/DL (ref 2.5–4.5)
POTASSIUM SERPL-SCNC: 3.4 MMOL/L (ref 3.5–5.2)
POTASSIUM SERPL-SCNC: 3.7 MMOL/L (ref 3.5–5.2)
SODIUM SERPL-SCNC: 147 MMOL/L (ref 136–145)

## 2021-03-17 PROCEDURE — 80069 RENAL FUNCTION PANEL: CPT | Performed by: INTERNAL MEDICINE

## 2021-03-17 PROCEDURE — 84132 ASSAY OF SERUM POTASSIUM: CPT | Performed by: INTERNAL MEDICINE

## 2021-03-17 PROCEDURE — 97110 THERAPEUTIC EXERCISES: CPT

## 2021-03-17 PROCEDURE — 97535 SELF CARE MNGMENT TRAINING: CPT

## 2021-03-17 PROCEDURE — 97116 GAIT TRAINING THERAPY: CPT

## 2021-03-17 RX ORDER — POTASSIUM CHLORIDE 750 MG/1
40 TABLET, FILM COATED, EXTENDED RELEASE ORAL AS NEEDED
Status: DISCONTINUED | OUTPATIENT
Start: 2021-03-17 | End: 2021-03-19 | Stop reason: HOSPADM

## 2021-03-17 RX ORDER — POTASSIUM CHLORIDE 1.5 G/1.77G
40 POWDER, FOR SOLUTION ORAL AS NEEDED
Status: DISCONTINUED | OUTPATIENT
Start: 2021-03-17 | End: 2021-03-19 | Stop reason: HOSPADM

## 2021-03-17 RX ADMIN — TICAGRELOR 90 MG: 90 TABLET ORAL at 08:50

## 2021-03-17 RX ADMIN — ASPIRIN 81 MG: 81 TABLET, CHEWABLE ORAL at 08:50

## 2021-03-17 RX ADMIN — CLONIDINE HYDROCHLORIDE 0.3 MG: 0.1 TABLET ORAL at 22:24

## 2021-03-17 RX ADMIN — QUETIAPINE FUMARATE 50 MG: 50 TABLET, FILM COATED ORAL at 22:24

## 2021-03-17 RX ADMIN — HYDRALAZINE HYDROCHLORIDE 25 MG: 25 TABLET, FILM COATED ORAL at 13:24

## 2021-03-17 RX ADMIN — CLONIDINE HYDROCHLORIDE 0.3 MG: 0.1 TABLET ORAL at 05:41

## 2021-03-17 RX ADMIN — QUETIAPINE FUMARATE 50 MG: 50 TABLET, FILM COATED ORAL at 08:50

## 2021-03-17 RX ADMIN — HYDRALAZINE HYDROCHLORIDE 25 MG: 25 TABLET, FILM COATED ORAL at 05:41

## 2021-03-17 RX ADMIN — CLONIDINE HYDROCHLORIDE 0.3 MG: 0.1 TABLET ORAL at 13:24

## 2021-03-17 RX ADMIN — ATORVASTATIN CALCIUM 80 MG: 80 TABLET, FILM COATED ORAL at 22:23

## 2021-03-17 RX ADMIN — Medication 1 PATCH: at 08:51

## 2021-03-17 RX ADMIN — AMLODIPINE BESYLATE 10 MG: 10 TABLET ORAL at 08:50

## 2021-03-17 RX ADMIN — POTASSIUM CHLORIDE 40 MEQ: 750 TABLET, EXTENDED RELEASE ORAL at 13:24

## 2021-03-17 RX ADMIN — HYDRALAZINE HYDROCHLORIDE 25 MG: 25 TABLET, FILM COATED ORAL at 22:24

## 2021-03-17 NOTE — TELEPHONE ENCOUNTER
----- Message from GUERA Garcia sent at 3/12/2021  3:44 PM EST -----  Please schedule a f/up with Dr. Washington in one month with a carotid us, at that time.  Thank you!

## 2021-03-17 NOTE — PLAN OF CARE
Goal Outcome Evaluation:  Plan of Care Reviewed With: patient  Progress: improving     Pt A+O x 4 (sometimes forgetful). BP under parameters & VSS. No need for PRN meds for BP or Ativan. Pt has had a great night, so far. He has been cooperative, pleasant, and compliant with keeping SCDs on, keeping telemetry leads on, and using call light. So far, he has been restraint free this shift and resting comfortably. Pt denies pain. Tolerates diet well. Daughter, Harsh, called for update and spoke with pt. Pt's sister, Naty, visited pt last night and pt enjoyed. NIHSS 3. Right groin puncture site MIKY and intact. Education given about stroke care and prevention; booklet in room. Hopefully, DC plans soon to [facility]. I will continue to monitor and progress towards goals as tolerated.

## 2021-03-17 NOTE — PLAN OF CARE
Problem: Adult Inpatient Plan of Care  Goal: Plan of Care Review  Outcome: Ongoing, Progressing  Goal: Patient-Specific Goal (Individualized)  Outcome: Ongoing, Progressing  Goal: Absence of Hospital-Acquired Illness or Injury  Outcome: Ongoing, Progressing  Intervention: Identify and Manage Fall Risk  Recent Flowsheet Documentation  Taken 3/17/2021 1824 by Arjun De La Torre RN  Safety Promotion/Fall Prevention:   assistive device/personal items within reach   activity supervised   clutter free environment maintained   elopement precautions   fall prevention program maintained   gait belt   lighting adjusted   mobility aid in reach   muscle strengthening facilitated   nonskid shoes/slippers when out of bed   room organization consistent   safety round/check completed   toileting scheduled  Taken 3/17/2021 1609 by Arjun De La Torre RN  Safety Promotion/Fall Prevention:   activity supervised   assistive device/personal items within reach   clutter free environment maintained   elopement precautions   fall prevention program maintained   gait belt   lighting adjusted   mobility aid in reach   muscle strengthening facilitated   nonskid shoes/slippers when out of bed   room organization consistent   safety round/check completed   toileting scheduled  Taken 3/17/2021 1430 by Arjun De La Torre RN  Safety Promotion/Fall Prevention:   activity supervised   assistive device/personal items within reach   clutter free environment maintained   elopement precautions   fall prevention program maintained   gait belt   lighting adjusted   mobility aid in reach   muscle strengthening facilitated   nonskid shoes/slippers when out of bed   room organization consistent   safety round/check completed   toileting scheduled  Taken 3/17/2021 1210 by Arjun De La Torre RN  Safety Promotion/Fall Prevention:   activity supervised   assistive device/personal items within reach   clutter free environment maintained   elopement precautions   fall  prevention program maintained   gait belt   lighting adjusted   mobility aid in reach   muscle strengthening facilitated   nonskid shoes/slippers when out of bed   room organization consistent   safety round/check completed   toileting scheduled  Taken 3/17/2021 1025 by Arjun De La Torre RN  Safety Promotion/Fall Prevention:   activity supervised   assistive device/personal items within reach   clutter free environment maintained   gait belt   elopement precautions   fall prevention program maintained   lighting adjusted   mobility aid in reach   muscle strengthening facilitated   nonskid shoes/slippers when out of bed   room organization consistent   safety round/check completed   toileting scheduled  Taken 3/17/2021 0838 by Arjun De La Torre RN  Safety Promotion/Fall Prevention:   activity supervised   assistive device/personal items within reach   clutter free environment maintained   elopement precautions   fall prevention program maintained   gait belt   lighting adjusted   mobility aid in reach   muscle strengthening facilitated   nonskid shoes/slippers when out of bed   room organization consistent   safety round/check completed   toileting scheduled  Intervention: Prevent Skin Injury  Recent Flowsheet Documentation  Taken 3/17/2021 1824 by Arjun De La Torre RN  Body Position: position changed independently  Taken 3/17/2021 1609 by Arjun De La Torre RN  Body Position: position changed independently  Taken 3/17/2021 1430 by Arjun De La Torre RN  Body Position: position changed independently  Taken 3/17/2021 1210 by Arjun De La Torre RN  Body Position: position changed independently  Taken 3/17/2021 1025 by Arjun De La Torre RN  Body Position: position maintained  Taken 3/17/2021 0838 by Arjun De La Torre RN  Body Position: position changed independently  Intervention: Prevent and Manage VTE (venous thromboembolism) Risk  Recent Flowsheet Documentation  Taken 3/17/2021 1824 by Arjun De La Torre RN  VTE Prevention/Management:    bilateral   sequential compression devices on  Taken 3/17/2021 1609 by Arjun De La Torre RN  VTE Prevention/Management:   bilateral   sequential compression devices on  Taken 3/17/2021 1430 by Arjun De La Torre RN  VTE Prevention/Management:   bilateral   sequential compression devices on  Taken 3/17/2021 1210 by Arjun De La Torre RN  VTE Prevention/Management:   bilateral   sequential compression devices on  Taken 3/17/2021 1025 by Arjun De La Torre RN  VTE Prevention/Management:   bilateral   sequential compression devices on  Taken 3/17/2021 0838 by Arjun De La Torre RN  VTE Prevention/Management:   bilateral   sequential compression devices on  Intervention: Prevent Infection  Recent Flowsheet Documentation  Taken 3/17/2021 1824 by Arjun De La Torre RN  Infection Prevention:   cohorting utilized   environmental surveillance performed   hand hygiene promoted   equipment surfaces disinfected   personal protective equipment utilized   rest/sleep promoted   single patient room provided   visitors restricted/screened  Taken 3/17/2021 1609 by Arjun De La Torre RN  Infection Prevention:   cohorting utilized   equipment surfaces disinfected   hand hygiene promoted   environmental surveillance performed   single patient room provided   visitors restricted/screened   rest/sleep promoted   personal protective equipment utilized  Taken 3/17/2021 1430 by Arjun De La Torre RN  Infection Prevention:   cohorting utilized   environmental surveillance performed   equipment surfaces disinfected   hand hygiene promoted   single patient room provided   rest/sleep promoted   personal protective equipment utilized   visitors restricted/screened  Taken 3/17/2021 1210 by Arjun De La Torre RN  Infection Prevention:   cohorting utilized   environmental surveillance performed   equipment surfaces disinfected   hand hygiene promoted   personal protective equipment utilized   rest/sleep promoted   single patient room provided   visitors  restricted/screened  Taken 3/17/2021 1025 by Arjun De La Torre RN  Infection Prevention:   cohorting utilized   environmental surveillance performed   equipment surfaces disinfected   rest/sleep promoted   hand hygiene promoted   personal protective equipment utilized   visitors restricted/screened   single patient room provided  Taken 3/17/2021 0838 by Arjun De La Torre RN  Infection Prevention:   cohorting utilized   environmental surveillance performed   equipment surfaces disinfected   hand hygiene promoted   personal protective equipment utilized   rest/sleep promoted   single patient room provided   visitors restricted/screened  Goal: Optimal Comfort and Wellbeing  Outcome: Ongoing, Progressing  Goal: Readiness for Transition of Care  Outcome: Ongoing, Progressing     Problem: Skin Injury Risk Increased  Goal: Skin Health and Integrity  Outcome: Ongoing, Progressing  Intervention: Optimize Skin Protection  Recent Flowsheet Documentation  Taken 3/17/2021 1824 by Arjun De La Torre RN  Head of Bed (HOB): HOB at 30-45 degrees  Taken 3/17/2021 1609 by Arjun De La Torre RN  Head of Bed (HOB): HOB at 30-45 degrees  Taken 3/17/2021 1430 by Arjun De La Torre RN  Head of Bed (HOB): HOB at 20-30 degrees  Taken 3/17/2021 1210 by Arjun De La Torre RN  Head of Bed (HOB): HOB at 30-45 degrees  Taken 3/17/2021 1025 by Arjun De La Torre RN  Head of Bed (HOB): HOB at 30-45 degrees  Taken 3/17/2021 0838 by Arjun De La Torre RN  Head of Bed (HOB): HOB at 20-30 degrees  Pressure Reduction Devices: alternating pressure pump (ADD)     Problem: Fall Injury Risk  Goal: Absence of Fall and Fall-Related Injury  Outcome: Ongoing, Progressing  Intervention: Identify and Manage Contributors to Fall Injury Risk  Recent Flowsheet Documentation  Taken 3/17/2021 1824 by Arjun De La Torre RN  Medication Review/Management: medications reviewed  Taken 3/17/2021 1609 by Arjun De La Torre RN  Medication Review/Management: medications reviewed  Taken 3/17/2021 1430 by  Arjun De La Torre RN  Medication Review/Management: medications reviewed  Taken 3/17/2021 1210 by Arjun De La Torre RN  Medication Review/Management: medications reviewed  Taken 3/17/2021 1025 by Arjun De La Torre RN  Medication Review/Management: medications reviewed  Taken 3/17/2021 0838 by Arjun De La Torre RN  Medication Review/Management: medications reviewed  Intervention: Promote Injury-Free Environment  Recent Flowsheet Documentation  Taken 3/17/2021 1824 by Arjun De La Torre RN  Safety Promotion/Fall Prevention:   assistive device/personal items within reach   activity supervised   clutter free environment maintained   elopement precautions   fall prevention program maintained   gait belt   lighting adjusted   mobility aid in reach   muscle strengthening facilitated   nonskid shoes/slippers when out of bed   room organization consistent   safety round/check completed   toileting scheduled  Taken 3/17/2021 1609 by Arjun De La Torre RN  Safety Promotion/Fall Prevention:   activity supervised   assistive device/personal items within reach   clutter free environment maintained   elopement precautions   fall prevention program maintained   gait belt   lighting adjusted   mobility aid in reach   muscle strengthening facilitated   nonskid shoes/slippers when out of bed   room organization consistent   safety round/check completed   toileting scheduled  Taken 3/17/2021 1430 by Arjun De La Torre RN  Safety Promotion/Fall Prevention:   activity supervised   assistive device/personal items within reach   clutter free environment maintained   elopement precautions   fall prevention program maintained   gait belt   lighting adjusted   mobility aid in reach   muscle strengthening facilitated   nonskid shoes/slippers when out of bed   room organization consistent   safety round/check completed   toileting scheduled  Taken 3/17/2021 1210 by Arjun De La Torre RN  Safety Promotion/Fall Prevention:   activity supervised   assistive  device/personal items within reach   clutter free environment maintained   elopement precautions   fall prevention program maintained   gait belt   lighting adjusted   mobility aid in reach   muscle strengthening facilitated   nonskid shoes/slippers when out of bed   room organization consistent   safety round/check completed   toileting scheduled  Taken 3/17/2021 1025 by Arjun De La Torre RN  Safety Promotion/Fall Prevention:   activity supervised   assistive device/personal items within reach   clutter free environment maintained   gait belt   elopement precautions   fall prevention program maintained   lighting adjusted   mobility aid in reach   muscle strengthening facilitated   nonskid shoes/slippers when out of bed   room organization consistent   safety round/check completed   toileting scheduled  Taken 3/17/2021 0838 by Arjun De La Torre RN  Safety Promotion/Fall Prevention:   activity supervised   assistive device/personal items within reach   clutter free environment maintained   elopement precautions   fall prevention program maintained   gait belt   lighting adjusted   mobility aid in reach   muscle strengthening facilitated   nonskid shoes/slippers when out of bed   room organization consistent   safety round/check completed   toileting scheduled     Problem: Adjustment to Illness (Stroke, Ischemic/Transient Ischemic Attack)  Goal: Optimal Coping  Outcome: Ongoing, Progressing     Problem: Bowel Elimination Impaired (Stroke, Ischemic/Transient Ischemic Attack)  Goal: Effective Bowel Elimination  Outcome: Ongoing, Progressing     Problem: Cerebral Tissue Perfusion Risk (Stroke, Ischemic/Transient Ischemic Attack)  Goal: Optimal Cerebral Tissue Perfusion  Outcome: Ongoing, Progressing  Intervention: Optimize Oxygenation and Ventilation  Recent Flowsheet Documentation  Taken 3/17/2021 1824 by Arjun De La Torre RN  Head of Bed (HOB): HOB at 30-45 degrees  Taken 3/17/2021 1609 by Arjun De La Torre RN  Head of Bed  (HOB): HOB at 30-45 degrees  Taken 3/17/2021 1430 by Arjun De La Torre RN  Head of Bed (HOB): HOB at 20-30 degrees  Taken 3/17/2021 1210 by Arjun De La Torre RN  Head of Bed (HOB): HOB at 30-45 degrees  Taken 3/17/2021 1025 by Arjun De La Torre RN  Head of Bed (HOB): HOB at 30-45 degrees  Taken 3/17/2021 0838 by Arjun De La Torre RN  Head of Bed (HOB): HOB at 20-30 degrees     Problem: Communication Impairment (Stroke, Ischemic/Transient Ischemic Attack)  Goal: Improved Communication Skills  Outcome: Ongoing, Progressing     Problem: Eating/Swallowing Impairment (Stroke, Ischemic/Transient Ischemic Attack)  Goal: Oral Intake without Aspiration  Outcome: Ongoing, Progressing     Problem: Functional Ability Impaired (Stroke, Ischemic/Transient Ischemic Attack)  Goal: Optimal Functional Ability  Outcome: Ongoing, Progressing  Intervention: Optimize Functional Ability  Recent Flowsheet Documentation  Taken 3/17/2021 1824 by Arjun De La Torre RN  Activity Management: activity adjusted per tolerance  Taken 3/17/2021 1609 by Arjun De La Torre RN  Activity Management: activity adjusted per tolerance  Taken 3/17/2021 1430 by Arjun De La Torre RN  Activity Management: activity adjusted per tolerance  Taken 3/17/2021 1210 by Arjun De La Torre RN  Activity Management: activity adjusted per tolerance  Taken 3/17/2021 1025 by Arjun De La Torre RN  Activity Management: activity adjusted per tolerance  Taken 3/17/2021 0838 by Arjun De La Torre RN  Activity Management: activity adjusted per tolerance     Problem: Hemodynamic Instability (Stroke, Ischemic/Transient Ischemic Attack)  Goal: Vital Signs Remain in Desired Range  Outcome: Ongoing, Progressing     Problem: Pain (Stroke, Ischemic/Transient Ischemic Attack)  Goal: Acceptable Pain Control  Outcome: Ongoing, Progressing     Problem: Sensorimotor Impairment (Stroke, Ischemic/Transient Ischemic Attack)  Goal: Improved Sensorimotor Function  Outcome: Ongoing, Progressing  Intervention: Optimize  Range of Motion, Motor Control and Function  Recent Flowsheet Documentation  Taken 3/17/2021 1824 by Arjun De La Torre RN  Positioning/Transfer Devices:   pillows   in use  Taken 3/17/2021 1609 by Arjun De La Torre RN  Positioning/Transfer Devices:   pillows   in use  Taken 3/17/2021 1430 by Arjun De La Torre RN  Positioning/Transfer Devices:   pillows   in use  Taken 3/17/2021 1210 by Arjun De La Torre RN  Positioning/Transfer Devices:   pillows   in use  Taken 3/17/2021 1025 by Arjun De La Torre RN  Positioning/Transfer Devices:   pillows   in use  Taken 3/17/2021 0838 by Arjun De La Torre RN  Positioning/Transfer Devices:   pillows   in use  Intervention: Optimize Sensory and Perceptual Abilities  Recent Flowsheet Documentation  Taken 3/17/2021 0838 by Arjun De La Torre RN  Pressure Reduction Devices: alternating pressure pump (ADD)     Problem: Urinary Elimination Impaired (Stroke, Ischemic/Transient Ischemic Attack)  Goal: Effective Urinary Elimination  Outcome: Ongoing, Progressing     Problem: Communication Impairment (Mechanical Ventilation, Invasive)  Goal: Effective Communication  Outcome: Ongoing, Progressing     Problem: Device-Related Complication Risk (Mechanical Ventilation, Invasive)  Goal: Optimal Device Function  Outcome: Ongoing, Progressing  Intervention: Optimize Device Care and Function  Recent Flowsheet Documentation  Taken 3/17/2021 1824 by Arjun De La Torre RN  Airway Safety Measures: mask at bedside  Taken 3/17/2021 1609 by Arjun De La Torre RN  Airway Safety Measures: mask at bedside  Taken 3/17/2021 1430 by Arjun De La Torre RN  Airway Safety Measures: mask at bedside  Taken 3/17/2021 1210 by Arjun De La Torre RN  Airway Safety Measures: mask at bedside  Taken 3/17/2021 1025 by Arjun De La Torre RN  Airway Safety Measures: mask at bedside  Taken 3/17/2021 0838 by Arjun De La Torre RN  Airway Safety Measures: mask at bedside     Problem: Nutrition Impairment (Mechanical Ventilation, Invasive)  Goal: Optimal  Nutrition Delivery  Outcome: Ongoing, Progressing     Problem: Skin and Tissue Injury (Mechanical Ventilation, Invasive)  Goal: Absence of Device-Related Skin and Tissue Injury  Outcome: Ongoing, Progressing  Intervention: Maintain Skin and Tissue Health  Recent Flowsheet Documentation  Taken 3/17/2021 0838 by Arjun De La Torre RN  Device Skin Pressure Protection: absorbent pad utilized/changed     Problem: Ventilator-Induced Lung Injury (Mechanical Ventilation, Invasive)  Goal: Absence of Ventilator-Induced Lung Injury  Outcome: Ongoing, Progressing  Intervention: Prevent Ventilator-Associated Pneumonia  Recent Flowsheet Documentation  Taken 3/17/2021 1824 by Arjun De La Torre RN  Head of Bed (HOB): HOB at 30-45 degrees  Taken 3/17/2021 1609 by Arjun De La Torre RN  Head of Bed (HOB): HOB at 30-45 degrees  Taken 3/17/2021 1430 by Arjun De La Torre RN  Head of Bed (HOB): HOB at 20-30 degrees  Taken 3/17/2021 1210 by Arjun De La Torre RN  Head of Bed (HOB): HOB at 30-45 degrees  Taken 3/17/2021 1025 by Arjun De La Torre RN  Head of Bed (HOB): HOB at 30-45 degrees  Taken 3/17/2021 0838 by Arjun De La Torre RN  Head of Bed (HOB): HOB at 20-30 degrees     Problem: Fluid Imbalance (Pneumonia)  Goal: Fluid Balance  Outcome: Ongoing, Progressing     Problem: Infection (Pneumonia)  Goal: Resolution of Infection Signs and Symptoms  Outcome: Ongoing, Progressing     Problem: Respiratory Compromise (Pneumonia)  Goal: Effective Oxygenation and Ventilation  Outcome: Ongoing, Progressing  Intervention: Optimize Oxygenation and Ventilation  Recent Flowsheet Documentation  Taken 3/17/2021 1824 by Arjun De La Torre RN  Head of Bed (HOB): HOB at 30-45 degrees  Taken 3/17/2021 1609 by Arjun De La Torre RN  Head of Bed (HOB): HOB at 30-45 degrees  Taken 3/17/2021 1430 by Arjun eD La Torre RN  Head of Bed (HOB): HOB at 20-30 degrees  Taken 3/17/2021 1210 by Arjun De La Torre RN  Head of Bed (HOB): HOB at 30-45 degrees  Taken 3/17/2021 1025 by Arjun De La Torre  RN  Head of Bed (HOB): HOB at 30-45 degrees  Taken 3/17/2021 0838 by Arjun De La Torre RN  Head of Bed (HOB): HOB at 20-30 degrees   Goal Outcome Evaluation:

## 2021-03-17 NOTE — THERAPY TREATMENT NOTE
Patient Name: Jose R Dalton  : 1960    MRN: [id number]                              Today's Date: 3/17/2021       Admit Date: 3/7/2021    Visit Dx:     ICD-10-CM ICD-9-CM   1. Cerebrovascular accident (CVA), unspecified mechanism (CMS/HCC)  I63.9 434.91     Patient Active Problem List   Diagnosis   • Cerebrovascular accident (CVA) (CMS/HCC)     Past Medical History:   Diagnosis Date   • Elevated cholesterol    • Hypertension    • Obesity    • Seizure (CMS/HCC)    • Seizures (CMS/HCC)    • Stroke (CMS/HCC)      Past Surgical History:   Procedure Laterality Date   • CAROTID ENDARTERECTOMY Right    • CAROTID ENDARTERECTOMY Right    • EMBOLECTOMY N/A 3/7/2021    Procedure: MECHANICAL EMBOLECTOMY, WITH LEFT CAROTID ARTERY ANGIOPLASTY AND STENT PLACEMENT;  Surgeon: Nick Washington MD;  Location: [facility] ;  Service: Neurosurgery;  Laterality: N/A;     General Information     Row Name 21 1209 21 1119       OT Time and Intention    Document Type  therapy note (daily note)  -BT  therapy note (daily note)  -BT    Mode of Treatment  individual therapy  -BT  individual therapy;occupational therapy  -BT    Row Name 21 1119          General Information    Patient Profile Reviewed  yes  -BT     Existing Precautions/Restrictions  fall  -BT     Row Name 21 1119          Cognition    Orientation Status (Cognition)  oriented to;person;place  -BT     Row Name 21 1119          Safety Issues, Functional Mobility    Safety Issues Affecting Function (Mobility)  impulsivity;judgment;insight into deficits/self-awareness  -BT     Impairments Affecting Function (Mobility)  balance;cognition;coordination;endurance/activity tolerance;strength  -BT       User Key  (r) = Recorded By, (t) = Taken By, (c) = Cosigned By    Initials Name Provider Type    BT Tammy Cee OT Occupational Therapist          Mobility/ADL's     Row Name 21 1209          Bed Mobility    Bed Mobility  bed mobility  (all) activities  -BT     All Activities, [place] (Bed Mobility)  supervision  -BT     Assistive Device (Bed Mobility)  bed rails;head of bed elevated  -BT     Row Name 03/17/21 1209          Transfers    Transfers  sit-stand transfer  -BT     Assistive Device (Bed-Chair Transfers)  walker, front-wheeled  -BT     Sit-Stand [place] (Transfers)  verbal cues;nonverbal cues (demo/gesture);standby assist  -BT     Row Name 03/17/21 1209          Activities of Daily Living    BADL Assessment/Intervention  toileting  -BT     Row Name 03/17/21 1209          Toileting Assessment/Training    [place] Level (Toileting)  toileting skills;standby assist  -BT     Assistive Devices (Toileting)  commode;grab bar/safety frame  -BT     Position (Toileting)  supported standing;unsupported sitting  -BT       User Key  (r) = Recorded By, (t) = Taken By, (c) = Cosigned By    Initials Name Provider Type    BT Tammy Cee OT Occupational Therapist        Obj/Interventions     Row Name 03/17/21 1212          Shoulder (Therapeutic Exercise)    Shoulder (Therapeutic Exercise)  AROM (active range of motion)  -BT     Shoulder AROM (Therapeutic Exercise)  bilateral;flexion;extension;aBduction;aDduction;sitting;10 repetitions;2 sets  -BT     Row Name 03/17/21 1212          Elbow/Forearm (Therapeutic Exercise)    Elbow/Forearm (Therapeutic Exercise)  AROM (active range of motion)  -BT     Elbow/Forearm AROM (Therapeutic Exercise)  bilateral;flexion;extension;sitting;10 repetitions;2 sets  -BT     Row Name 03/17/21 1212          Therapeutic Exercise    Therapeutic Exercise  shoulder;elbow/forearm  -BT       User Key  (r) = Recorded By, (t) = Taken By, (c) = Cosigned By    Initials Name Provider Type    BT Tammy Cee OT Occupational Therapist        Goals/Plan    No documentation.       Clinical Impression     Row Name 03/17/21 1212          Pain Assessment    Additional Documentation  Pain Scale: Numbers Pre/Post-Treatment  (Group)  -BT     Row Name 03/17/21 1212          Pain Scale: Numbers Pre/Post-Treatment    Pretreatment Pain Rating  0/10 - no pain  -BT     Posttreatment Pain Rating  0/10 - no pain  -BT     Row Name 03/17/21 1212          Plan of Care Review    Plan of Care Reviewed With  patient  -BT     Progress  improving  -BT     Outcome Summary  Pt pleasant and agreeable to participate in skilled OT this AM. Pt required SBA/CGA for functional transfers and bed mobility with vc's for positioning of walker, as pt tends to push walker to the side intermittently. Pt completed toileting task with SBA and completed 2 x 10 sets of BUE AROM exercises to increase strength and activity tolerance with adls and functional transfers. Pt c/o fatigue throughout OOB activity but O2 sats remained at 100%. OT will continue to follow.  -BT     Row Name 03/17/21 1212          Positioning and Restraints    Pre-Treatment Position  in bed  -BT     Post Treatment Position  chair  -BT     In Chair  notified nsg;reclined;call light within reach;exit alarm on;encouraged to call for assist  -BT       User Key  (r) = Recorded By, (t) = Taken By, (c) = Cosigned By    Initials Name Provider Type    BT Tammy Cee OT Occupational Therapist        Outcome Measures     Row Name 03/17/21 1214          How much help from another is currently needed...    Putting on and taking off regular lower body clothing?  3  -BT     Bathing (including washing, rinsing, and drying)  3  -BT     Toileting (which includes using toilet bed pan or urinal)  3  -BT     Putting on and taking off regular upper body clothing  3  -BT     Taking care of personal grooming (such as brushing teeth)  4  -BT     Eating meals  4  -BT     AM-PAC 6 Clicks Score (OT)  20  -BT     Row Name 03/17/21 1214          Functional Assessment    Outcome Measure Options  AM-PAC 6 Clicks Daily Activity (OT)  -BT       User Key  (r) = Recorded By, (t) = Taken By, (c) = Cosigned By    Initials Name  Provider Type    BT Tammy Cee OT Occupational Therapist        Occupational Therapy Education                 Title: PT OT SLP Therapies (Done)     Topic: Occupational Therapy (Done)     Point: ADL training (Done)     Description:   Instruct learner(s) on proper safety adaptation and remediation techniques during self care or transfers.   Instruct in proper use of assistive devices.              Learning Progress Summary           Patient Acceptance, E, VU by BT at 3/17/2021 1214    Comment: purpose of OT, adl retraining, functional transfer training    Acceptance, E,TB,H, VU by MS at 3/16/2021 1813    Acceptance, E, VU by ROMAIN at 3/16/2021 1252    Comment: Pt educated on OT role, OT plan of care, and safety techniques during functional moblity and ADLs.   Family Acceptance, E,TB,H, VU by MS at 3/16/2021 1813                   Point: Home exercise program (Done)     Description:   Instruct learner(s) on appropriate technique for monitoring, assisting and/or progressing therapeutic exercises/activities.              Learning Progress Summary           Patient Acceptance, E, VU by BT at 3/17/2021 1214    Comment: purpose of OT, adl retraining, functional transfer training                   Point: Precautions (Done)     Description:   Instruct learner(s) on prescribed precautions during self-care and functional transfers.              Learning Progress Summary           Patient Acceptance, E, VU by BT at 3/17/2021 1214    Comment: purpose of OT, adl retraining, functional transfer training    Acceptance, E,TB,H, VU by MS at 3/16/2021 1813    Acceptance, E, VU by ROMAIN at 3/16/2021 1252    Comment: Pt educated on OT role, OT plan of care, and safety techniques during functional moblity and ADLs.   Family Acceptance, E,TB,H, VU by MS at 3/16/2021 1813                   Point: Body mechanics (Done)     Description:   Instruct learner(s) on proper positioning and spine alignment during self-care, functional mobility  activities and/or exercises.              Learning Progress Summary           Patient Acceptance, E, VU by BT at 3/17/2021 1214    Comment: purpose of OT, adl retraining, functional transfer training    Acceptance, E,TB,H, VU by MS at 3/16/2021 1813    Acceptance, E, VU by ROMAIN at 3/16/2021 1252    Comment: Pt educated on OT role, OT plan of care, and safety techniques during functional moblity and ADLs.   Family Acceptance, E,TB,H, VU by MS at 3/16/2021 1813                               User Key     Initials Effective Dates Name Provider Type Discipline    ROMAIN 09/14/20 -  Rayo Vallejo OT Occupational Therapist OT    MS 11/15/19 -  Arjun De La Torre, RN Registered Nurse Nurse    BT 11/16/20 -  Tammy Cee OT Occupational Therapist OT              OT Recommendation and Plan     Plan of Care Review  Plan of Care Reviewed With: patient  Progress: improving  Outcome Summary: Pt pleasant and agreeable to participate in skilled OT this AM. Pt required SBA/CGA for functional transfers and bed mobility with vc's for positioning of walker, as pt tends to push walker to the side intermittently. Pt completed toileting task with SBA and completed 2 x 10 sets of BUE AROM exercises to increase strength and activity tolerance with adls and functional transfers. Pt c/o fatigue throughout OOB activity but O2 sats remained at 100%. OT will continue to follow.     Time Calculation:   Time Calculation- OT     Row Name 03/17/21 1216             Time Calculation- OT    OT Start Time  1005  -BT      OT Stop Time  1028  -BT      OT Time Calculation (min)  23 min  -BT      Total Timed Code Minutes- OT  23 minute(s)  -BT      OT Received On  03/17/21  -BT      OT - Next Appointment  03/19/21  -BT        User Key  (r) = Recorded By, (t) = Taken By, (c) = Cosigned By    Initials Name Provider Type    BT Tammy Cee OT Occupational Therapist        Therapy Charges for Today     Code Description Service Date Service Provider Modifiers Qty     33506326566 HC OT SELF CARE/MGMT/TRAIN EA 15 MIN 3/17/2021 Tammy Cee OT GO 1    31902722303 HC OT THER PROC EA 15 MIN 3/17/2021 Tammy Cee OT GO 1               Tammy Cee OT  3/17/2021

## 2021-03-17 NOTE — THERAPY TREATMENT NOTE
Patient Name: Jose R Dalton  : 1960    MRN: [id number]                              Today's Date: 3/17/2021       Admit Date: 3/7/2021    Visit Dx:     ICD-10-CM ICD-9-CM   1. Cerebrovascular accident (CVA), unspecified mechanism (CMS/HCC)  I63.9 434.91     Patient Active Problem List   Diagnosis   • Cerebrovascular accident (CVA) (CMS/HCC)     Past Medical History:   Diagnosis Date   • Elevated cholesterol    • Hypertension    • Obesity    • Seizure (CMS/HCC)    • Seizures (CMS/HCC)    • Stroke (CMS/HCC)      Past Surgical History:   Procedure Laterality Date   • CAROTID ENDARTERECTOMY Right    • CAROTID ENDARTERECTOMY Right    • EMBOLECTOMY N/A 3/7/2021    Procedure: MECHANICAL EMBOLECTOMY, WITH LEFT CAROTID ARTERY ANGIOPLASTY AND STENT PLACEMENT;  Surgeon: Nick Washington MD;  Location: [facility] ;  Service: Neurosurgery;  Laterality: N/A;     General Information     Row Name 21 1158          Physical Therapy Time and Intention    Document Type  therapy note (daily note)  (Pended)   -MG     Mode of Treatment  individual therapy;physical therapy  (Pended)   -MG     Row Name 21 1158          General Information    Existing Precautions/Restrictions  fall  (Pended)   -MG     Row Name 21 1158          Cognition    Orientation Status (Cognition)  oriented to;person;place  (Pended)   -MG     Row Name 21 1158          Safety Issues, Functional Mobility    Safety Issues Affecting Function (Mobility)  judgment;impulsivity  (Pended)   -MG     Impairments Affecting Function (Mobility)  balance;endurance/activity tolerance;strength;coordination  (Pended)   -MG     Comment, Safety Issues/Impairments (Mobility)  gait belt and nonslip socks for safety.  (Pended)   -MG       User Key  (r) = Recorded By, (t) = Taken By, (c) = Cosigned By    Initials Name Provider Type    MG Amara Jimenez PTA Student PTA Student        Mobility     Row Name 21 1158          Bed Mobility     Supine-Sit [place] (Bed Mobility)  not tested  (Pended)   -MG     Sit-Supine [place] (Bed Mobility)  supervision  (Pended)   -MG     Assistive Device (Bed Mobility)  bed rails;head of bed elevated  (Pended)   -MG     Row Name 03/17/21 1159          Sit-Stand Transfer    Sit-Stand [place] (Transfers)  standby assist  (Pended)   -MG     Row Name 03/17/21 1159          Gait/Stairs (Locomotion)    [place] Level (Gait)  contact guard;verbal cues;nonverbal cues (demo/gesture)  (Pended)   -MG     Distance in Feet (Gait)  400ft, mildly unsteady  (Pended)   -MG     Deviations/Abnormal Patterns (Gait)  gait speed decreased;stride length decreased;ataxic  (Pended)   -MG       User Key  (r) = Recorded By, (t) = Taken By, (c) = Cosigned By    Initials Name Provider Type    Amara Bradley, PTA Student PTA Student        Obj/Interventions     Row Name 03/17/21 1200          Motor Skills    Therapeutic Exercise  --  (Pended)  declined ther. ex due to fatigue  -MG       User Key  (r) = Recorded By, (t) = Taken By, (c) = Cosigned By    Initials Name Provider Type    Amara Bradley, PTA Student PTA Student        Goals/Plan    No documentation.       Clinical Impression     Row Name 03/17/21 1200          Plan of Care Review    Plan of Care Reviewed With  patient  (Pended)   -MG     Progress  improving  (Pended)   -MG     Outcome Summary  pt c/o of fatigue today. pt ambulated 400ft ft with no AD and CGA. pt was unsteady at times but no significant LOB noted. pt performes STS transfers and bed mobility with SBA. pt declined ther. ex due to fatigue and wanted to take a nap. continue to progress pt as tolerated  (Pended)   -MG     Row Name 03/17/21 1200          Positioning and Restraints    Pre-Treatment Position  sitting in chair/recliner  (Pended)   -MG     Post Treatment Position  bed  (Pended)   -MG     In Bed  call light within reach;encouraged to call for assist;exit alarm on  (Pended)  HOB elevated   -MG       User Key  (r) = Recorded By, (t) = Taken By, (c) = Cosigned By    Initials Name Provider Type    MG Amara Jimenez, PTA Student PTA Student        Outcome Measures     Row Name 03/17/21 1202          Modified [place] Scale    Modified [place] Scale  4 - Moderately severe disability.  Unable to walk without assistance, and unable to attend to own bodily needs without assistance.  (Pended)   -MG       User Key  (r) = Recorded By, (t) = Taken By, (c) = Cosigned By    Initials Name Provider Type    MG Amara Jimenez, PTA Student PTA Student        Physical Therapy Education                 Title: PT OT SLP Therapies (Done)     Topic: Physical Therapy (Done)     Point: Mobility training (Done)     Learning Progress Summary           Patient Acceptance, E,D, VU,NR by  at 3/17/2021 1203    Acceptance, E,TB,H, VU by MS at 3/16/2021 1813    Acceptance, E,D, NR,DU by MG at 3/16/2021 1655    Acceptance, E,D, NR by  at 3/15/2021 1221    Acceptance, E,TB,D, VU,NR by  at 3/13/2021 1632   Family Acceptance, E,TB,H, VU by MS at 3/16/2021 1813                   Point: Home exercise program (Done)     Learning Progress Summary           Patient Acceptance, E,D, VU,NR by MG at 3/17/2021 1203    Acceptance, E,TB,H, VU by MS at 3/16/2021 1813    Acceptance, E,D, NR,DU by MG at 3/16/2021 1655    Acceptance, E,D, NR by MG at 3/15/2021 1221    Acceptance, E,TB,D, VU,NR by  at 3/13/2021 1632   Family Acceptance, E,TB,H, VU by MS at 3/16/2021 1813                               User Key     Initials Effective Dates Name Provider Type Discipline     04/03/18 -  Krystina Beltrán, PT Physical Therapist PT    MS 11/15/19 -  Arjun De La Torre, RN Registered Nurse Nurse     02/26/21 -  Amara Jimenez, PTA Student PTA Student PT              PT Recommendation and Plan     Plan of Care Reviewed With: (P) patient  Progress: (P) improving  Outcome Summary: (P) pt c/o of fatigue today. pt ambulated 400ft ft with no AD and CGA. pt  was unsteady at times but no significant LOB noted. pt performes STS transfers and bed mobility with SBA. pt declined ther. ex due to fatigue and wanted to take a nap. continue to progress pt as tolerated     Time Calculation:   PT Charges     Row Name 03/17/21 1156             Time Calculation    Start Time  1055  (Pended)   -MG      Stop Time  1105  (Pended)   -MG      Time Calculation (min)  10 min  (Pended)   -MG      PT Received On  03/17/21  (Pended)   -MG      PT - Next Appointment  03/18/21  (Pended)   -MG         Time Calculation- PT    Total Timed Code Minutes- PT  10 minute(s)  (Pended)   -MG        User Key  (r) = Recorded By, (t) = Taken By, (c) = Cosigned By    Initials Name Provider Type    MG Amara Jimenez PTA Student PTA Student        Therapy Charges for Today     Code Description Service Date Service Provider Modifiers Qty    06663673562 HC GAIT TRAINING EA 15 MIN 3/16/2021 Amara Jimenez PTA Student GP 1    10100340514 HC GAIT TRAINING EA 15 MIN 3/17/2021 Amara Jimenez PTA Student GP 1          PT G-Codes  Outcome Measure Options: AM-PAC 6 Clicks Daily Activity (OT)  AM-PAC 6 Clicks Score (OT): 20  Modified [place] Scale: (P) 4 - Moderately severe disability.  Unable to walk without assistance, and unable to attend to own bodily needs without assistance.    Amara Jimenez PTA Student  3/17/2021

## 2021-03-17 NOTE — PROGRESS NOTES
[facility] Rehab Note     Never received return phone call from Harsh lema.  After seeing most recent therapy notes which were reviewed with Dr. Arboleda, patient is too high level for our services.  Patient is ambulating 400 ft with no device, minor loss of balance but able to self correct per therapy.  At this time we will sign off.  DENITA Burden, notified.  Thank you for the referral.    Cecelia Freed RN  Rehab Admission Nurse   [phone]

## 2021-03-17 NOTE — PLAN OF CARE
Goal Outcome Evaluation:  Plan of Care Reviewed With: patient  Progress: improving  Outcome Summary: Pt pleasant and agreeable to participate in skilled OT this AM. Pt required SBA/CGA for functional transfers and bed mobility with vc's for positioning of walker, as pt tends to push walker to the side intermittently. Pt completed toileting task with SBA and completed 2 x 10 sets of BUE AROM exercises to increase strength and activity tolerance with adls and functional transfers. Pt c/o fatigue throughout OOB activity but O2 sats remained at 100%. OT will continue to follow.    Pt wore face mask during this therapy encounter. Therapist wore appropriate PPE including face mask, gloves, and eye protection. Hand hygiene completed before and after this therapy session. Pt not in enhanced precautions.

## 2021-03-17 NOTE — PROGRESS NOTES
[facility] INPATIENT PROGRESS NOTE         [address]    3/17/2021      PATIENT IDENTIFICATION:  Name: Jose R Dalton ADMIT: 3/7/2021   : 1960  PCP: Provider, No Known    MRN: [id number] LOS: 10 days   AGE/SEX: 60 y.o. male  ROOM: [facility]                     LOS 10    Reason for visit: Aspiration pneumonia with acute stroke      SUBJECTIVE:        Resting comfortably.  No longer requiring restraints.  Calm and cooperative.  Working on rehab.    Objective   OBJECTIVE:    Vital Sign Min/Max for last 24 hours  Temp  Min: 97 °F (36.1 °C)  Max: 98.6 °F (37 °C)   BP  Min: 123/85  Max: 162/88   Pulse  Min: 59  Max: 84   Resp  Min: 16  Max: 18   SpO2  Min: 98 %  Max: 100 %   No data recorded   No data recorded                   FiO2 (%): (!) 20 %     Body mass index is 32.9 kg/m².    Intake/Output Summary (Last 24 hours) at 3/17/2021 1415  Last data filed at 3/17/2021 0542  Gross per 24 hour   Intake 1200 ml   Output 400 ml   Net 800 ml         Exam:  GEN:  No distress, appears stated age.    EYES:   PERRL, anicteric sclera  ENT:    External ears/nose normal, OP clear  NECK:  No adenopathy, midline trachea  LUNGS: Normal chest on inspection, palpation and auscultation  CV:  Normal S1S2, without murmur  ABD:  Non tender, non distended, no hepatosplenomegaly, +BS  EXT:  No edema, cyanosis or clubbing    Assessment     Scheduled meds:  amLODIPine, 10 mg, Oral, Q24H  aspirin, 81 mg, Oral, Daily  atorvastatin, 80 mg, Oral, Nightly  chlorhexidine, 15 mL, Mouth/Throat, Q12H  cloNIDine, 0.3 mg, Oral, Q8H  hydrALAZINE, 25 mg, Oral, Q8H  nicotine, 1 patch, Transdermal, Q24H  QUEtiapine, 50 mg, Oral, Q12H  ticagrelor, 90 mg, Oral, Daily      IV meds:                         Data Review:  Results from last 7 days   Lab Units 21  1311 21  0607 21  0534 03/15/21  0841 21  0756 21  0900   SODIUM mmol/L  --  147* 156* 153* 150* 146*   POTASSIUM mmol/L 3.7 3.4* 3.7 3.2* 4.0 3.9    CHLORIDE mmol/L  --  111* 119* 115* 116* 111*   CO2 mmol/L  --  24.9 25.9 24.0 22.8 21.1*   BUN mg/dL  --  28* 40* 40* 38* 29*   CREATININE mg/dL  --  1.29* 1.35* 1.38* 0.98 1.13   GLUCOSE mg/dL  --  131* 110* 108* 146* 160*   CALCIUM mg/dL  --  8.6 8.9 9.3 8.9 8.8         Estimated Creatinine Clearance: 81.2 mL/min (A) (by C-G formula based on SCr of 1.29 mg/dL (H)).  Results from last 7 days   Lab Units 03/14/21  0756 03/12/21  0813 03/11/21  0740   WBC 10*3/mm3 16.99* 10.31 11.32*   HEMOGLOBIN g/dL 10.4* 11.1* 12.3*   PLATELETS 10*3/mm3 195 167 156             Results from last 7 days   Lab Units 03/12/21  1201 03/12/21  0418   PH, ARTERIAL pH units 7.525* 7.459*   PO2 ART mm Hg 91.6 77.7*   PCO2, ARTERIAL mm Hg 30.7* 34.1*   HCO3 ART mmol/L 25.3 24.2             Chest x-ray 3/12/2021 reviewed: No acute          Microbiology reviewed  )        Active Hospital Problems    Diagnosis  POA   • Cerebrovascular accident (CVA) (CMS/[facility]) [I63.9]  Yes      Resolved Hospital Problems   No resolved problems to display.         ASSESSMENT:    1. Aspiration pneumonia  2. Acute stroke likely secondary to left carotid occlusion status post TPA and stenting  3. History of right MCA stroke previously  4. HTN  5. HLD  6. Seizure history  7. Smoker  8. H/o Polysubstance abuse  9. MARIO  10. Metabolic acidosis improved  11. Leukocytosis  12. Hypernatremia    PLAN:    Much less confused.  Calm and cooperative.  Continue current anticoagulation per neurology recommendations on high-dose statin for secondary stroke prevention.  Control blood pressure.  Goal less than 140 systolic.    [facility] I do not think that it is [facility] at [facility] as family was interested in considering.  Control glucose.  Dysphagia diet.  PT/ST/OT  CCP working on rehab placement.  Awaiting pre-CERT.      Tariq Boone MD  Pulmonary and Critical Care Medicine  [facility],  [facility]  3/17/2021    14:15 EDT

## 2021-03-17 NOTE — PROGRESS NOTES
Continued Stay Note  [facility]     Patient Name: Jose R Dalton  MRN: [id number]  Today's Date: 3/17/2021    Admit Date: 3/7/2021    Discharge Plan     Row Name 03/17/21 1651       Plan    Plan  [facility] vs SNF    Plan Comments  Received call from April with [facility], may not be able to accept, as patient is too high functioning.  Need to explore SNF with family.        Discharge Codes    No documentation.             Jenise Saavedra RN

## 2021-03-18 LAB
ALBUMIN SERPL-MCNC: 4.2 G/DL (ref 3.5–5.2)
ANION GAP SERPL CALCULATED.3IONS-SCNC: 10.8 MMOL/L (ref 5–15)
BUN SERPL-MCNC: 20 MG/DL (ref 6–20)
BUN/CREAT SERPL: 16 (ref 7–25)
CALCIUM SPEC-SCNC: 9 MG/DL (ref 8.6–10.5)
CHLORIDE SERPL-SCNC: 105 MMOL/L (ref 98–107)
CO2 SERPL-SCNC: 27.2 MMOL/L (ref 22–29)
CREAT SERPL-MCNC: 1.25 MG/DL (ref 0.76–1.27)
GFR SERPL CREATININE-BSD FRML MDRD: 72 ML/MIN/1.73
GLUCOSE SERPL-MCNC: 81 MG/DL (ref 65–99)
PHOSPHATE SERPL-MCNC: 3.1 MG/DL (ref 2.5–4.5)
POTASSIUM SERPL-SCNC: 3.5 MMOL/L (ref 3.5–5.2)
SODIUM SERPL-SCNC: 143 MMOL/L (ref 136–145)

## 2021-03-18 PROCEDURE — 97110 THERAPEUTIC EXERCISES: CPT

## 2021-03-18 PROCEDURE — 97535 SELF CARE MNGMENT TRAINING: CPT

## 2021-03-18 PROCEDURE — 80069 RENAL FUNCTION PANEL: CPT | Performed by: INTERNAL MEDICINE

## 2021-03-18 RX ADMIN — CLONIDINE HYDROCHLORIDE 0.3 MG: 0.1 TABLET ORAL at 22:43

## 2021-03-18 RX ADMIN — CLONIDINE HYDROCHLORIDE 0.3 MG: 0.1 TABLET ORAL at 05:30

## 2021-03-18 RX ADMIN — QUETIAPINE FUMARATE 50 MG: 50 TABLET, FILM COATED ORAL at 22:42

## 2021-03-18 RX ADMIN — TICAGRELOR 90 MG: 90 TABLET ORAL at 08:15

## 2021-03-18 RX ADMIN — ASPIRIN 81 MG: 81 TABLET, CHEWABLE ORAL at 08:15

## 2021-03-18 RX ADMIN — CLONIDINE HYDROCHLORIDE 0.3 MG: 0.1 TABLET ORAL at 15:20

## 2021-03-18 RX ADMIN — HYDRALAZINE HYDROCHLORIDE 25 MG: 25 TABLET, FILM COATED ORAL at 15:20

## 2021-03-18 RX ADMIN — Medication 1 PATCH: at 08:16

## 2021-03-18 RX ADMIN — HYDRALAZINE HYDROCHLORIDE 25 MG: 25 TABLET, FILM COATED ORAL at 22:43

## 2021-03-18 RX ADMIN — QUETIAPINE FUMARATE 50 MG: 50 TABLET, FILM COATED ORAL at 08:15

## 2021-03-18 RX ADMIN — AMLODIPINE BESYLATE 10 MG: 10 TABLET ORAL at 08:15

## 2021-03-18 RX ADMIN — ATORVASTATIN CALCIUM 80 MG: 80 TABLET, FILM COATED ORAL at 22:43

## 2021-03-18 RX ADMIN — HYDRALAZINE HYDROCHLORIDE 25 MG: 25 TABLET, FILM COATED ORAL at 05:30

## 2021-03-18 NOTE — THERAPY TREATMENT NOTE
Patient Name: Jose R Dalton  : 1960    MRN: [id number]                              Today's Date: 3/18/2021       Admit Date: 3/7/2021    Visit Dx:     ICD-10-CM ICD-9-CM   1. Cerebrovascular accident (CVA), unspecified mechanism (CMS/HCC)  I63.9 434.91     Patient Active Problem List   Diagnosis   • Cerebrovascular accident (CVA) (CMS/HCC)     Past Medical History:   Diagnosis Date   • Elevated cholesterol    • Hypertension    • Obesity    • Seizure (CMS/HCC)    • Seizures (CMS/HCC)    • Stroke (CMS/HCC)      Past Surgical History:   Procedure Laterality Date   • CAROTID ENDARTERECTOMY Right    • CAROTID ENDARTERECTOMY Right    • EMBOLECTOMY N/A 3/7/2021    Procedure: MECHANICAL EMBOLECTOMY, WITH LEFT CAROTID ARTERY ANGIOPLASTY AND STENT PLACEMENT;  Surgeon: Nick Washington MD;  Location: [facility] ;  Service: Neurosurgery;  Laterality: N/A;     General Information     Row Name 21 1522          Physical Therapy Time and Intention    Document Type  therapy note (daily note)  -MS     Mode of Treatment  physical therapy;individual therapy  -MS     Row Name 21 1522          General Information    Patient Profile Reviewed  yes  -MS     Existing Precautions/Restrictions  (S) fall Exit alarm  -MS     Barriers to Rehab  none identified  -MS     Row Name 21 1522          Safety Issues, Functional Mobility    Comment, Safety Issues/Impairments (Mobility)  Gait belt used for safety.  -MS       User Key  (r) = Recorded By, (t) = Taken By, (c) = Cosigned By    Initials Name Provider Type    MS Dennis Mckeon, PT Physical Therapist        Mobility     Row Name 21 1522          Bed Mobility    Comment (Bed Mobility)  Up in chair this PM.  -MS     Row Name 21 1522          Sit-Stand Transfer    Sit-Stand Wake (Transfers)  standby assist  -MS     Row Name 21 1522          Gait/Stairs (Locomotion)    Wake Level (Gait)  contact guard  -MS     Distance in  Feet (Gait)  400 feet  -MS     Deviations/Abnormal Patterns (Gait)  brian decreased  -MS     Comment (Gait/Stairs)  Unsteady at times but no overt losses of balance.  -MS       User Key  (r) = Recorded By, (t) = Taken By, (c) = Cosigned By    Initials Name Provider Type    Dennis Hurley, PT Physical Therapist        Obj/Interventions     Row Name 03/18/21 1523          Motor Skills    Therapeutic Exercise  -- BLE Standing ther. ex. program x 10 reps completed (Heel Raises, Mini-Squats, Hip Abduction)  -MS       User Key  (r) = Recorded By, (t) = Taken By, (c) = Cosigned By    Initials Name Provider Type    Dennis Hurley, PT Physical Therapist        Goals/Plan    No documentation.       Clinical Impression     Row Name 03/18/21 1524          Pain    Additional Documentation  Pain Scale: Numbers Pre/Post-Treatment (Group)  -MS     Row Name 03/18/21 1524          Pain Scale: Numbers Pre/Post-Treatment    Pretreatment Pain Rating  0/10 - no pain  -MS     Posttreatment Pain Rating  0/10 - no pain  -MS     Row Name 03/18/21 1524          Positioning and Restraints    Pre-Treatment Position  sitting in chair/recliner  -MS     Post Treatment Position  chair  -MS     In Chair  notified nsg;sitting;call light within reach;encouraged to call for assist;exit alarm on;with family/caregiver All lines intact.  -MS       User Key  (r) = Recorded By, (t) = Taken By, (c) = Cosigned By    Initials Name Provider Type    Dennis Hurley, PT Physical Therapist        Outcome Measures     Row Name 03/18/21 1524          How much help from another person do you currently need...    Turning from your back to your side while in flat bed without using bedrails?  4  -MS     Moving from lying on back to sitting on the side of a flat bed without bedrails?  3  -MS     Moving to and from a bed to a chair (including a wheelchair)?  3  -MS     Standing up from a chair using your arms (e.g., wheelchair, bedside chair)?  3  -MS      Climbing 3-5 steps with a railing?  3  -MS     To walk in hospital room?  3  -MS     AM-PAC 6 Clicks Score (PT)  19  -MS     Row Name 03/18/21 1524          Functional Assessment    Outcome Measure Options  AM-PAC 6 Clicks Basic Mobility (PT)  -MS       User Key  (r) = Recorded By, (t) = Taken By, (c) = Cosigned By    Initials Name Provider Type    MS Dennis Mckeon, PT Physical Therapist        Physical Therapy Education                 Title: PT OT SLP Therapies (Done)     Topic: Physical Therapy (Done)     Point: Mobility training (Done)     Learning Progress Summary           Patient Acceptance, E,D, VU,NR by MS at 3/18/2021 1524    Acceptance, E,TB, VU by MS1 at 3/17/2021 1826    Acceptance, E,D, VU,NR by MG at 3/17/2021 1203    Acceptance, E,TB,H, VU by MS1 at 3/16/2021 1813    Acceptance, E,D, NR,DU by MG at 3/16/2021 1655    Acceptance, E,D, NR by MG at 3/15/2021 1221    Acceptance, E,TB,D, VU,NR by  at 3/13/2021 1632   Family Acceptance, E,TB,H, VU by MS1 at 3/16/2021 1813                   Point: Home exercise program (Done)     Learning Progress Summary           Patient Acceptance, E,D, VU,NR by MS at 3/18/2021 1524    Acceptance, E,TB, VU by MS1 at 3/17/2021 1826    Acceptance, E,D, VU,NR by MG at 3/17/2021 1203    Acceptance, E,TB,H, VU by MS1 at 3/16/2021 1813    Acceptance, E,D, NR,DU by MG at 3/16/2021 1655    Acceptance, E,D, NR by MG at 3/15/2021 1221    Acceptance, E,TB,D, VU,NR by  at 3/13/2021 1632   Family Acceptance, E,TB,H, VU by MS1 at 3/16/2021 1813                               User Key     Initials Effective Dates Name Provider Type Discipline    MS 04/03/18 -  Dennis Mckeon, PT Physical Therapist PT    SV 04/03/18 -  Krystina Beltrán, PT Physical Therapist PT    MS1 11/15/19 -  Arjun De La Torre, RN Registered Nurse Nurse    MG 02/26/21 -  Amara Jimenez, PTA Student PTA Student PT              PT Recommendation and Plan     Plan of Care Reviewed With: patient  Outcome  Summary: Pt. able to ambulate 400 feet, CGA x 1, with no use of A.D. this date.  Pt. requires SBA x 1 for sit <-> stand transfers.  BLE standing ther. ex. program x 10 reps completed for general strengthening. Unsteady at times during gait but no overt losses of balance.     Time Calculation:   PT Charges     Row Name 03/18/21 1526             Time Calculation    Start Time  1427  -MS      Stop Time  1440  -MS      Time Calculation (min)  13 min  -MS      PT Received On  03/18/21  -MS      PT - Next Appointment  03/19/21  -MS         Time Calculation- PT    Total Timed Code Minutes- PT  12 minute(s)  -MS        User Key  (r) = Recorded By, (t) = Taken By, (c) = Cosigned By    Initials Name Provider Type    Dennis Hurley, PT Physical Therapist        Therapy Charges for Today     Code Description Service Date Service Provider Modifiers Qty    [id number] HC PT THER PROC EA 15 MIN 3/18/2021 Dennis Mckeon, PT GP 1          PT G-Codes  Outcome Measure Options: AM-PAC 6 Clicks Daily Activity (OT)  AM-PAC 6 Clicks Score (PT): 19  AM-PAC 6 Clicks Score (OT): 24  Modified [place] Scale: 3 - Moderate disability.  Requiring some help, but able to walk without assistance.    Dennis Mckeon, PT  3/18/2021

## 2021-03-18 NOTE — PLAN OF CARE
Problem: Adult Inpatient Plan of Care  Goal: Patient-Specific Goal (Individualized)  Outcome: Ongoing, Progressing  Flowsheets (Taken 3/18/2021 1954)  Patient-Specific Goals (Include Timeframe): Plan is to discharge home within the next couple days.  Anxieties, Fears or Concerns: Pt concerned with being indepedent at discharge and voiced frequently about wanting to leave to unit. He was compliant and stayed in his room or walked around the nurses station.   Goal Outcome Evaluation:  Plan of Care Reviewed With: patient  Progress: improving  Outcome Summary: Pt AOx4, VSS, denies pain, NIH consistent. Pt compliant with wearing heart and oxygen monitor throughout shift; pleasant throughout shift. Plan is for patient to return home at discharge. PT to work on stairs with pt tomorrow. Will continue to monitor.

## 2021-03-18 NOTE — PLAN OF CARE
Goal Outcome Evaluation:  Plan of Care Reviewed With: patient     Outcome Summary: Pt appears much better this date. Pt ambulated to/from bathroom with SBA-supervision. Pt indep to don/doff socks from seated position. Pt reports limited ADL deficits this date. Pt appears to have returned to baseline level of indep with self care and is planning to discharge home with support. OT will s/o at this time as skilled treatment is no longer warranted. OT recommends d/c home with assist.    Patient was not wearing a face mask during this therapy encounter. Therapist used appropriate personal protective equipment including mask, gloves, and eye protection.  Mask used was standard procedure mask. Appropriate PPE was worn during the entire therapy session. Hand hygiene was completed before and after therapy session. Patient is not in enhanced droplet precautions.

## 2021-03-18 NOTE — PLAN OF CARE
"Goal Outcome Evaluation:  Plan of Care Reviewed With: patient, family  Progress: improving     Pt A+O x4, VSS. NIHSS consistent. He is more anxious this shift compared to last. Beginning of nightshift, pt was very upset about still being in the hospital because it prevented him from getting a haircut and shaving beard and he \"wasn't feeling good.\" Pt did have a shower tonight. Pt then expressed frustration about mistrust in \"people keeping with their promises.\" He initially refused to talk to family on the phone, but then completely calmed down once he was able to ambulate around the whole unit with his ex-wife, Jayashree. Sister, Naty, stayed overnight with pt and he slept well. Pt has been compliant all night with being on the monitor and wearing SCDs. He has been pleasant with staff and no need for Ativan or restraints. However, he has expressed needing to talk \"to the doctor as soon as he makes rounds\" in order to figure out when he can be discharged from the hospital. I will continue to monitor and progress towards goals as tolerated.   "

## 2021-03-18 NOTE — SIGNIFICANT NOTE
03/18/21 1139   OTHER   Discipline speech language pathologist   Rehab Time/Intention   Session Not Performed other (see comments)     Attempted follow up for dysphagia and speech therapy. Pt sleeping soundly, does not wake with SLP entry to room, verbal cues. Will attempt follow up this PM if able or next date as appropriate.

## 2021-03-18 NOTE — PLAN OF CARE
Goal Outcome Evaluation:  Plan of Care Reviewed With: patient     Outcome Summary: Pt. able to ambulate 400 feet, CGA x 1, with no use of A.D. this date.  Pt. requires SBA x 1 for sit <-> stand transfers.  BLE standing ther. ex. program x 10 reps completed for general strengthening. Unsteady at times during gait but no overt losses of balance.    Patient was wearing a face mask during this therapy encounter. Therapist used appropriate personal protective equipment including eye protection, mask, and gloves.  Mask used was standard procedure mask. Appropriate PPE was worn during the entire therapy session. Hand hygiene was completed before and after therapy session. Patient is not in enhanced droplet precautions.

## 2021-03-18 NOTE — PROGRESS NOTES
Continued Stay Note  [facility]     Patient Name: Jose R Dalton  MRN: [id number]  Today's Date: 3/18/2021    Admit Date: 3/7/2021    Discharge Plan     Row Name 03/18/21 1602       Plan    Plan  Home with family    Patient/Family in Agreement with Plan  yes    Plan Comments  Met with patient at the bedside. Discussed DC plans. Patient is ambulating 400 ft without any devices. He does have LOB at times and patient verbalized he has LOB when walking too fast and explained to CCP he knows he needs to slow down when ambulating and that therapy explained this to him also. Patient did state he has 4-5 steps at home he will have to navigate and has not worked on stairs here. Note left for PT to work with patient on steps. Patient planning to return home and will have S/O there with him to assist as needed. Outbound call to daughter Harsh to update that patient will not qualify for rehab because he is doing too well at this point. Harsh verbalized understanding and stated she would call the patient's sister to update her. CCP will follow for any additional needs. Crystal Kaye RN        Discharge Codes    No documentation.             Crystal Kaye RN

## 2021-03-18 NOTE — THERAPY DISCHARGE NOTE
Acute Care - Occupational Therapy Discharge  [facility]    Patient Name: Jose R Dalton  : 1960    MRN: [id number]                              Today's Date: 3/18/2021       Admit Date: 3/7/2021    Visit Dx:     ICD-10-CM ICD-9-CM   1. Cerebrovascular accident (CVA), unspecified mechanism (CMS/HCC)  I63.9 434.91     Patient Active Problem List   Diagnosis   • Cerebrovascular accident (CVA) (CMS/HCC)     Past Medical History:   Diagnosis Date   • Elevated cholesterol    • Hypertension    • Obesity    • Seizure (CMS/HCC)    • Seizures (CMS/HCC)    • Stroke (CMS/HCC)      Past Surgical History:   Procedure Laterality Date   • CAROTID ENDARTERECTOMY Right    • CAROTID ENDARTERECTOMY Right    • EMBOLECTOMY N/A 3/7/2021    Procedure: MECHANICAL EMBOLECTOMY, WITH LEFT CAROTID ARTERY ANGIOPLASTY AND STENT PLACEMENT;  Surgeon: Nick Washington MD;  Location: [facility] ;  Service: Neurosurgery;  Laterality: N/A;     General Information     Row Name 21 1520          OT Time and Intention    Document Type  discharge treatment  -SARAY     Mode of Treatment  individual therapy;occupational therapy  -SARAY     Row Name 21 1520          General Information    Patient Profile Reviewed  yes  -SARAY     Existing Precautions/Restrictions  fall  -SARAY     Row Name 21 1520          Cognition    Orientation Status (Cognition)  oriented x 3  -SARAY     Row Name 21 1520          Safety Issues, Functional Mobility    Impairments Affecting Function (Mobility)  endurance/activity tolerance;balance  -SARAY     Comment, Safety Issues/Impairments (Mobility)  gait belt and non skid socks used  -SARAY       User Key  (r) = Recorded By, (t) = Taken By, (c) = Cosigned By    Initials Name Provider Type    Sonal August OT Occupational Therapist        Mobility/ADL's     Row Name 21 152          Bed Mobility    Comment (Bed Mobility)  NT. up at EOB upon arrival  -SARAY     Row Name 21 152           Transfers    Transfers  sit-stand transfer;toilet transfer  -     Sit-Stand Cassia (Transfers)  standby assist;supervision  -     Cassia Level (Toilet Transfer)  set up;standby assist;supervision  -[facility] Name 03/18/21 1521          Toilet Transfer    Type (Toilet Transfer)  stand-sit;sit-stand  -[facility] Name 03/18/21 1521          Functional Mobility    Functional Mobility- Ind. Level  1 person;standby assist;supervision required  -     Functional Mobility-Distance (Feet)  10  -     Functional Mobility- Comment  Pt ambulated to/from bathroom with SBA-supervision using no AD. No overt LOB noted.  -[facility] Name 03/18/21 1521          Activities of Daily Living    BADL Assessment/Intervention  lower body dressing  -[facility] Name 03/18/21 1521          Lower Body Dressing Assessment/Training    Cassia Level (Lower Body Dressing)  don;doff;socks;modified independence  -     Position (Lower Body Dressing)  supported sitting  -[facility] Name 03/18/21 1521          Toileting Assessment/Training    Cassia Level (Toileting)  toileting skills;supervision;modified independence  -     Assistive Devices (Toileting)  commode  -     Position (Toileting)  unsupported standing  -       User Key  (r) = Recorded By, (t) = Taken By, (c) = Cosigned By    Initials Name Provider Type    Sonal August OT Occupational Therapist        Obj/Interventions     [facility] Name 03/18/21 1523          Strength Comprehensive (MMT)    General Manual Muscle Testing (MMT) Assessment  no strength deficits identified  -     Comment, General Manual Muscle Testing (MMT) Assessment  Retested due to discharge. WFL  -[facility] Name 03/18/21 1523          Balance    Balance Assessment  sitting static balance;standing static balance;standing dynamic balance  -     Static Sitting Balance  WFL;unsupported;sitting, edge of bed  -SARAY     Static Standing Balance  WFL;unsupported;standing  -SARAY     Dynamic  Standing Balance  WFL;unsupported;standing  -SARAY     Comment, Balance  Pt ambulated to/from bathroom with SBA-supervision. No overt LOB noted.  -SARAY       User Key  (r) = Recorded By, (t) = Taken By, (c) = Cosigned By    Initials Name Provider Type    Sonal August OT Occupational Therapist        Goals/Plan     Row Name 03/18/21 1525          Transfer Goal 1 (OT)    Progress/Outcome (Transfer Goal 1, OT)  goal partially met  -SARAY     Row Name 03/18/21 1525          Bathing Goal 1 (OT)    Progress/Outcomes (Bathing Goal 1, OT)  goal partially met  -SARAY     Row Name 03/18/21 1525          Toileting Goal 1 (OT)    Progress/Outcome (Toileting Goal 1, OT)  goal met  -SARAY     Row Name 03/18/21 1525          Grooming Goal 1 (OT)    Progress/Outcome (Grooming Goal 1, OT)  goal partially met  -       User Key  (r) = Recorded By, (t) = Taken By, (c) = Cosigned By    Initials Name Provider Type    Sonal August OT Occupational Therapist        Clinical Impression     Row Name 03/18/21 1523          Pain Scale: Numbers Pre/Post-Treatment    Pretreatment Pain Rating  0/10 - no pain  -SARAY     Posttreatment Pain Rating  0/10 - no pain  -SARAY     Pain Intervention(s)  Repositioned;Ambulation/increased activity;Emotional support  -     Row Name 03/18/21 1523          Plan of Care Review    Plan of Care Reviewed With  patient  -SARAY     Outcome Summary  Pt appears much better this date. Pt ambulated to/from bathroom with SBA-supervision. Pt indep to don/doff socks from seated position. Pt reports limited ADL deficits this date. Pt appears to have returned to baseline level of indep with self care and is planning to discharge home with support. OT will s/o at this time as skilled treatment is no longer warranted. OT recommends d/c home with assist.  -     Row Name 03/18/21 1523          Therapy Assessment/Plan (OT)    Predicted Duration of Therapy Intervention (OT)  discharge note  -     Row Name 03/18/21 1521           Vital Signs    Recovery Time  WFL on RA throughout session. VSS  -SARAY     Row Name 03/18/21 1523          Positioning and Restraints    Pre-Treatment Position  in bed  -SARAY     Post Treatment Position  chair  -SARAY     In Chair  notified nsg;sitting;call light within reach;encouraged to call for assist;with family/caregiver;exit alarm on  -SARAY       User Key  (r) = Recorded By, (t) = Taken By, (c) = Cosigned By    Initials Name Provider Type    Sonal August OT Occupational Therapist        Outcome Measures     Row Name 03/18/21 1526          How much help from another is currently needed...    Putting on and taking off regular lower body clothing?  4  -SARAY     Bathing (including washing, rinsing, and drying)  4  -SARAY     Toileting (which includes using toilet bed pan or urinal)  4  -SARAY     Putting on and taking off regular upper body clothing  4  -SARAY     Taking care of personal grooming (such as brushing teeth)  4  -SARAY     Eating meals  4  -SARAY     AM-PAC 6 Clicks Score (OT)  24  -SARAY     Row Name 03/18/21 1526          Modified [place] Scale    Modified [place] Scale  3 - Moderate disability.  Requiring some help, but able to walk without assistance.  -SARAY     Row Name 03/18/21 1526          Functional Assessment    Outcome Measure Options  AM-PAC 6 Clicks Daily Activity (OT)  -SARAY       User Key  (r) = Recorded By, (t) = Taken By, (c) = Cosigned By    Initials Name Provider Type    Sonal August OT Occupational Therapist        Occupational Therapy Education                 Title: PT OT SLP Therapies (Done)     Topic: Occupational Therapy (Done)     Point: ADL training (Done)     Description:   Instruct learner(s) on proper safety adaptation and remediation techniques during self care or transfers.   Instruct in proper use of assistive devices.              Learning Progress Summary           Patient Acceptance, E,TB, VU by MS at 3/17/2021 1826    Acceptance, E, VU by BT at 3/17/2021 1214     Comment: purpose of OT, adl retraining, functional transfer training    Acceptance, E,TB,H, VU by MS at 3/16/2021 1813    Acceptance, E, VU by ROMAIN at 3/16/2021 1252    Comment: Pt educated on OT role, OT plan of care, and safety techniques during functional moblity and ADLs.   Family Acceptance, E,TB,H, VU by MS at 3/16/2021 1813                   Point: Home exercise program (Done)     Description:   Instruct learner(s) on appropriate technique for monitoring, assisting and/or progressing therapeutic exercises/activities.              Learning Progress Summary           Patient Acceptance, E,TB, VU by MS at 3/17/2021 1826    Acceptance, E, VU by BT at 3/17/2021 1214    Comment: purpose of OT, adl retraining, functional transfer training                   Point: Precautions (Done)     Description:   Instruct learner(s) on prescribed precautions during self-care and functional transfers.              Learning Progress Summary           Patient Acceptance, E,TB, VU by MS at 3/17/2021 1826    Acceptance, E, VU by BT at 3/17/2021 1214    Comment: purpose of OT, adl retraining, functional transfer training    Acceptance, E,TB,H, VU by MS at 3/16/2021 1813    Acceptance, E, VU by ROMAIN at 3/16/2021 1252    Comment: Pt educated on OT role, OT plan of care, and safety techniques during functional moblity and ADLs.   Family Acceptance, E,TB,H, VU by MS at 3/16/2021 1813                   Point: Body mechanics (Done)     Description:   Instruct learner(s) on proper positioning and spine alignment during self-care, functional mobility activities and/or exercises.              Learning Progress Summary           Patient Acceptance, E,TB, VU by MS at 3/17/2021 1826    Acceptance, E, VU by BT at 3/17/2021 1214    Comment: purpose of OT, adl retraining, functional transfer training    Acceptance, E,TB,H, VU by MS at 3/16/2021 1813    Acceptance, E, VU by ROMAIN at 3/16/2021 1252    Comment: Pt educated on OT role, OT plan of care, and  safety techniques during functional moblity and ADLs.   Family Acceptance, E,TB,H, VU by MS at 3/16/2021 1813                               User Key     Initials Effective Dates Name Provider Type Discipline    ROMAIN 09/14/20 -  Rayo Vallejo, OT Occupational Therapist OT    MS 11/15/19 -  Arjun De La Torre, RN Registered Nurse Nurse    BT 11/16/20 -  Tammy Cee OT Occupational Therapist OT              OT Recommendation and Plan  Retired Outcome Summary/Treatment Plan (OT)  Anticipated Discharge Disposition (OT): home with assist  Plan of Care Review  Plan of Care Reviewed With: patient  Outcome Summary: Pt appears much better this date. Pt ambulated to/from bathroom with SBA-supervision. Pt indep to don/doff socks from seated position. Pt reports limited ADL deficits this date. Pt appears to have returned to baseline level of indep with self care and is planning to discharge home with support. OT will s/o at this time as skilled treatment is no longer warranted. OT recommends d/c home with assist.  Plan of Care Reviewed With: patient  Outcome Summary: Pt appears much better this date. Pt ambulated to/from bathroom with SBA-supervision. Pt indep to don/doff socks from seated position. Pt reports limited ADL deficits this date. Pt appears to have returned to baseline level of indep with self care and is planning to discharge home with support. OT will s/o at this time as skilled treatment is no longer warranted. OT recommends d/c home with assist.     Time Calculation:   Time Calculation- OT     Row Name 03/18/21 1527             Time Calculation- OT    OT Start Time  1311  -SARAY      OT Stop Time  1322  -SARAY      OT Time Calculation (min)  11 min  -SARAY      Total Timed Code Minutes- OT  11 minute(s)  -SARAY      OT Received On  03/18/21  -SARAY        User Key  (r) = Recorded By, (t) = Taken By, (c) = Cosigned By    Initials Name Provider Type    Sonal August, SERENA Occupational Therapist        Therapy Charges for  Today     Code Description Service Date Service Provider Modifiers Qty    [id number] HC OT SELF CARE/MGMT/TRAIN EA 15 MIN 3/18/2021 Sonal Jimenez OT GO 1               Sonal Jimenez OT  3/18/2021

## 2021-03-18 NOTE — PROGRESS NOTES
[facility] INPATIENT PROGRESS NOTE         [address]    3/18/2021      PATIENT IDENTIFICATION:  Name: Jose R Dalton ADMIT: 3/7/2021   : 1960  PCP: Provider, No Known    MRN: [id number] LOS: 11 days   AGE/SEX: 60 y.o. male  ROOM: [facility]                     LOS 11    Reason for visit: Aspiration pneumonia with acute stroke      SUBJECTIVE:        Resting comfortably.  Calm and cooperative.  Working on rehab placement.  Awaiting further input from CCP.    Objective   OBJECTIVE:    Vital Sign Min/Max for last 24 hours  Temp  Min: 97.3 °F (36.3 °C)  Max: 99.9 °F (37.7 °C)   BP  Min: 111/75  Max: 156/94   Pulse  Min: 62  Max: 87   Resp  Min: 16  Max: 20   SpO2  Min: 94 %  Max: 100 %   No data recorded   No data recorded                   FiO2 (%): (!) 20 %     Body mass index is 32.9 kg/m².    Intake/Output Summary (Last 24 hours) at 3/18/2021 1026  Last data filed at 3/17/2021 2020  Gross per 24 hour   Intake 240 ml   Output --   Net 240 ml         Exam:  GEN:  No distress, appears stated age.    EYES:   PERRL, anicteric sclera  ENT:    External ears/nose normal, OP clear  NECK:  No adenopathy, midline trachea  LUNGS: Normal chest on inspection, palpation and auscultation  CV:  Normal S1S2, without murmur  ABD:  Non tender, non distended, no hepatosplenomegaly, +BS  EXT:  No edema, cyanosis or clubbing    Assessment     Scheduled meds:  amLODIPine, 10 mg, Oral, Q24H  aspirin, 81 mg, Oral, Daily  atorvastatin, 80 mg, Oral, Nightly  chlorhexidine, 15 mL, Mouth/Throat, Q12H  cloNIDine, 0.3 mg, Oral, Q8H  hydrALAZINE, 25 mg, Oral, Q8H  nicotine, 1 patch, Transdermal, Q24H  QUEtiapine, 50 mg, Oral, Q12H  ticagrelor, 90 mg, Oral, Daily      IV meds:                         Data Review:  Results from last 7 days   Lab Units 21  0510 21  1311 21  0607 21  0534 03/15/21  0841 21  0756   SODIUM mmol/L 143  --  147* 156* 153* 150*   POTASSIUM mmol/L 3.5 3.7 3.4* 3.7 3.2* 4.0    CHLORIDE mmol/L 105  --  111* 119* 115* 116*   CO2 mmol/L 27.2  --  24.9 25.9 24.0 22.8   BUN mg/dL 20  --  28* 40* 40* 38*   CREATININE mg/dL 1.25  --  1.29* 1.35* 1.38* 0.98   GLUCOSE mg/dL 81  --  131* 110* 108* 146*   CALCIUM mg/dL 9.0  --  8.6 8.9 9.3 8.9         Estimated Creatinine Clearance: 83.8 mL/min (by C-G formula based on SCr of 1.25 mg/dL).  Results from last 7 days   Lab Units 03/14/21  0756 03/12/21  0813   WBC 10*3/mm3 16.99* 10.31   HEMOGLOBIN g/dL 10.4* 11.1*   PLATELETS 10*3/mm3 195 167             Results from last 7 days   Lab Units 03/12/21  1201 03/12/21  0418   PH, ARTERIAL pH units 7.525* 7.459*   PO2 ART mm Hg 91.6 77.7*   PCO2, ARTERIAL mm Hg 30.7* 34.1*   HCO3 ART mmol/L 25.3 24.2             Chest x-ray 3/12/2021 reviewed: No acute          Microbiology reviewed  )        Active Hospital Problems    Diagnosis  POA   • Cerebrovascular accident (CVA) (CMS/[facility]) [I63.9]  Yes      Resolved Hospital Problems   No resolved problems to display.         ASSESSMENT:    1. Aspiration pneumonia  2. Acute stroke likely secondary to left carotid occlusion status post TPA and stenting  3. History of right MCA stroke previously  4. HTN  5. HLD  6. Seizure history  7. Smoker  8. H/o Polysubstance abuse  9. MARIO  10. Metabolic acidosis improved  11. Leukocytosis  12. Hypernatremia    PLAN:    Much less confused.  Calm and cooperative.  Continue current anticoagulation per neurology recommendations on high-dose statin for secondary stroke prevention.  Control blood pressure.  Goal less than 140 systolic.    Control glucose.  Dysphagia diet.  PT/ST/OT  CCP working on rehab placement.  Awaiting pre-CERT.      Tariq Boone MD  Pulmonary and Critical Care Medicine  [facility], [facility]  3/18/2021    10:26 EDT

## 2021-03-18 NOTE — PROGRESS NOTES
Adult Nutrition  Assessment/PES    Patient Name:  Jose R Dalton  YOB: 1960  MRN: [id number]  Admit Date:  3/7/2021    Assessment Date:  3/18/2021     Nutrition F/U:  Patient sleeping at the time of visit.  RN reports patient with good appetite, 100% at breakfast this morning. Diet advanced to Dysphagia III, pureed with some mashed and thin liquids (3/16).  SLP to evaluate for possible diet advancement. RD to follow for tolerance and adequate intake.     Reason for Assessment     Row Name 03/18/21 1115          Reason for Assessment    Reason For Assessment  follow-up protocol         Nutrition/Diet History     Row Name 03/18/21 1115          Nutrition/Diet History    Typical Food/Fluid Intake  Patient sleeping at the time of visit.  RN reports patient with good appetite, 100% at breakfast this morning.  SLP to evaluate for possible diet advancement.     Factors Affecting Nutritional Intake  difficulty/impaired swallowing           Labs/Tests/Procedures/Meds     Row Name 03/18/21 1116          Labs/Procedures/Meds    Lab Results Reviewed  reviewed        Diagnostic Tests/Procedures    Diagnostic Test/Procedure Reviewed  reviewed, pertinent        Medications    Pertinent Medications Reviewed  reviewed     Pertinent Medications Comments  Lipitor, Seroquel,         Physical Findings     Row Name 03/18/21 1117          Physical Findings    Overall Physical Appearance  obese     Skin  other (see comments) B: 18, Right groin puncture           Evaluation of Received Nutrient/Fluid Intake     Row Name 03/18/21 1118          PO Evaluation    Number of Meals  1     % PO Intake  100           Problem/Interventions:      Intervention Goal     Row Name 03/18/21 1118          Intervention Goal    General  Maintain nutrition;Reduce/improve symptoms;Meet nutritional needs for age/condition;Disease management/therapy     PO  Maintain intake;PO intake (%)     PO Intake %  --      Weight  No significant weight  loss         Nutrition Intervention     Row Name 03/18/21 1119          Nutrition Intervention    RD/Tech Action  Follow Tx progress;Care plan reviewd           Education/Evaluation     Row Name 03/18/21 1119          Education    Education  Will Instruct as appropriate        Monitor/Evaluation    Monitor  Per protocol;PO intake;Pertinent labs     Education Follow-up  Reinforce PRN           Electronically signed by:  Elizabeth Moon RD  03/18/21 11:19 EDT

## 2021-03-19 VITALS
TEMPERATURE: 97.5 F | RESPIRATION RATE: 17 BRPM | OXYGEN SATURATION: 100 % | HEART RATE: 79 BPM | DIASTOLIC BLOOD PRESSURE: 80 MMHG | HEIGHT: 73 IN | WEIGHT: 249.34 LBS | SYSTOLIC BLOOD PRESSURE: 124 MMHG | BODY MASS INDEX: 33.05 KG/M2

## 2021-03-19 LAB
ALBUMIN SERPL-MCNC: 3.7 G/DL (ref 3.5–5.2)
ANION GAP SERPL CALCULATED.3IONS-SCNC: 10.1 MMOL/L (ref 5–15)
BUN SERPL-MCNC: 15 MG/DL (ref 6–20)
BUN/CREAT SERPL: 13.5 (ref 7–25)
CALCIUM SPEC-SCNC: 8.4 MG/DL (ref 8.6–10.5)
CHLORIDE SERPL-SCNC: 104 MMOL/L (ref 98–107)
CO2 SERPL-SCNC: 24.9 MMOL/L (ref 22–29)
CREAT SERPL-MCNC: 1.11 MG/DL (ref 0.76–1.27)
GFR SERPL CREATININE-BSD FRML MDRD: 82 ML/MIN/1.73
GLUCOSE SERPL-MCNC: 102 MG/DL (ref 65–99)
PHOSPHATE SERPL-MCNC: 3.2 MG/DL (ref 2.5–4.5)
POTASSIUM SERPL-SCNC: 3.2 MMOL/L (ref 3.5–5.2)
SODIUM SERPL-SCNC: 139 MMOL/L (ref 136–145)

## 2021-03-19 PROCEDURE — 97110 THERAPEUTIC EXERCISES: CPT

## 2021-03-19 PROCEDURE — 80069 RENAL FUNCTION PANEL: CPT | Performed by: INTERNAL MEDICINE

## 2021-03-19 RX ORDER — ATORVASTATIN CALCIUM 80 MG/1
80 TABLET, FILM COATED ORAL NIGHTLY
Qty: 30 TABLET | Refills: 0 | Status: SHIPPED | OUTPATIENT
Start: 2021-03-19

## 2021-03-19 RX ORDER — CLONIDINE HYDROCHLORIDE 0.3 MG/1
0.3 TABLET ORAL EVERY 8 HOURS SCHEDULED
Qty: 90 TABLET | Refills: 0 | Status: SHIPPED | OUTPATIENT
Start: 2021-03-19

## 2021-03-19 RX ORDER — HYDRALAZINE HYDROCHLORIDE 25 MG/1
25 TABLET, FILM COATED ORAL EVERY 8 HOURS SCHEDULED
Qty: 90 TABLET | Refills: 0 | Status: SHIPPED | OUTPATIENT
Start: 2021-03-19

## 2021-03-19 RX ORDER — QUETIAPINE FUMARATE 50 MG/1
25 TABLET, FILM COATED ORAL NIGHTLY
Qty: 30 TABLET | Refills: 0 | Status: SHIPPED | OUTPATIENT
Start: 2021-03-19

## 2021-03-19 RX ORDER — AMLODIPINE BESYLATE 10 MG/1
10 TABLET ORAL
Qty: 30 TABLET | Refills: 0 | Status: SHIPPED | OUTPATIENT
Start: 2021-03-19

## 2021-03-19 RX ADMIN — HYDRALAZINE HYDROCHLORIDE 25 MG: 25 TABLET, FILM COATED ORAL at 05:20

## 2021-03-19 RX ADMIN — QUETIAPINE FUMARATE 50 MG: 50 TABLET, FILM COATED ORAL at 09:01

## 2021-03-19 RX ADMIN — Medication 1 PATCH: at 09:02

## 2021-03-19 RX ADMIN — ASPIRIN 81 MG: 81 TABLET, CHEWABLE ORAL at 09:01

## 2021-03-19 RX ADMIN — CLONIDINE HYDROCHLORIDE 0.3 MG: 0.1 TABLET ORAL at 05:20

## 2021-03-19 RX ADMIN — HYDRALAZINE HYDROCHLORIDE 25 MG: 25 TABLET, FILM COATED ORAL at 14:59

## 2021-03-19 RX ADMIN — AMLODIPINE BESYLATE 10 MG: 10 TABLET ORAL at 09:01

## 2021-03-19 RX ADMIN — CLONIDINE HYDROCHLORIDE 0.3 MG: 0.1 TABLET ORAL at 14:59

## 2021-03-19 RX ADMIN — TICAGRELOR 90 MG: 90 TABLET ORAL at 09:01

## 2021-03-19 NOTE — PLAN OF CARE
Goal Outcome Evaluation:  Plan of Care Reviewed With: patient  Progress: improving     Pt A+O x4, VSS. NIHSS consistent. He had family at bedside all shift and slept well. Pt has been very pleasant. He is eager to be discharged. Pending PT session and discharge soon. I will continue to monitor and progress towards goals as tolerated.

## 2021-03-19 NOTE — THERAPY TREATMENT NOTE
Patient Name: Jose R Dalton  : 1960    MRN: [id number]                              Today's Date: 3/19/2021       Admit Date: 3/7/2021    Visit Dx:     ICD-10-CM ICD-9-CM   1. Cerebrovascular accident (CVA), unspecified mechanism (CMS/HCC)  I63.9 434.91     Patient Active Problem List   Diagnosis   • Cerebrovascular accident (CVA) (CMS/HCC)     Past Medical History:   Diagnosis Date   • Elevated cholesterol    • Hypertension    • Obesity    • Seizure (CMS/HCC)    • Seizures (CMS/HCC)    • Stroke (CMS/HCC)      Past Surgical History:   Procedure Laterality Date   • CAROTID ENDARTERECTOMY Right    • CAROTID ENDARTERECTOMY Right    • EMBOLECTOMY N/A 3/7/2021    Procedure: MECHANICAL EMBOLECTOMY, WITH LEFT CAROTID ARTERY ANGIOPLASTY AND STENT PLACEMENT;  Surgeon: Nick Washington MD;  Location: [facility] ;  Service: Neurosurgery;  Laterality: N/A;     General Information     Row Name 21 153          Physical Therapy Time and Intention    Document Type  therapy note (daily note)  -MS     Mode of Treatment  physical therapy;individual therapy  -MS     Row Name 21 153          General Information    Patient Profile Reviewed  yes  -MS     Existing Precautions/Restrictions  fall  -MS     Barriers to Rehab  none identified  -MS     Row Name 21 1532          Safety Issues, Functional Mobility    Comment, Safety Issues/Impairments (Mobility)  Gait belt used for safety.  -MS       User Key  (r) = Recorded By, (t) = Taken By, (c) = Cosigned By    Initials Name Provider Type    MS Dennis Mckeon, PT Physical Therapist        Mobility     Row Name 21 1533          Bed Mobility    Comment (Bed Mobility)  Up on EOB sitting independently  -MS     Row Name 21 1533          Sit-Stand Transfer    Sit-Stand Shorter (Transfers)  independent  -MS     Row Name 21 1533          Gait/Stairs (Locomotion)    Shorter Level (Gait)  standby assist  -MS     Distance in Feet  (Gait)  200 feet  -MS     Deviations/Abnormal Patterns (Gait)  brian decreased  -MS     [place] Level (Stairs)  stand by assist  -MS     Handrail Location (Stairs)  left side (ascending)  -MS     Number of Steps (Stairs)  12  -MS     Ascending Technique (Stairs)  step-over-step  -MS     Descending Technique (Stairs)  step-over-step  -MS       User Key  (r) = Recorded By, (t) = Taken By, (c) = Cosigned By    Initials Name Provider Type    Dennis Hurley, PT Physical Therapist        Obj/Interventions     Row Name 03/19/21 1534          Motor Skills    Therapeutic Exercise  -- BLE Standing ther. ex. program x 10 reps completed (Heel Raises, Mini-squats, Hip Abduction)  -MS       User Key  (r) = Recorded By, (t) = Taken By, (c) = Cosigned By    Initials Name Provider Type    Dennis Hurley, PT Physical Therapist        Goals/Plan    No documentation.       Clinical Impression     Row Name 03/19/21 1535          Pain    Additional Documentation  Pain Scale: Numbers Pre/Post-Treatment (Group)  -MS     Row Name 03/19/21 1535          Pain Scale: Numbers Pre/Post-Treatment    Pretreatment Pain Rating  0/10 - no pain  -MS     Posttreatment Pain Rating  0/10 - no pain  -MS     Row Name 03/19/21 1535          Positioning and Restraints    Pre-Treatment Position  in bed  -MS     Post Treatment Position  bed  -MS     In Bed  notified nsg;sitting EOB;call light within reach;encouraged to call for assist;with family/caregiver  -MS       User Key  (r) = Recorded By, (t) = Taken By, (c) = Cosigned By    Initials Name Provider Type    Dennis Hurley, PT Physical Therapist        Outcome Measures     Row Name 03/19/21 1536          How much help from another person do you currently need...    Turning from your back to your side while in flat bed without using bedrails?  4  -MS     Moving from lying on back to sitting on the side of a flat bed without bedrails?  3  -MS     Moving to and from a bed to a chair  (including a wheelchair)?  3  -MS     Standing up from a chair using your arms (e.g., wheelchair, bedside chair)?  3  -MS     Climbing 3-5 steps with a railing?  3  -MS     To walk in hospital room?  3  -MS     AM-PAC 6 Clicks Score (PT)  19  -MS     Row Name 03/19/21 1536          Functional Assessment    Outcome Measure Options  AM-PAC 6 Clicks Basic Mobility (PT)  -MS       User Key  (r) = Recorded By, (t) = Taken By, (c) = Cosigned By    Initials Name Provider Type    Dennis Hurley, PT Physical Therapist        Physical Therapy Education                 Title: PT OT SLP Therapies (Done)     Topic: Physical Therapy (Resolved)     Point: Mobility training (Resolved)     Learning Progress Summary           Patient Acceptance, E,D, VU by MS at 3/19/2021 1537    Acceptance, E,D, VU,NR by MS at 3/18/2021 1524    Acceptance, E,TB, VU by MS1 at 3/17/2021 1826    Acceptance, E,D, VU,NR by MG at 3/17/2021 1203    Acceptance, E,TB,H, VU by MS1 at 3/16/2021 1813    Acceptance, E,D, NR,DU by MG at 3/16/2021 1655    Acceptance, E,D, NR by MG at 3/15/2021 1221    Acceptance, E,TB,D, VU,NR by SV at 3/13/2021 1632   Family Acceptance, E,TB,H, VU by MS1 at 3/16/2021 1813                   Point: Home exercise program (Resolved)     Learning Progress Summary           Patient Acceptance, E,D, VU by MS at 3/19/2021 1537    Acceptance, E,D, VU,NR by MS at 3/18/2021 1524    Acceptance, E,TB, VU by MS1 at 3/17/2021 1826    Acceptance, E,D, VU,NR by MG at 3/17/2021 1203    Acceptance, E,TB,H, VU by MS1 at 3/16/2021 1813    Acceptance, E,D, NR,DU by MG at 3/16/2021 1655    Acceptance, E,D, NR by MG at 3/15/2021 1221    Acceptance, E,TB,D, VU,NR by SV at 3/13/2021 1632   Family Acceptance, E,TB,H, VU by MS1 at 3/16/2021 1813                               User Key     Initials Effective Dates Name Provider Type Discipline    MS 04/03/18 -  Dennis Mckeon, PT Physical Therapist PT     04/03/18 -  Krystina Beltrán, PT Physical  Therapist PT    MS1 11/15/19 -  Arjun De La Torre, RN Registered Nurse Nurse     02/26/21 -  Amara Jimenez PTA Student PTA Student PT              PT Recommendation and Plan     Plan of Care Reviewed With: patient  Outcome Summary: Pt. able to ambulate 400 feet, CGA x 1, with no use of A.D. this date.  Pt. requires SBA x 1 for sit <-> stand transfers.  BLE standing ther. ex. program x 10 reps completed for general strengthening. Unsteady at times during gait but no overt losses of balance.     Time Calculation:   PT Charges     Row Name 03/19/21 1539             Time Calculation    Start Time  1438  -MS      Stop Time  1450  -MS      Time Calculation (min)  12 min  -MS      PT Received On  03/19/21  -MS      PT - Next Appointment  03/20/21  -MS         Time Calculation- PT    Total Timed Code Minutes- PT  11 minute(s)  -MS        User Key  (r) = Recorded By, (t) = Taken By, (c) = Cosigned By    Initials Name Provider Type    Dennis Hurley, PT Physical Therapist        Therapy Charges for Today     Code Description Service Date Service Provider Modifiers Qty    31193454274 HC PT THER PROC EA 15 MIN 3/18/2021 Dennis Mckeon, PT GP 1    32922671455 HC PT THER PROC EA 15 MIN 3/19/2021 Dennis Mckeon, PT GP 1          PT G-Codes  Outcome Measure Options: AM-PAC 6 Clicks Basic Mobility (PT)  AM-PAC 6 Clicks Score (PT): 19  AM-PAC 6 Clicks Score (OT): 24  Modified [place] Scale: 3 - Moderate disability.  Requiring some help, but able to walk without assistance.    Dennis Mckeon, PT  3/19/2021

## 2021-03-19 NOTE — PLAN OF CARE
Goal Outcome Evaluation:  Plan of Care Reviewed With: patient     Pt. Able to ambulate 200 feet, SBA x1, with no use of A.D. this date. Pt. Is independent with sit <-> stand transfers and performed 12 stairs (ascending/descending), H.R. x 1, with SBA x1.  Pt. Performed BLE standing exercise program x 10 reps for general strengthening.

## 2021-03-19 NOTE — PROGRESS NOTES
Case Management Discharge Note      Final Note: Patient DC'd home         Selected Continued Care - Admitted Since 3/7/2021     Destination    No services have been selected for the patient.              Durable Medical Equipment    No services have been selected for the patient.              Dialysis/Infusion    No services have been selected for the patient.              Home Medical Care    No services have been selected for the patient.              Therapy    No services have been selected for the patient.              Community Resources    No services have been selected for the patient.                  Transportation Services  Private: Car    Final Discharge Disposition Code: 01 - home or self-care

## 2021-03-19 NOTE — DISCHARGE SUMMARY
PHYSICIAN DISCHARGE SUMMARY                                                                        [facility]    Date of admit: 3/7/2021  Date of Discharge:  3/19/2021    PCP: Provider, No Known    Discharge Diagnosis:     Cerebrovascular accident (CVA) (CMS/HCC)  1. Aspiration pneumonia  2. Acute stroke likely secondary to left carotid occlusion status post TPA and stenting  3. History of right MCA stroke previously  4. HTN  5. HLD  6. Seizure history  7. Smoker  8. H/o Polysubstance abuse  9. MARIO  10. Metabolic acidosis improved  Leukocytosis    Secondary Diagnoses:  Past Medical History:   Diagnosis Date   • Elevated cholesterol    • Hypertension    • Obesity    • Seizure (CMS/HCC)    • Seizures (CMS/HCC)    • Stroke (CMS/[facility])        Procedures Performed  Procedure(s):  MECHANICAL EMBOLECTOMY, WITH LEFT CAROTID ARTERY ANGIOPLASTY AND STENT PLACEMENT         Consults:       Hospital Course  Patient is a 60 y.o. male presented with per H&P:     CC: Altered mental status new neurological changes     History of Present Illness:  Patient is a 60-year-old who presents to the ICU status post TPA thrombectomy and stenting for an acute stroke.  He arrives in the ICU intubated.  Discussed with our stroke neurologist.  And I called ER physician and discussed with him as well.  My involvement in his care began around 330.  Patient is intubated unable to give me a reliable history however chart review and discussion with other physicians involved in his care the history that I can summarize as as below.     Patient has a history of a right MCA however per history obtained he was not on any medications.  He developed aphasia and right-sided weakness with left eye gaze deviation with altered level of consciousness.  A CT scan in the ER showed concern for stroke.  Patient was taken for the above interventions and presents to the ICU at present  time.        Patient was admitted by Dr. Rausch for acute stroke with left carotid occlusion and status post TPA and stenting.  Patient also had complications with aspiration pneumonia.  Has a history of polysubstance abuse and his hospital stay was prolonged due to delirium and agitation which has improved with Seroquel.  He has completed antibiotics with aspiration pneumonia.  He is on antiplatelet therapy as recommended by neurology.  He has been working with physical therapy and making significant improvement and no longer meets criteria for skilled nursing.  Continue high-dose statin for secondary stroke prevention.  Has been working with therapy and doing well.  Plan discharge home with follow-up with neurology and primary care.    Condition on Discharge:  Stable.      Vital Signs  Temp:  [97 °F (36.1 °C)-98.7 °F (37.1 °C)] 98.6 °F (37 °C)  Heart Rate:  [62-78] 70  Resp:  [16-18] 17  BP: (111-133)/(63-82) 126/70    Physical Exam:  General Appearance: no acute distress, appears comfortable  Heart: regular rate and rhythm  Lungs: clear to auscultation bilaterally, respirations unlabored  Abdomen: soft, nontender, nondistended, no guarding/rebound, nl BS  Extremeties: no edema, no cyanosis  Neurology: speech normal, mental status normal, moving all four extremities  Mental Status: alert, oriented        --  Consults:   IP CONSULT TO NEUROLOGY  IP CONSULT TO NEUROLOGY  IP CONSULT TO NEURO CLINICAL SPECIALIST  IP CONSULT TO REHAB ADMISSION  IP CONSULT TO CASE MANAGEMENT   IP CONSULT TO REHAB ADMISSION    Significant Discharge Diagnostics   Procedures Performed:  Procedure(s):  MECHANICAL EMBOLECTOMY, WITH LEFT CAROTID ARTERY ANGIOPLASTY AND STENT PLACEMENT       Pertinent Lab Results:  Results from last 7 days   Lab Units 03/19/21  0527 03/18/21  0510 03/17/21  1311 03/17/21  0607 03/16/21  0534 03/15/21  0841 03/14/21  0756 03/13/21  0900   SODIUM mmol/L 139 143  --  147* 156* 153* 150* 146*    POTASSIUM mmol/L 3.2* 3.5 3.7 3.4* 3.7 3.2* 4.0 3.9   CHLORIDE mmol/L 104 105  --  111* 119* 115* 116* 111*   CO2 mmol/L 24.9 27.2  --  24.9 25.9 24.0 22.8 21.1*   BUN mg/dL 15 20  --  28* 40* 40* 38* 29*   CREATININE mg/dL 1.11 1.25  --  1.29* 1.35* 1.38* 0.98 1.13   GLUCOSE mg/dL 102* 81  --  131* 110* 108* 146* 160*   CALCIUM mg/dL 8.4* 9.0  --  8.6 8.9 9.3 8.9 8.8         Results from last 7 days   Lab Units 03/14/21  0756 03/12/21  0813   WBC 10*3/mm3 16.99* 10.31   HEMOGLOBIN g/dL 10.4* 11.1*   HEMATOCRIT % 32.7* 34.3*   PLATELETS 10*3/mm3 195 167   MCV fL 82.8 79.6   MCH pg 26.3* 25.8*   MCHC g/dL 31.8 32.4   RDW % 14.3 14.3   RDW-SD fl 43.0 41.6   MPV fL 11.8 11.8   NEUTROPHIL % %  --  75.9   LYMPHOCYTE % %  --  9.9*   MONOCYTES % %  --  10.0   EOSINOPHIL % %  --  3.3   BASOPHIL % %  --  0.3   IMM GRAN % %  --  0.6*   NEUTROS ABS 10*3/mm3  --  7.83*   LYMPHS ABS 10*3/mm3  --  1.02   MONOS ABS 10*3/mm3  --  1.03*   EOS ABS 10*3/mm3  --  0.34   BASOS ABS 10*3/mm3  --  0.03   IMMATURE GRANS (ABS) 10*3/mm3  --  0.06*   NRBC /100 WBC  --  0.0             Results from last 7 days   Lab Units 03/12/21  0813   TRIGLYCERIDES mg/dL 117             Results from last 7 days   Lab Units 03/12/21  1201   PH, ARTERIAL pH units 7.525*   PO2 ART mm Hg 91.6   PCO2, ARTERIAL mm Hg 30.7*   HCO3 ART mmol/L 25.3                                   Imaging Results:  Imaging Results (All)     Procedure Component Value Units Date/Time    XR Chest 1 View [[id number]] Collected: 03/12/21 1646     Updated: 03/12/21 1652    Narrative:      PORTABLE CHEST 03/12/2021 AT 4:36 PM     CLINICAL HISTORY: Anxiety. Hypertension. Acute CVA.     Compared to the previous chest performed earlier today at 0220 hours.     In the interval, the endotracheal tube and nasogastric feeding tube have  been removed. The lungs are not quite as well inflated as on the  previous chest x-ray, but remain free of infiltrates. There are no  pleural effusions. The  heart is normal in size.     IMPRESSIONS: No evidence of acute disease within the chest.     This report was finalized on 3/12/2021 4:49 PM by Dr. Humberto Olivarez M.D.       XR Chest 1 View [[id number]] Collected: 03/12/21 0344     Updated: 03/12/21 0348    Narrative:      SINGLE VIEW OF THE CHEST     HISTORY: Ventilator management     COMPARISON: 03/11/2021     FINDINGS:  Tubes and lines are stable in position. Mild cardiomegaly is present.  There is no vascular congestion. There is mild bibasilar atelectasis.       Impression:      No significant interval change.     This report was finalized on 3/12/2021 3:45 AM by Dr. Verónica Robins M.D.       XR Chest 1 View [[id number]] Collected: 03/11/21 0507     Updated: 03/11/21 0511    Narrative:      SINGLE VIEW OF THE CHEST     HISTORY: Ventilator management     COMPARISON: 03/10/2021     FINDINGS:  Tubes and lines are stable in position. There is mild cardiomegaly.  There is no vascular congestion. No pneumothorax or pleural effusion is  seen. Aeration at the lung bases appears improved.       Impression:      Improving aeration at the lung bases.     This report was finalized on 3/11/2021 5:08 AM by Dr. Verónica Robins M.D.       MRI Brain Without Contrast [[id number]] Collected: 03/09/21 1527     Updated: 03/10/21 1406    Narrative:      MRI EXAMINATION BRAIN WITHOUT CONTRAST     HISTORY: Stroke, follow-up.     COMPARISON: CT head 03/09/2021.     TECHNIQUE: A MRI examination of the brain was performed before and after  the intravenous administration of contrast utilizing sagittal T1, axial  diffusion, T1, T2, T2 FLAIR, as well as gradient echo T2 imaging.     FINDINGS: Areas of acute infarction are identified involving the left  parietal lobe laterally and superior laterally. The more posterior  infarct measures approximately 3 x 4 cm in size. The anterior infarct  measures approximately 3.7 x 1.8 cm in size and involves the  parietotemporal region, as well as  extending to and involving the  posterior aspect of the operculum.     Remote infarcts involving the right parietooccipital region are noted  laterally and superolaterally on the right.     Hydrocephalus or midline shift.     There is expected flow-void in the basilar artery and in the distal  aspect of the internal carotid arteries bilaterally on the axial T2  sequence.       Impression:      1.  There are acute infarcts involving the left MCA vascular  distribution with infarction involving the left parietal lobe, as well  as the parietotemporal and posterior frontal region. The largest  involves the left parietal lobe where the area of infarction measures  approximately 3 x 4 cm in transverse dimension.  2.  Remote infarcts involving the parietooccipital region on the right  laterally and superiorly laterally are noted.     This report was finalized on 3/10/2021 2:03 PM by Dr. Mehdi Castro M.D.       XR Chest 1 View [[id number]] Collected: 03/10/21 0525     Updated: 03/10/21 0529    Narrative:      SINGLE VIEW OF THE CHEST     HISTORY: Ventilator management     COMPARISON: 03/09/2021     FINDINGS:  Endotracheal tube terminates in satisfactory position. Weighted enteric  feeding tube extends into the upper abdomen. There is cardiomegaly.  There is no vascular congestion. There is bibasilar atelectasis. No  pneumothorax or pleural effusion is seen.       Impression:      No significant interval change.     This report was finalized on 3/10/2021 5:26 AM by Dr. Verónica Robins M.D.       XR Chest 1 View [[id number]] Collected: 03/09/21 1327     Updated: 03/09/21 1332    Narrative:      XR CHEST 1 VW-     HISTORY: Male who is 60 years-old,  on ventilator     TECHNIQUE: Frontal view of the chest     COMPARISON: 03/07/2021     FINDINGS: Stable appearing endotracheal tube. NG tube is seen to extend  at least as far as the stomach. The heart is enlarged. Pulmonary  vasculature appears mildly less congested. Aeration  of the left upper  lung appears improved. No focal pulmonary consolidation or pneumothorax.  Suggestion of small right pleural effusion. Otherwise stable.       Impression:      Partial improvement, continued follow-up suggested.     This report was finalized on 3/9/2021 1:29 PM by Dr. Carlos Izquierdo M.D.       CT Head Without Contrast [[id number]] Collected: 03/09/21 0457     Updated: 03/09/21 0508    Narrative:      CT HEAD WITHOUT CONTRAST     HISTORY: Stroke     COMPARISON: 03/18/2021     TECHNIQUE: Axial CT imaging was obtained through the brain. No IV  contrast was administered.     FINDINGS:  There are areas of decreased attenuation which are seen within the left  MCA territory, in keeping with evolving infarcts. These appear grossly  stable when compared to yesterday's exam. There is no evidence of  hemorrhagic transformation. There is mass effect upon the left lateral  ventricle. There is no significant midline shift. There is effacement of  the sulci and gyri within the areas of concern. Large area of  encephalomalacia is again noted within the right MCA territory. Mucosal  thickening is seen within the ethmoid sinuses. Mastoid air cells appear  clear.       Impression:         1. Evolving infarcts again noted within the left MCA territory, with  associated mass effect, although there is no significant midline shift.  There is no hemorrhagic transformation at this time. MRI would be more  sensitive for assessment of full extent of areas of infarction.     Radiation dose reduction techniques were utilized, including automated  exposure control and exposure modulation based on body size.     This report was finalized on 3/9/2021 5:05 AM by Dr. Verónica Robins M.D.       CT Head Without Contrast [[id number]] Collected: 03/08/21 0726     Updated: 03/08/21 1655    Narrative:      CT HEAD WITHOUT CONTRAST     HISTORY: Stroke, follow-up.     COMPARISON: Comparison is made to multiple prior CT examinations  of the  brain with most recent examination being the CT examination performed on  03/07/2021.     FINDINGS: An area of encephalomalacia involving the frontoparietal  region on the right laterally, extending to and involving the  parietooccipital region is appreciated, consistent with a remote right  MCA infarct. There is volume loss with secondary enlargement of the  right lateral ventricle. There is no evidence of intracranial  hemorrhage. There is subtle decreased attenuation involving the left  frontal lobe laterally and superolaterally with sagittal modulation of  gray-white matter suspicious for a small acute infarcts these are new  versus the CT angiogram of 03/07/2021. Further evaluation could be  performed with a MRI examination of brain. The above information has  been communicated to the clinical service.           Radiation dose reduction techniques were utilized, including automated  exposure control and exposure modulation based on body size.     This report was finalized on 3/8/2021 4:52 PM by Dr. Mehdi Castro M.D.       CT Head Without Contrast [[id number]] Collected: 03/08/21 1258     Updated: 03/08/21 1332    Narrative:      CT HEAD WO CONTRAST-     CLINICAL HISTORY: Aphasia and right hemiparesis. Left internal carotid  occlusion shown on CTA with evidence of left MCA territory ischemia.  Status post TPA. Follow-up.     TECHNIQUE: Transverse 3 mm thick images were obtained from the base of  the skull to the vertex without IV contrast.     Radiation dose reduction techniques were utilized, including automated  exposure control and exposure modulation based on body size.     COMPARISON: CT scan of the head performed earlier today at 0356 hours     FINDINGS: There are patchy areas of ill-defined abnormal diminished  attenuation and also obscuration of the gray-white junction involving  the posterior left frontal region and also the left frontal lobe that  are more prominent than on the CT head  performed earlier today, and are  new since the CT dated 03/07/2021. These are consistent with developing  areas of acute ischemic infarction. There is mild effacement of cortical  sulci in the left parietal lobe. There is no midline shift. There is no  evidence of hemorrhagic transformation. A large area of chronic  infarction involving the right MCA vascular territory is unchanged. Ex  vacuo dilatation of the right lateral ventricle is also unchanged.       Impression:      Patchy subtle areas of abnormal diminished attenuation and  also obscuration of the gray-white junction in the left cerebral  hemisphere consistent with developing areas of acute ischemic infarction  that are new since the CT performed yesterday. There is no significant  associated mass effect. There is no evidence of hemorrhagic  transformation. A large chronic infarct in the right cerebral hemisphere  is unchanged since the CTs performed earlier today and yesterday.     Patient's nurse was informed that a completed corrected report was  available for review on the electronic medical record system on  03/08/2021 at 1:30 PM     This report was finalized on 3/8/2021 1:29 PM by Dr. Humberto Olivarez M.D.       XR Abdomen KUB [[id number]] Collected: 03/07/21 1927     Updated: 03/07/21 1927    Narrative:        Patient: YOHAN NOWAK  Time Out: 19:25  Exam(s): FILM ABDOMEN     EXAM:    XR Abdomen, 2 Views    CLINICAL HISTORY:     Reason for exam: cortrak placement.    TECHNIQUE:    Frontal view of the abdomen pelvis with upright view of the abdomen.    COMPARISON:    None    FINDINGS:    Hardware: An enteric tube terminates in the region of the stomach.      Abdomen: Nonobstructive partially visualized bowel gas pattern. No free   air.      Bones: Normal.      Soft tissues: Normal.      Lower chest: Normal.      Other: Excreting contrast in the renal collecting systems.    IMPRESSION:       An enteric tube terminates in the region of the stomach.       Impression:          Electronically signed by Ulises Quiñonez M.D. on 03-07-21 at 1925    CT Head Without Contrast [[id number]] Collected: 03/07/21 1818     Updated: 03/07/21 1818    Narrative:        Patient: YOHAN NOWAK  Time Out: 18:17  Exam(s): CT HEAD Without Contrast     EXAM:    CT Head Without Intravenous Contrast    CLINICAL HISTORY:     Reason for exam: Stroke, follow up.    TECHNIQUE:    Axial computed tomography images of the head brain without intravenous   contrast.  CTDI is 54.8 mGy and DLP is 1040.20 mGy-cm.  This CT exam was   performed according to the principle of ALARA (As Low As Reasonably   Achievable) by using one or more of the following dose reduction   techniques: automated exposure control, adjustment of the mA and or kV   according to patient size, and or use of iterative reconstruction   technique.    COMPARISON:    CT head on 3 7 2021 at 1042 hrs.    FINDINGS:    Brain: No acute infarct or hemorrhage identified. No extra-axial fluid   collection. No mass effect or midline shift. Scattered areas of   hypoattenuation in the supratentorial white matter likely represent   chronic small vessel ischemic changes.  Stable encephalomalacia in the   right MCA distribution.  Stable small remote lacunar infarct in the right   caudate head.      Ventricles and sulci: Prominence of the ventricles and sulci is likely   secondary to cerebral volume loss.  Ex vacuo dilatation of the right   lateral ventricle.      Bones:  Normal. No bony lesion or acute fracture.      Subcutaneous tissues: Normal.      Sinuses: Mild mucosal thickening in the maxillary sinuses, sphenoid   sinuses, and ethmoid air cells.      Mastoid air cells: Normal.      Orbits: Grossly unremarkable.      Other: Atherosclerotic calcifications in the intracranial vasculature.    Nasogastric tube partially visualized.  Fluid in the nasal cavity and   nasopharynx    IMPRESSION:     1.  No acute intracranial abnormality.  2.  Chronic small  vessel ischemic changes and cerebral volume loss.    Stable encephalomalacia in the right MCA distribution.  Stable small   remote lacunar infarct in the right caudate head.      Impression:          Electronically signed by Ulises Quiñonez M.D. on 03-07-21 at 1817    CT Chest Without Contrast Diagnostic [[id number]] Collected: 03/07/21 1814     Updated: 03/07/21 1814    Narrative:        Patient: YOHAN NOWAK  Time Out: 18:13  Exam(s): CT CHEST Without Contrast     EXAM:    CT Chest Without Intravenous Contrast    CLINICAL HISTORY:     Reason for exam: ?mass abnormal cxr.    TECHNIQUE:    Axial computed tomography images of the chest without intravenous   contrast.  CTDI is 23.6 mGy and DLP is 897.20 mGy-cm.  This CT exam was   performed according to the principle of ALARA (As Low As Reasonably   Achievable) by using one or more of the following dose reduction   techniques: automated exposure control, adjustment of the mA and or kV   according to patient size, and or use of iterative reconstruction   technique.    COMPARISON:    None    FINDINGS:    Lungs:  Dependent consolidations may represent combination of   atelectasis, pneumonia, and edema.  Groundglass opacities bilaterally may   represent edema and or pneumonia.  Punctate calcified granuloma in the   left lower lobe.  Mild paraseptal emphysematous changes of the right.    Pleural space:  Small bilateral pleural effusions.  No pneumothorax.    Heart:  Unremarkable.  No cardiomegaly.  No significant pericardial   effusion.    Mediastinum:  Nonspecific mildly prominent mediastinal lymph nodes.    Bones joints:  Mild degenerative changes of the spine.  No acute   fracture.  No dislocation.    Soft tissues:  Unremarkable.    Vasculature:  Unremarkable.  No thoracic aortic aneurysm.    Lymph nodes:  Unremarkable.  No enlarged lymph nodes.    Kidneys and ureters:  Left renal cyst partially visualized.  Probable   excreting contrast in the renal collecting systems.     Tubes, lines and devices:  Enteric tube terminates in the stomach.    Endotracheal tube terminates in the mid thoracic trachea.    IMPRESSION:       1.  Small bilateral pleural effusions.  2.  Dependent consolidations may represent combination of atelectasis,   pneumonia, and edema.  Groundglass opacities bilaterally may represent   edema and or pneumonia.      Impression:          Electronically signed by Ulises Quiñonez M.D. on 03-07-21 at 1813    Arteriogram (Autofinalize) [[id number]] Resulted: 03/07/21 1559     Updated: 03/07/21 1559    Narrative:      This procedure was auto-finalized with no dictation required.    CT Angiogram Head [[id number]] Collected: 03/07/21 1126     Updated: 03/07/21 1319    Narrative:      CT ANGIOGRAPHY OF THE HEAD AND NECK WITHOUT AND WITH INTRAVENOUS  CONTRAST AND 3-D RECONSTRUCTIONS AND COLOR CT PERFUSION IMAGING OF THE  BRAIN WITH INTRAVENOUS CONTRAST     HISTORY: Recent onset of severe right-sided weakness. Aphasia. Team D  evaluation.     TECHNIQUE: The CT scan was performed as an emergency procedure through  the head and neck with CT angiography protocol without and with  intravenous contrast and 3-D reconstructions followed by color CT  perfusion imaging of the brain with contrast.      FINDINGS: The following findings are present:  1. An initial noncontrast CT scan of the brain was performed. There is a  large area of old infarction in the right temporoparietal region similar  to the study of 02/24/2016. There is no evidence of intracranial  hemorrhage or mass effect. The findings of the noncontrast CT scan were  communicated to the team D physician when imaging made available at  10:50 AM. The postcontrast findings were communicated when imaging made  available at 11:06 AM.  2. Evaluation for stenosis is based on NASCET criteria. The vertebral  arteries are patent with slight dominance of the right vertebral artery.  The left vertebral artery is quite small in caliber and  filling of the  basilar artery is predominantly via the right vertebral artery. The  right posterior communicating artery is patent.  3. The right cervical carotid artery shows no stenosis. The left  cervical carotid artery shows complete occlusion just beyond the origin  of the left internal carotid artery and I suspect this represents acute  occlusion. There is some faint opacification of the internal carotid  artery along the carotid canal at the base of skull.  4. The intracranial CT angiography shows some decreased opacification of  the left carotid siphon compared to the right. The anterior  communicating artery is patent and the right posterior communicating  artery is patent and therefore much of the left-sided circulation is  supplied from the right. No intracranial stenosis or thrombosis is  identified. There is no evidence of aneurysm.  5. The color CT perfusion imaging shows no abnormal cerebral blood flow  less than 30% by volume. However there is a large area of Tmax greater  than 6 seconds by volume located in the left MCA territory. This has a  volume of 55 mL.                 Radiation dose reduction techniques were utilized, including automated  exposure control and exposure modulation based on body size.     This report was finalized on 3/7/2021 1:16 PM by Dr. Macho Clifford M.D.       CT Angiogram Neck [[id number]] Collected: 03/07/21 1126     Updated: 03/07/21 1319    Narrative:      CT ANGIOGRAPHY OF THE HEAD AND NECK WITHOUT AND WITH INTRAVENOUS  CONTRAST AND 3-D RECONSTRUCTIONS AND COLOR CT PERFUSION IMAGING OF THE  BRAIN WITH INTRAVENOUS CONTRAST     HISTORY: Recent onset of severe right-sided weakness. Aphasia. Team D  evaluation.     TECHNIQUE: The CT scan was performed as an emergency procedure through  the head and neck with CT angiography protocol without and with  intravenous contrast and 3-D reconstructions followed by color CT  perfusion imaging of the brain with contrast.       FINDINGS: The following findings are present:  1. An initial noncontrast CT scan of the brain was performed. There is a  large area of old infarction in the right temporoparietal region similar  to the study of 02/24/2016. There is no evidence of intracranial  hemorrhage or mass effect. The findings of the noncontrast CT scan were  communicated to the team D physician when imaging made available at  10:50 AM. The postcontrast findings were communicated when imaging made  available at 11:06 AM.  2. Evaluation for stenosis is based on NASCET criteria. The vertebral  arteries are patent with slight dominance of the right vertebral artery.  The left vertebral artery is quite small in caliber and filling of the  basilar artery is predominantly via the right vertebral artery. The  right posterior communicating artery is patent.  3. The right cervical carotid artery shows no stenosis. The left  cervical carotid artery shows complete occlusion just beyond the origin  of the left internal carotid artery and I suspect this represents acute  occlusion. There is some faint opacification of the internal carotid  artery along the carotid canal at the base of skull.  4. The intracranial CT angiography shows some decreased opacification of  the left carotid siphon compared to the right. The anterior  communicating artery is patent and the right posterior communicating  artery is patent and therefore much of the left-sided circulation is  supplied from the right. No intracranial stenosis or thrombosis is  identified. There is no evidence of aneurysm.  5. The color CT perfusion imaging shows no abnormal cerebral blood flow  less than 30% by volume. However there is a large area of Tmax greater  than 6 seconds by volume located in the left MCA territory. This has a  volume of 55 mL.                 Radiation dose reduction techniques were utilized, including automated  exposure control and exposure modulation based on body size.      This report was finalized on 3/7/2021 1:16 PM by Dr. Macho Clifford M.D.       CT CEREBRAL PERFUSION W WO CONTRAST W AI ANALYSIS OF LVO [[id number]] Collected: 03/07/21 1126     Updated: 03/07/21 1319    Narrative:      CT ANGIOGRAPHY OF THE HEAD AND NECK WITHOUT AND WITH INTRAVENOUS  CONTRAST AND 3-D RECONSTRUCTIONS AND COLOR CT PERFUSION IMAGING OF THE  BRAIN WITH INTRAVENOUS CONTRAST     HISTORY: Recent onset of severe right-sided weakness. Aphasia. Team D  evaluation.     TECHNIQUE: The CT scan was performed as an emergency procedure through  the head and neck with CT angiography protocol without and with  intravenous contrast and 3-D reconstructions followed by color CT  perfusion imaging of the brain with contrast.      FINDINGS: The following findings are present:  1. An initial noncontrast CT scan of the brain was performed. There is a  large area of old infarction in the right temporoparietal region similar  to the study of 02/24/2016. There is no evidence of intracranial  hemorrhage or mass effect. The findings of the noncontrast CT scan were  communicated to the team D physician when imaging made available at  10:50 AM. The postcontrast findings were communicated when imaging made  available at 11:06 AM.  2. Evaluation for stenosis is based on NASCET criteria. The vertebral  arteries are patent with slight dominance of the right vertebral artery.  The left vertebral artery is quite small in caliber and filling of the  basilar artery is predominantly via the right vertebral artery. The  right posterior communicating artery is patent.  3. The right cervical carotid artery shows no stenosis. The left  cervical carotid artery shows complete occlusion just beyond the origin  of the left internal carotid artery and I suspect this represents acute  occlusion. There is some faint opacification of the internal carotid  artery along the carotid canal at the base of skull.  4. The intracranial CT angiography shows  some decreased opacification of  the left carotid siphon compared to the right. The anterior  communicating artery is patent and the right posterior communicating  artery is patent and therefore much of the left-sided circulation is  supplied from the right. No intracranial stenosis or thrombosis is  identified. There is no evidence of aneurysm.  5. The color CT perfusion imaging shows no abnormal cerebral blood flow  less than 30% by volume. However there is a large area of Tmax greater  than 6 seconds by volume located in the left MCA territory. This has a  volume of 55 mL.                 Radiation dose reduction techniques were utilized, including automated  exposure control and exposure modulation based on body size.     This report was finalized on 3/7/2021 1:16 PM by Dr. Macho Clifford M.D.       XR Chest 1 View [[id number]] Collected: 03/07/21 1200     Updated: 03/07/21 1205    Narrative:      XR CHEST 1 VW-  3/7/2021     HISTORY: Acute stroke protocol.     The heart size is at the upper limits of normal. There are some  bilateral interstitial disease more severe on the right than on the left  with more confluent increased density in the right lung base. There is  masslike area of increased soft tissue along the left paramediastinal  region. This is not seen on the previous study of 1/26/2016 and could  represent atelectasis, dense consolidation or a mass.     Endotracheal tube is seen in good position with its tip overlying the  trachea at the level of the aortic arch.     No pneumothorax is seen.       Impression:      Borderline cardiomegaly.  2. Interstitial disease mostly on the right with more confluent  increased density in the right lung base. FINDINGS could represent  asymmetric pulmonary edema and/or pneumonia.  3. Masslike opacity in the left paramediastinal region could be related  to atelectasis, dense pneumonia and/or mass.  4. Endotracheal tube tip overlies the trachea.  5. Please correlate  with the clinical findings. Short-term follow-up  chest radiograph and/or follow-up CT of the chest recommended.     This report was finalized on 3/7/2021 12:02 PM by Dr. Jay Jay Savage M.D.           --    Discharge Disposition  Home or Self Care    Discharge Medications     Discharge Medications      New Medications      Instructions Start Date   amLODIPine 10 MG tablet  Commonly known as: NORVASC   10 mg, Oral, Every 24 Hours Scheduled      atorvastatin 80 MG tablet  Commonly known as: LIPITOR   80 mg, Oral, Nightly      cloNIDine 0.3 MG tablet  Commonly known as: CATAPRES   0.3 mg, Oral, Every 8 Hours Scheduled      hydrALAZINE 25 MG tablet  Commonly known as: APRESOLINE   25 mg, Oral, Every 8 Hours Scheduled      QUEtiapine 50 MG tablet  Commonly known as: SEROquel   25 mg, Oral, Nightly      ticagrelor 90 MG tablet tablet  Commonly known as: BRILINTA   90 mg, Oral, Daily         Continue These Medications      Instructions Start Date   aspirin 325 MG tablet   325 mg, Oral, Daily      hydrOXYzine 25 MG tablet  Commonly known as: ATARAX   25 mg, Oral, Every 6 Hours PRN      methylPREDNISolone 4 MG dose pack  Commonly known as: MEDROL   Take as directed on package instructions.             Discharge Diet: regular diet    Activity at Discharge:     Follow-up Information     Provider, No Known .    Contact information:  [facility] [id number]                 See primary care provider, Provider, No Known, in 1-2 weeks    Follow-up with neurology per their recommendations.    Test Results Pending at Discharge       Tariq Boone MD  [facility], [facility]  Pulmonary and Critical Care Medicine  03/19/21  07:45 EDT    Time: greater than 30 minutes.        Total time spent discharging patient including evaluation, post hospitalization follow up, medication and post hospitalization instructions and education total time exceeds 30 minutes.

## 2021-03-20 ENCOUNTER — READMISSION MANAGEMENT (OUTPATIENT)
Dept: CALL CENTER | Facility: HOSPITAL | Age: 61
End: 2021-03-20

## 2021-03-20 NOTE — OUTREACH NOTE
Prep Survey      Responses   [facility] facility patient discharged from?  [facility]   Is LACE score < 7 ?  No   Emergency Room discharge w/ pulse ox?  No   Eligibility  Readm Mgmt   Discharge diagnosis  CVA   Does the patient have one of the following disease processes/diagnoses(primary or secondary)?  Stroke (TIA)   Does the patient have Home health ordered?  No   Is there a DME ordered?  No   Comments regarding appointments  call for apmt   Medication alerts for this patient  norvasc, lipitor, catapres, apresoline, seroquel, brilinta   Prep survey completed?  Yes          Cindy Emmanuel RN

## 2021-03-23 ENCOUNTER — READMISSION MANAGEMENT (OUTPATIENT)
Dept: CALL CENTER | Facility: HOSPITAL | Age: 61
End: 2021-03-23

## 2021-03-23 NOTE — OUTREACH NOTE
Stroke Week 1 Survey      Responses   [facility]   Does the patient have one of the following disease processes/diagnoses(primary or secondary)?  Stroke (TIA)   Week 1 attempt successful?  Yes   Call start time  1343   Call end time  1347   Discharge diagnosis  CVA   Is patient permission given to speak with other caregiver?  Yes   Meds reviewed with patient/caregiver?  Yes   Is the patient having any side effects they believe may be caused by any medication additions or changes?  No   Does the patient have all medications ordered at discharge?  Yes   Is the patient taking all medications as directed (includes completed medication regime)?  Yes   Does the patient have a primary care provider?   Yes   Does the patient have an appointment with their PCP within 7 days of discharge?  Yes   Has the patient kept scheduled appointments due by today?  Yes   Has home health visited the patient within 72 hours of discharge?  N/A   Psychosocial issues?  No   Does the patient require any assistance with activities of daily living such as eating, bathing, dressing, walking, etc.?  No   Does the patient have any residual symptoms from stroke/TIA?  Yes   Does the patient understand the diet ordered at discharge?  Yes   Did the patient receive a copy of their discharge instructions?  Yes   Nursing interventions  Reviewed instructions with patient   What is the patient's perception of their health status since discharge?  Improving   Nursing interventions  Nurse provided patient education   Is the patient able to teach back FAST for Stroke?  Yes   Is the patient/caregiver able to teach back the risk factors for a stroke?  High blood pressure-goal below 120/80, Smoking, Diabetes, High Cholesterol, Physical inactivity and obesity   Is the patient/caregiver able to teach back signs and symptoms related to disease process for when to call PCP?  Yes   Is the patient/caregiver able to teach back  signs and symptoms related to disease process for when to call 911?  Yes   Is the patient/caregiver able to teach back the hierarchy of who to call/visit for symptoms/problems? PCP, Specialist, Home health nurse, Urgent Care, ED, 911  Yes   Week 1 call completed?  Yes          Humberto Ryan RN

## 2021-03-29 ENCOUNTER — READMISSION MANAGEMENT (OUTPATIENT)
Dept: CALL CENTER | Facility: HOSPITAL | Age: 61
End: 2021-03-29

## 2021-03-29 NOTE — OUTREACH NOTE
Stroke Week 2 Survey      Responses   [facility] patient discharged from?  [place]   Does the patient have one of the following disease processes/diagnoses(primary or secondary)?  Stroke (TIA)   Week 2 attempt successful?  Yes   Call start time  1657   Call end time  1658   Discharge diagnosis  CVA   Meds reviewed with patient/caregiver?  Yes   Is the patient having any side effects they believe may be caused by any medication additions or changes?  No   Does the patient have all medications ordered at discharge?  Yes   Is the patient taking all medications as directed (includes completed medication regime)?  Yes   Does the patient have a primary care provider?   Yes   Does the patient have an appointment with their PCP within 7 days of discharge?  Yes   Has the patient kept scheduled appointments due by today?  Yes   Has home health visited the patient within 72 hours of discharge?  N/A   Psychosocial issues?  No   Does the patient require any assistance with activities of daily living such as eating, bathing, dressing, walking, etc.?  No   Does the patient have any residual symptoms from stroke/TIA?  Yes   Does the patient understand the diet ordered at discharge?  Yes   Did the patient receive a copy of their discharge instructions?  Yes   Nursing interventions  Reviewed instructions with patient   What is the patient's perception of their health status since discharge?  Improving   Nursing interventions  Nurse provided patient education   Is the patient able to teach back FAST for Stroke?  Yes   Is the patient/caregiver able to teach back the risk factors for a stroke?  High blood pressure-goal below 120/80, Smoking, Diabetes, High Cholesterol, Physical inactivity and obesity   Is the patient/caregiver able to teach back signs and symptoms related to disease process for when to call PCP?  Yes   Is the patient/caregiver able to teach back signs and symptoms related to disease process for when to call  911?  Yes   Is the patient/caregiver able to teach back the hierarchy of who to call/visit for symptoms/problems? PCP, Specialist, Home health nurse, Urgent Care, ED, 911  Yes   Week 2 call completed?  Yes          Humberto Ryan RN

## 2021-04-05 ENCOUNTER — READMISSION MANAGEMENT (OUTPATIENT)
Dept: CALL CENTER | Facility: HOSPITAL | Age: 61
End: 2021-04-05

## 2021-04-05 NOTE — OUTREACH NOTE
Stroke Week 3 Survey      Responses   [facility] patient discharged from?  [place]   Does the patient have one of the following disease processes/diagnoses(primary or secondary)?  Stroke (TIA)   Week 3 attempt successful?  No   Unsuccessful attempts  Attempt 1          Ariella Sandy RN

## 2021-04-09 ENCOUNTER — APPOINTMENT (OUTPATIENT)
Dept: CARDIOLOGY | Facility: HOSPITAL | Age: 61
End: 2021-04-09

## 2021-06-09 ENCOUNTER — TELEPHONE (OUTPATIENT)
Dept: NEUROLOGY | Facility: CLINIC | Age: 61
End: 2021-06-09

## 2021-06-30 ENCOUNTER — CALL CENTER PROGRAMS (OUTPATIENT)
Dept: CALL CENTER | Facility: HOSPITAL | Age: 61
End: 2021-06-30

## 2021-06-30 NOTE — OUTREACH NOTE
Stroke Ishmael Survey      Responses   [facility]   [facility] successful  No   Unsuccessful attempts  Attempt 1          Angely Castelan RN

## 2021-07-01 ENCOUNTER — CALL CENTER PROGRAMS (OUTPATIENT)
Dept: CALL CENTER | Facility: HOSPITAL | Age: 61
End: 2021-07-01

## 2021-07-01 NOTE — OUTREACH NOTE
Stroke Ishmael Survey      Responses   [facility]   Attempt successful  No   Unsuccessful attempts  Attempt 3   Call Center Ishmael Score  7          Angely Castelan RN

## 2021-07-01 NOTE — OUTREACH NOTE
Stroke Ishmael Survey      Responses   [facility]   Attempt successful  No   Unsuccessful attempts  Attempt 2          Angely Castelan RN

## 2021-07-07 ENCOUNTER — OFFICE VISIT (OUTPATIENT)
Dept: NEUROLOGY | Facility: CLINIC | Age: 61
End: 2021-07-07

## 2021-07-07 VITALS
HEIGHT: 73 IN | WEIGHT: 187 LBS | HEART RATE: 68 BPM | OXYGEN SATURATION: 99 % | SYSTOLIC BLOOD PRESSURE: 126 MMHG | BODY MASS INDEX: 24.78 KG/M2 | DIASTOLIC BLOOD PRESSURE: 78 MMHG

## 2021-07-07 DIAGNOSIS — I10 ESSENTIAL HYPERTENSION: ICD-10-CM

## 2021-07-07 DIAGNOSIS — Z86.73 HISTORY OF ISCHEMIC RIGHT MCA STROKE: ICD-10-CM

## 2021-07-07 DIAGNOSIS — E78.5 HYPERLIPIDEMIA LDL GOAL <70: ICD-10-CM

## 2021-07-07 DIAGNOSIS — Z86.73 RECENT CEREBROVASCULAR ACCIDENT (CVA): Primary | ICD-10-CM

## 2021-07-07 PROCEDURE — 99215 OFFICE O/P EST HI 40 MIN: CPT | Performed by: NURSE PRACTITIONER

## 2021-07-07 NOTE — PROGRESS NOTES
"DOS: 2021  NAME: Jose R Dalton   : 1960  PCP: Provider, No Known    Chief Complaint   Patient presents with   • Stroke      SUBJECTIVE  Neurological Problem:  61 y.o. RHW  male with recurrent strokes (LMCA , RMCA 2016), HTN, HLD and reported history of epilepsy presents today for follow-up of stroke.  He is accompanied by his girlfriend.  Patient and problem are new to examiner. History is provided by patient and review of records that are summarized below.     Interval History:   Mr. Dalton presented to [facility] in 2021 with acute onset aphasia, right-sided weakness, had fixed left gaze deviation on arrival, decreased LOC and flaccid right hemiplegia.  He received IV TPA followed with stroke imaging showing large area of penumbra of the left hemisphere, CTA with proximal left carotid occlusion with some flow in the left MCA territory likely from anterior communicating artery.  He underwent emergent thrombectomy, angioplasty and stenting of the left carotid> 90% stenotic, left ICA with acute occlusion.  Subsequent MRI brain showed scattered left MCA territory infarcts.  He was treated with aspirin and Brilinta, high-dose statin.     He presents today along with his girlfriend, continues on Brilinta,  and Lipitor 80 mg.  He states \"I feel great \", apparently lost a significant amount of weight after his hospitalization but is slowly regaining.  Reports being \"80% \"back to his baseline prior to most recent stroke.  He complains of some residual fatigue, slowness, but otherwise he has no focal complaints.  His girlfriend concurs.  He denies any signs or symptoms of bleeding.  He denies any headache, vision changes, unilateral weakness,/numbness/tingling, vertigo.  He is not treated with any AEDs, denies any seizures in \"many years\".   He apparently has not yet followed up with neurosurgery, last carotid ultrasound done on 3/8/2021 shows left carotid stent with mild restenosis 20 to 49%.  There " has been a repeat carotid ultrasound ordered.  [facility].  He continues to smoke about 7 cigarettes per day; Wine occassionally;  no change in ambulation, no falls.    Review of Systems:Review of Systems   Constitutional: Positive for appetite change. Negative for activity change and fatigue.   HENT: Negative for ear pain, tinnitus and trouble swallowing.    Eyes: Negative for photophobia, pain and visual disturbance.   Respiratory: Negative for cough, chest tightness and shortness of breath.    Cardiovascular: Negative for chest pain, palpitations and leg swelling.   Gastrointestinal: Negative for abdominal pain, nausea and vomiting.   Endocrine: Negative for cold intolerance, heat intolerance and polydipsia.   Musculoskeletal: Negative for back pain, gait problem and neck pain.   Skin: Negative for color change, pallor and rash.   Allergic/Immunologic: Negative for environmental allergies, food allergies and immunocompromised state.   Neurological: Negative for dizziness, tremors, seizures, syncope, facial asymmetry, speech difficulty, weakness, light-headedness, numbness and headaches.   Hematological: Negative for adenopathy. Does not bruise/bleed easily.   Psychiatric/Behavioral: Negative for agitation, behavioral problems, confusion, decreased concentration, dysphoric mood, hallucinations, self-injury, sleep disturbance and suicidal ideas. The patient is not nervous/anxious and is not hyperactive.     Above ROS reviewed    The following portions of the patient's history were reviewed and updated as appropriate: allergies, current medications, past family history, past medical history, past social history, past surgical history and problem list.    Current Medications:   Current Outpatient Medications:   •  amLODIPine (NORVASC) 10 MG tablet, Take 1 tablet by mouth Daily., Disp: 30 tablet, Rfl: 0  •  aspirin 325 MG tablet, Take 325 mg by mouth daily., Disp: , Rfl:   •   atorvastatin (LIPITOR) 80 MG tablet, Take 1 tablet by mouth Every Night., Disp: 30 tablet, Rfl: 0  •  cloNIDine (CATAPRES) 0.3 MG tablet, Take 1 tablet by mouth Every 8 (Eight) Hours., Disp: 90 tablet, Rfl: 0  •  hydrALAZINE (APRESOLINE) 25 MG tablet, Take 1 tablet by mouth Every 8 (Eight) Hours., Disp: 90 tablet, Rfl: 0  •  hydrOXYzine (ATARAX) 25 MG tablet, Take 1 tablet by mouth every 6 (six) hours as needed for itching., Disp: 20 tablet, Rfl: 0  •  QUEtiapine (SEROquel) 50 MG tablet, Take 0.5 tablets by mouth Every Night., Disp: 30 tablet, Rfl: 0  •  ticagrelor (BRILINTA) 90 MG tablet tablet, Take 1 tablet by mouth Daily., Disp: 60 tablet, Rfl: 0  **I did not stop or change the above medications.  Patient's medication list was updated to reflect medications they have reported as currently taking, including medication changes made by other providers.    OBJECTIVE  Vitals:    07/07/21 1517   BP: 126/78   Pulse: 68   SpO2: 99%     Body mass index is 24.68 kg/m².    Diagnostics:  MRI brain 3/9/2021: FINDINGS: Areas of acute infarction are identified involving the left  parietal lobe laterally and superior laterally. The more posterior  infarct measures approximately 3 x 4 cm in size. The anterior infarct  measures approximately 3.7 x 1.8 cm in size and involves the  parietotemporal region, as well as extending to and involving the  posterior aspect of the operculum.  Remote infarcts involving the right parietooccipital region are noted  laterally and superolaterally on the right.Hydrocephalus or midline shift.  There is expected flow-void in the basilar artery and in the distal  aspect of the internal carotid arteries bilaterally on the axial T2  sequence.IMPRESSION:  1.  There are acute infarcts involving the left MCA vascular  distribution with infarction involving the left parietal lobe, as well  as the parietotemporal and posterior frontal region. The largest  involves the left parietal lobe where the area of  infarction measures  approximately 3 x 4 cm in transverse dimension.  2.  Remote infarcts involving the parietooccipital region on the right  laterally and superiorly laterally are noted.    Laboratory Results:         Lab Results   Component Value Date    WBC 16.99 (H) 03/14/2021    HGB 10.4 (L) 03/14/2021    HCT 32.7 (L) 03/14/2021    MCV 82.8 03/14/2021     03/14/2021     Lab Results   Component Value Date    GLUCOSE 102 (H) 03/19/2021    BUN 15 03/19/2021    CREATININE 1.11 03/19/2021    EGFRIFAFRI 82 03/19/2021    BCR 13.5 03/19/2021    K 3.2 (L) 03/19/2021    CO2 24.9 03/19/2021    CALCIUM 8.4 (L) 03/19/2021    ALBUMIN 3.70 03/19/2021    AST 94 (H) 03/10/2021    ALT 22 03/10/2021     Lab Results   Component Value Date    HGBA1C 5.90 (H) 03/08/2021     Lab Results   Component Value Date    CHOL 227 (H) 03/08/2021     Lab Results   Component Value Date    HDL 54 03/08/2021    HDL 49 01/24/2016     Lab Results   Component Value Date     (H) 03/08/2021     (H) 01/24/2016     Lab Results   Component Value Date    TRIG 117 03/12/2021    TRIG 143 03/08/2021    TRIG 104 01/24/2016     No results found for: RPR  No results found for: TSH  No results found for: [id number]    Physical Examination:   General Appearance:   Well developed, well nourished, well groomed, alert, and cooperative.  HEENT: Normocephalic.  Edentulous.  Neck and Spine: Normal range of motion.  Normal alignment. No mass or tenderness.   Cardiac: Regular rate and rhythm.   Peripheral Vasculature: Radial pulses are equal and symmetric. No signs of distal embolization.  Extremities:    No edema or deformities. Normal joint ROM.  Skin:    No rashes or birth marks.  Psychiatric:    Good mood, normal affect. Thought process normal.    Neurological examination:  Higher Integrative  Function: Oriented to time, place and person. Normal registration, recall (3/3), attention span and concentration. Normal language including comprehension,  No spontaneous speech, repetition, reading, naming and vocabulary. Mild dysarthria. No neglect.   CN II: Pupils are equal, round, and reactive to light. Normal visual acuity and visual fields.    CN III IV VI: Extraocular movements are full without nystagmus.   CN V: Normal facial sensation and strength of muscles of mastication.  CN VII: Facial movements are symmetric. No weakness.  CN VIII:   Auditory acuity is normal.  CN IX & X:   Symmetric palatal movement.  CN XI: Sternocleidomastoid and trapezius are normal.  No weakness.  CN XII:   The tongue is midline.  No atrophy or fasciculations.  Motor: Normal muscle strength, bulk and tone in upper and lower extremities except for some orbiting about LUE. No fasciculations, rigidity, spasticity, or abnormal movements.  Reflexes: 2+ in the upper and lower extremities.   Sensation: Normal to light touch, vibration, temperature in arms and legs.   Station and Gait: Normal gait and station.    Coordination: Finger to nose test shows no dysmetria.  Heel to shin normal.    Impression:  Mr. Dalton presents today for f/u of recent LMCA stroke s/p IV tPA, mechanical thrombectomy, angioplasty and left carotid stent. He has done well, without significant residuals on exam today. He continues on ASA, Brilinta and Lipitor. He has not yet followed up with [facility], d/w patient the importance of f/u with Dr. Washington and repeat carotid US to assess sent. We reviewed his vascular RFs and importance of control for stroke prevention. We also reviewed the signs/symptoms of stroke and importance of calling 911 if he were to experience any of these. He will f/u here in 9 months.     Plan:     Will contact [facility] to facilitate f/u and repeat carotid US - done  Continue Brilinta and ASA, Lipitor  [facility], goal LDL < 70  Monitor BP regularly  Keep well hydrated  Strongly encouraged smoking cessation  Secondary stroke prevention: Ideal targets for stroke  prevention would be Blood pressure < 130/80; B12 > 500 TSH in normal range and LDL < 70; HbA1c < 6.5 and smoking cessation if applicable.  Call 911 for stroke symptoms  Follow-up in 9 months      I spent a total of 45 minutes today in reviewing records, prior diagnostics, examination of patient as well as counseling and educating patient regarding diagnoses, symptoms, reviewing MRI results with patient and significant other, counseled on importance of f/u diagnostics as well as recommendations, lifestyle modifications and aggressive control of vascular RFs, coordination of care and documenting plan of care.        Diagnoses and all orders for this visit:    1. Recent cerebrovascular accident (CVA) (Primary)    2. History of ischemic right MCA stroke    3. Essential hypertension    4. Hyperlipidemia LDL goal <70        Coding      Dictated using Dragon

## 2021-07-08 PROBLEM — E78.5 HYPERLIPIDEMIA LDL GOAL <70: Status: ACTIVE | Noted: 2021-07-08

## 2021-07-08 PROBLEM — Z86.73 RECENT CEREBROVASCULAR ACCIDENT (CVA): Status: ACTIVE | Noted: 2021-07-08

## 2021-07-08 PROBLEM — Z86.73 HISTORY OF ISCHEMIC RIGHT MCA STROKE: Status: ACTIVE | Noted: 2021-07-08

## 2021-07-08 PROBLEM — Z72.0 TOBACCO ABUSE: Status: ACTIVE | Noted: 2021-07-08

## 2021-07-08 PROBLEM — I10 ESSENTIAL HYPERTENSION: Status: ACTIVE | Noted: 2021-07-08

## 2021-09-15 ENCOUNTER — TELEPHONE (OUTPATIENT)
Dept: NEUROSURGERY | Facility: CLINIC | Age: 61
End: 2021-09-15

## 2021-10-07 ENCOUNTER — TELEPHONE (OUTPATIENT)
Dept: NEUROLOGY | Facility: CLINIC | Age: 61
End: 2021-10-07

## 2021-10-07 NOTE — TELEPHONE ENCOUNTER
Caller: NEELA    Relationship: [facility]    Best call back number: [phone]    What form or medical record are you requesting: OV NOTE FROM 07/07/21    Who is requesting this form or medical record from you: [facility]    How would you like to receive the form or medical records (pick-up, mail, fax): FAX  If fax, what is the fax number: [phone]    Timeframe paperwork needed: ASAP    Additional notes: ASKING FOR LAST OFFICE NOTE TO UPDATE PT'S CHART

## 2021-10-08 ENCOUNTER — HOSPITAL ENCOUNTER (OUTPATIENT)
Dept: CARDIOLOGY | Facility: HOSPITAL | Age: 61
Discharge: HOME | End: 2021-10-08
Admitting: NURSE PRACTITIONER

## 2021-10-08 DIAGNOSIS — I63.232 CEREBROVASCULAR ACCIDENT (CVA) DUE TO OCCLUSION OF LEFT CAROTID ARTERY (HCC): ICD-10-CM

## 2021-10-08 LAB
BH CV LEFT CCA HIDDEN LRR: 1 CM/S
BH CV MID LEFT ICA HIDDEN LRR: 1 CM
BH CV VAS CAROTID LEFT DISTAL STENT EDV: 47 CM/S
BH CV VAS CAROTID LEFT DISTAL STENT: 138 CM/S
BH CV VAS CAROTID LEFT DISTAL TO STENT EDV: 37 CM/S
BH CV VAS CAROTID LEFT DISTAL TO STENT: 107 CM/S
BH CV VAS CAROTID LEFT MID STENT EDV: 60 CM/S
BH CV VAS CAROTID LEFT MID STENT: 200 CM/S
BH CV VAS CAROTID LEFT PROXIMAL STENT EDV: 25 CM/S
BH CV VAS CAROTID LEFT PROXIMAL STENT: 81 CM/S
BH CV VAS CAROTID LEFT PROXIMAL TO STENT: 64 CM/S
BH CV VAS CAROTID LEFT STENT NATIVE VESSEL PROXIMAL ED: 19 CM/S
BH CV XLRA MEAS LEFT CCA RATIO VEL: 73.3 CM/SEC
BH CV XLRA MEAS LEFT DIST CCA EDV: 23.5 CM/SEC
BH CV XLRA MEAS LEFT DIST CCA PSV: 73.3 CM/SEC
BH CV XLRA MEAS LEFT DIST ICA EDV: -36.9 CM/SEC
BH CV XLRA MEAS LEFT DIST ICA PSV: -107 CM/SEC
BH CV XLRA MEAS LEFT ICA RATIO VEL: -199 CM/SEC
BH CV XLRA MEAS LEFT ICA/CCA RATIO: -2.7
BH CV XLRA MEAS LEFT MID ICA EDV: -59.7 CM/SEC
BH CV XLRA MEAS LEFT MID ICA PSV: -199 CM/SEC
BH CV XLRA MEAS LEFT PROX CCA EDV: 15.3 CM/SEC
BH CV XLRA MEAS LEFT PROX CCA PSV: 73.9 CM/SEC
BH CV XLRA MEAS LEFT PROX ECA EDV: -8.3 CM/SEC
BH CV XLRA MEAS LEFT PROX ECA PSV: -61.3 CM/SEC
BH CV XLRA MEAS LEFT PROX ICA EDV: -24.8 CM/SEC
BH CV XLRA MEAS LEFT PROX ICA PSV: -80.9 CM/SEC
BH CV XLRA MEAS LEFT PROX SCLA PSV: 124 CM/SEC
BH CV XLRA MEAS LEFT VERTEBRAL A EDV: -9.4 CM/SEC
BH CV XLRA MEAS LEFT VERTEBRAL A PSV: -58.9 CM/SEC
BH CV XLRA MEAS RIGHT CCA RATIO VEL: -47.9 CM/SEC
BH CV XLRA MEAS RIGHT DIST CCA EDV: -10.2 CM/SEC
BH CV XLRA MEAS RIGHT DIST CCA PSV: -47.9 CM/SEC
BH CV XLRA MEAS RIGHT DIST ICA EDV: -25.9 CM/SEC
BH CV XLRA MEAS RIGHT DIST ICA PSV: -54.6 CM/SEC
BH CV XLRA MEAS RIGHT ICA RATIO VEL: -86.4 CM/SEC
BH CV XLRA MEAS RIGHT ICA/CCA RATIO: 1.8
BH CV XLRA MEAS RIGHT MID ICA EDV: -17.3 CM/SEC
BH CV XLRA MEAS RIGHT MID ICA PSV: -86.4 CM/SEC
BH CV XLRA MEAS RIGHT PROX CCA EDV: 12.2 CM/SEC
BH CV XLRA MEAS RIGHT PROX CCA PSV: 70.3 CM/SEC
BH CV XLRA MEAS RIGHT PROX ECA EDV: -7.9 CM/SEC
BH CV XLRA MEAS RIGHT PROX ECA PSV: -150 CM/SEC
BH CV XLRA MEAS RIGHT PROX ICA EDV: 4.7 CM/SEC
BH CV XLRA MEAS RIGHT PROX ICA PSV: 38.1 CM/SEC
BH CV XLRA MEAS RIGHT PROX SCLA PSV: 196 CM/SEC
BH CV XLRA MEAS RIGHT VERTEBRAL A EDV: -21.2 CM/SEC
BH CV XLRA MEAS RIGHT VERTEBRAL A PSV: -62.5 CM/SEC
BH CVPROX LEFT ICA HIDDEN LRR: 1 CM
LEFT ARM BP: NORMAL MMHG
MAXIMAL PREDICTED HEART RATE: 160 BPM
RIGHT ARM BP: NORMAL MMHG
STRESS TARGET HR: 136 BPM

## 2021-10-08 PROCEDURE — 93880 EXTRACRANIAL BILAT STUDY: CPT

## (undated) DEVICE — DOCK WIRE: Brand: TRAXCESS DOCKING WIRE

## (undated) DEVICE — PATIENT RETURN ELECTRODE, SINGLE-USE, CONTACT QUALITY MONITORING, ADULT, WITH 9FT CORD, FOR PATIENTS WEIGING OVER 33LBS. (15KG): Brand: MEGADYNE

## (undated) DEVICE — ADAPT Y ROT GATEWAY PLS

## (undated) DEVICE — CATH ASPIR REACT .071IN 132CM

## (undated) DEVICE — COPILOT BLEEDBACK CONTROL VALVE: Brand: COPILOT

## (undated) DEVICE — CVR PROB 96IN LF STRL

## (undated) DEVICE — PK ANGIO CERBRL RAD 40

## (undated) DEVICE — ANGIO-SEAL VIP VASCULAR CLOSURE DEVICE: Brand: ANGIO-SEAL

## (undated) DEVICE — RADIFOCUS TORQUE DEVICE MULTI-TORQUE VISE: Brand: RADIFOCUS TORQUE DEVICE

## (undated) DEVICE — INFLATION DEVICE: Brand: ENCORE™ 26

## (undated) DEVICE — STPCK 3WY HP ROT

## (undated) DEVICE — DIL VESL 7F.038 20CM

## (undated) DEVICE — DEL MICROCATH VELOCITY 2.95F 160CM

## (undated) DEVICE — BASN GW RINGMASTER

## (undated) DEVICE — BG WAST DISPOSABLE DEPOT W/TBG48IN S/COCK SPK1400

## (undated) DEVICE — ST ACC MICROPUNCTURE STFF .018 ECHO/PLDM/TP 4F/10CM 21G/7CM

## (undated) DEVICE — PTA BALLOON DILATATION CATHETER: Brand: COYOTE™ ES

## (undated) DEVICE — TBG CONN ASP PENUMBRA SYSTEM MAX .88IN

## (undated) DEVICE — SOLUTION SET, MALE LUER LOCK ADAPTER

## (undated) DEVICE — 0.2 MICRON INTRAVENOUS FILTER FOR 96 HOUR USE WITH MICRO-BORE EXTENSION TUBING AN LUER-LOCK ADAPTER: Brand: PALL POSIDYNE® ELD FILTER

## (undated) DEVICE — RADIFOCUS GLIDEWIRE: Brand: GLIDEWIRE

## (undated) DEVICE — DRSNG SURESITE WNDW 4X4.5

## (undated) DEVICE — AVIATOR PLUS .014 4.0X30 142CM: Brand: AVIATOR

## (undated) DEVICE — APPL CHLORAPREP HI/LITE 26ML ORNG

## (undated) DEVICE — GUIDEWIRES: Brand: TRAXCESS GUIDEWIRE

## (undated) DEVICE — PINNACLE INTRODUCER SHEATH: Brand: PINNACLE

## (undated) DEVICE — EMBOLIC PROTECTION SYSTEM: Brand: FILTERWIRE EZ™

## (undated) DEVICE — Device

## (undated) DEVICE — GLV SURG BIOGEL LTX PF 8

## (undated) DEVICE — TBG ART PRESS 24 IN

## (undated) DEVICE — SPNG GZ WOVN 4X4IN 12PLY 10/BX STRL

## (undated) DEVICE — CANSTR SXN PENUMBRA ENGINE

## (undated) DEVICE — PINNACLE R/O II INTRODUCER SHEATH WITH RADIOPAQUE MARKER: Brand: PINNACLE

## (undated) DEVICE — WIPE INST MEROCEL

## (undated) DEVICE — CATH ANGIO BCN .038 5F 125CM VTK